# Patient Record
Sex: MALE | Race: WHITE
[De-identification: names, ages, dates, MRNs, and addresses within clinical notes are randomized per-mention and may not be internally consistent; named-entity substitution may affect disease eponyms.]

---

## 2018-04-20 ENCOUNTER — HOSPITAL ENCOUNTER (INPATIENT)
Dept: HOSPITAL 47 - EC | Age: 78
LOS: 2 days | Discharge: HOME | DRG: 65 | End: 2018-04-22
Payer: MEDICARE

## 2018-04-20 DIAGNOSIS — Z86.73: ICD-10-CM

## 2018-04-20 DIAGNOSIS — I08.3: ICD-10-CM

## 2018-04-20 DIAGNOSIS — I10: ICD-10-CM

## 2018-04-20 DIAGNOSIS — R40.2362: ICD-10-CM

## 2018-04-20 DIAGNOSIS — R40.2252: ICD-10-CM

## 2018-04-20 DIAGNOSIS — I63.9: Primary | ICD-10-CM

## 2018-04-20 DIAGNOSIS — G37.9: ICD-10-CM

## 2018-04-20 DIAGNOSIS — G81.91: ICD-10-CM

## 2018-04-20 DIAGNOSIS — H54.61: ICD-10-CM

## 2018-04-20 DIAGNOSIS — R40.2142: ICD-10-CM

## 2018-04-20 DIAGNOSIS — R29.702: ICD-10-CM

## 2018-04-20 DIAGNOSIS — R47.81: ICD-10-CM

## 2018-04-20 DIAGNOSIS — Z87.891: ICD-10-CM

## 2018-04-20 LAB
ALBUMIN SERPL-MCNC: 3.7 G/DL (ref 3.5–5)
ALP SERPL-CCNC: 89 U/L (ref 38–126)
ALT SERPL-CCNC: 27 U/L (ref 21–72)
ANION GAP SERPL CALC-SCNC: 13 MMOL/L
APTT BLD: 22.4 SEC (ref 22–30)
AST SERPL-CCNC: 25 U/L (ref 17–59)
BASOPHILS # BLD AUTO: 0 K/UL (ref 0–0.2)
BASOPHILS NFR BLD AUTO: 1 %
BUN SERPL-SCNC: 15 MG/DL (ref 9–20)
CALCIUM SPEC-MCNC: 9.2 MG/DL (ref 8.4–10.2)
CHLORIDE SERPL-SCNC: 103 MMOL/L (ref 98–107)
CK SERPL-CCNC: 182 U/L (ref 55–170)
CO2 SERPL-SCNC: 24 MMOL/L (ref 22–30)
EOSINOPHIL # BLD AUTO: 0.1 K/UL (ref 0–0.7)
EOSINOPHIL NFR BLD AUTO: 2 %
ERYTHROCYTE [DISTWIDTH] IN BLOOD BY AUTOMATED COUNT: 4.52 M/UL (ref 4.3–5.9)
ERYTHROCYTE [DISTWIDTH] IN BLOOD: 13 % (ref 11.5–15.5)
GLUCOSE BLD-MCNC: 120 MG/DL (ref 75–99)
GLUCOSE SERPL-MCNC: 123 MG/DL (ref 74–99)
HCT VFR BLD AUTO: 40.6 % (ref 39–53)
HGB BLD-MCNC: 13.8 GM/DL (ref 13–17.5)
INR PPP: 1.1 (ref ?–1.2)
LYMPHOCYTES # SPEC AUTO: 0.9 K/UL (ref 1–4.8)
LYMPHOCYTES NFR SPEC AUTO: 13 %
MCH RBC QN AUTO: 30.6 PG (ref 25–35)
MCHC RBC AUTO-ENTMCNC: 34.1 G/DL (ref 31–37)
MCV RBC AUTO: 89.9 FL (ref 80–100)
MONOCYTES # BLD AUTO: 0.5 K/UL (ref 0–1)
MONOCYTES NFR BLD AUTO: 7 %
NEUTROPHILS # BLD AUTO: 5.3 K/UL (ref 1.3–7.7)
NEUTROPHILS NFR BLD AUTO: 76 %
PLATELET # BLD AUTO: 193 K/UL (ref 150–450)
POTASSIUM SERPL-SCNC: 4 MMOL/L (ref 3.5–5.1)
PROT SERPL-MCNC: 7.1 G/DL (ref 6.3–8.2)
PT BLD: 10.3 SEC (ref 9–12)
SODIUM SERPL-SCNC: 140 MMOL/L (ref 137–145)
TROPONIN I SERPL-MCNC: <0.012 NG/ML (ref 0–0.03)
WBC # BLD AUTO: 6.9 K/UL (ref 3.8–10.6)

## 2018-04-20 PROCEDURE — 93005 ELECTROCARDIOGRAM TRACING: CPT

## 2018-04-20 PROCEDURE — 80061 LIPID PANEL: CPT

## 2018-04-20 PROCEDURE — 83036 HEMOGLOBIN GLYCOSYLATED A1C: CPT

## 2018-04-20 PROCEDURE — 99285 EMERGENCY DEPT VISIT HI MDM: CPT

## 2018-04-20 PROCEDURE — 82553 CREATINE MB FRACTION: CPT

## 2018-04-20 PROCEDURE — 83090 ASSAY OF HOMOCYSTEINE: CPT

## 2018-04-20 PROCEDURE — 36415 COLL VENOUS BLD VENIPUNCTURE: CPT

## 2018-04-20 PROCEDURE — 83735 ASSAY OF MAGNESIUM: CPT

## 2018-04-20 PROCEDURE — 80053 COMPREHEN METABOLIC PANEL: CPT

## 2018-04-20 PROCEDURE — 70450 CT HEAD/BRAIN W/O DYE: CPT

## 2018-04-20 PROCEDURE — 70496 CT ANGIOGRAPHY HEAD: CPT

## 2018-04-20 PROCEDURE — 84484 ASSAY OF TROPONIN QUANT: CPT

## 2018-04-20 PROCEDURE — 82550 ASSAY OF CK (CPK): CPT

## 2018-04-20 PROCEDURE — 85610 PROTHROMBIN TIME: CPT

## 2018-04-20 PROCEDURE — 95816 EEG AWAKE AND DROWSY: CPT

## 2018-04-20 PROCEDURE — 70498 CT ANGIOGRAPHY NECK: CPT

## 2018-04-20 PROCEDURE — 85027 COMPLETE CBC AUTOMATED: CPT

## 2018-04-20 PROCEDURE — 85025 COMPLETE CBC W/AUTO DIFF WBC: CPT

## 2018-04-20 PROCEDURE — 93306 TTE W/DOPPLER COMPLETE: CPT

## 2018-04-20 PROCEDURE — 85730 THROMBOPLASTIN TIME PARTIAL: CPT

## 2018-04-20 PROCEDURE — 71046 X-RAY EXAM CHEST 2 VIEWS: CPT

## 2018-04-20 PROCEDURE — 70553 MRI BRAIN STEM W/O & W/DYE: CPT
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
## 2018-04-20 NOTE — XR
EXAMINATION TYPE: XR chest 2V

 

DATE OF EXAM: 4/20/2018

 

COMPARISON: NONE

 

HISTORY: Altered mental status, right-sided weakness

 

TECHNIQUE:  Frontal and lateral views of the chest are obtained.

 

FINDINGS:  There is  pleural effusion or pneumothorax seen. Question some increased density superimpo
sed over the left heart, patient is rotated.  The cardiac silhouette size is enlarged, appearance of 
size may be accentuated by rotation.  Increased AP diameter of the chest could be indicative of under
lying COPD. Anterior flowing osteophytes within the spine suggest diffuse idiopathic skeletal hyperos
tosis. The aorta is dense. The osseous structures are intact.

 

IMPRESSION:  Cardiomegaly is suspected. Possible left lower lobe pneumonia.

## 2018-04-20 NOTE — CT
EXAMINATION TYPE: CT angio head neck

 

DATE OF EXAM: 4/20/2018

 

COMPARISON: NONE

 

HISTORY: Rt sided weakness

 

CT DLP: 382.8 mGycm

 

CONTRAST: 

Performed with IV Contrast, patient injected with 65 mL of Isovue 370.

 

Combination Contrast CTA cervical carotids and [place] CTA cervical carotids. Poor contrast 
bolus limits evaluation. 3-D imaging not available.

 

Contrast CTA of the cervical carotids was performed 3-D reconstruction imaging obtained at a separate
 workstation.  

 

Right carotid system: Mild plaque is seen of the right common carotid artery.  There is mild to moder
ate plaque also noted at the carotid bulb and proximal ICA.  Estimated diameter reduction is approxim
ately 50%.  ECA is patent.  Right vertebral artery appears unremarkable.  

 

Left carotid system: Mild plaque is seen of the left common carotid artery.  There is mild to moderat
e plaque also noted at the carotid bulb and proximal ICA.  Estimated diameter reduction is approximat
ely 50%.  ECA is patent. Left vertebral artery appears unremarkable. 

 

IMPRESSION: 

 

1. 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
lly. Examination is limited as noted above. 

 

CTA [place] 

 

Contrast CTA of the [place] was performed. 3-D imaging could not be performed given poor con
trast bolus.

 

Vertebrobasilar system as well as intracranial portions of the internal carotid arteries and their ma
[place] tributaries are patent.  I do not see evidence for sizable aneurysm or vascular malformation.  Pl
ease note MRI provides greater sensitivity and specificity.  Visualized brain appears grossly unremar
kable. 

 

IMPRESSION:

 

1. Limited study. No obvious abnormality seen.
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
## 2018-04-20 NOTE — ED
General Adult HPI





- General


Stated complaint: Poss Stroke


Time Seen by Provider: 04/20/18 11:12


Source: RN notes reviewed





- History of Present Illness


Initial comments: 





This is a 77-year-old male who presents emergency Department complaining that 

about a half an hour ago he started having some right-sided weakness and 

slurred speech however at this time he feels as though his speech is much more 

clear and his weakness is almost completely resolved.  Patient denies any 

visual disturbance.  Patient denies any headache she denies any focal numbness.

  Patient denies any recent fever or chills.  Patient denies abdominal pain 

patient denies nausea vomiting diarrhea.  Patient states once many years ago he 

was told he had a stroke.  He has no residual deficit.





- Related Data


 Home Medications











 Medication  Instructions  Recorded  Confirmed


 


No Known Home Medications [No  04/20/18 04/20/18





Known Home Medications]   











 Allergies











Allergy/AdvReac Type Severity Reaction Status Date / Time


 


No Known Allergies Allergy   Verified 04/20/18 11:58














Review of Systems


ROS Statement: 


Those systems with pertinent positive or pertinent negative responses have been 

documented in the HPI.





ROS Other: All systems not noted in ROS Statement are negative.





General Exam





- General Exam Comments


Initial Comments: 





GENERAL:


Patient is well-developed and well-nourished.  Patient is nontoxic and well-

hydrated and is in no acute distress.





ENT:


Neck is soft and supple.  No significant lymphadenopathy is noted.  Oropharynx 

is clear.  Moist mucous membranes.  Neck has full range of motion without 

eliciting any pain.  





EYES:


The sclera were anicteric and conjunctiva were pink and moist.  Extraocular 

movements were intact and pupils were equal round and reactive to light.  

Eyelids were unremarkable.





PULMONARY:


Unlabored respirations.  Good breath sounds bilaterally.  No audible rales 

rhonchi or wheezing was noted.





CARDIOVASCULAR:


There is a regular rate and rhythm without any murmurs gallops or rubs. 





ABDOMEN:


Soft and nontender with normal bowel sounds.  No palpable organomegaly was 

noted.  There is no palpable pulsatile mass.





SKIN:


Skin is clear with no lesions or rashes and otherwise unremarkable.





NEUROLOGIC:


Patient is alert and oriented x3.  Cranial nerves II through XII are grossly 

intact.  Patient's right  was slightly less than the left.  His finger to 

nose testing was intact bilaterally.  Patient had no drift.  Patient's speech 

initially had a slight slur but after I was in the room for 5 minutes she no 

longer had any slurred speech





MUSCULOSKELETAL:


Normal extremities with adequate strength and full range of motion.  No lower 

extremity swelling or edema.  No calf tenderness.





LYMPHATICS:


No significant lymphadenopathy is noted





PSYCHIATRIC:


Normal psychiatric evaluation.  





Course


 Vital Signs











  04/20/18 04/20/18 04/20/18





  11:17 11:30 11:45


 


Temperature 98 F  


 


Pulse Rate 100 93 94


 


Respiratory 16 16 16





Rate   


 


Blood Pressure 194/123 182/108 184/110


 


O2 Sat by Pulse 96 96 93 L





Oximetry   














  04/20/18 04/20/18 04/20/18





  12:00 12:15 12:30


 


Temperature   


 


Pulse Rate 90 87 111 H


 


Respiratory 16 16 16





Rate   


 


Blood Pressure 171/101 125/87 186/116


 


O2 Sat by Pulse 92 L 95 96





Oximetry   














  04/20/18 04/20/18





  13:00 14:00


 


Temperature  


 


Pulse Rate 85 74


 


Respiratory 16 16





Rate  


 


Blood Pressure 172/114 176/114


 


O2 Sat by Pulse 97 97





Oximetry  














Medical Decision Making





- Medical Decision Making





EKG shows sinus rhythm with occasional PAC at 90 bpm OH interval is 192 QRS is 

90 QT interval 360 QTC is 459.  Patient's EKG shows no ST segment elevation or 

depression or T wave abnormalities are noted.





CT of the brain shows no acute abnormality.  The neuro interventional is was 

involved in the case and did not want TPA.  Patient's symptoms completely 

resolved.





I spoke with Dr. Dr. Hector he agreed to admit the patient admitted the patient 

I wrote admitting orders.





- Lab Data


Result diagrams: 


 04/20/18 11:46





 04/20/18 11:46


 Lab Results











  04/20/18 04/20/18 04/20/18 Range/Units





  11:46 11:46 11:46 


 


WBC   6.9   (3.8-10.6)  k/uL


 


RBC   4.52   (4.30-5.90)  m/uL


 


Hgb   13.8   (13.0-17.5)  gm/dL


 


Hct   40.6   (39.0-53.0)  %


 


MCV   89.9   (80.0-100.0)  fL


 


MCH   30.6   (25.0-35.0)  pg


 


MCHC   34.1   (31.0-37.0)  g/dL


 


RDW   13.0   (11.5-15.5)  %


 


Plt Count   193   (150-450)  k/uL


 


Neutrophils %   76   %


 


Lymphocytes %   13   %


 


Monocytes %   7   %


 


Eosinophils %   2   %


 


Basophils %   1   %


 


Neutrophils #   5.3   (1.3-7.7)  k/uL


 


Lymphocytes #   0.9 L   (1.0-4.8)  k/uL


 


Monocytes #   0.5   (0-1.0)  k/uL


 


Eosinophils #   0.1   (0-0.7)  k/uL


 


Basophils #   0.0   (0-0.2)  k/uL


 


PT     (9.0-12.0)  sec


 


INR     (<1.2)  


 


APTT     (22.0-30.0)  sec


 


Sodium    140  (137-145)  mmol/L


 


Potassium    4.0  (3.5-5.1)  mmol/L


 


Chloride    103  ()  mmol/L


 


Carbon Dioxide    24  (22-30)  mmol/L


 


Anion Gap    13  mmol/L


 


BUN    15  (9-20)  mg/dL


 


Creatinine    0.83  (0.66-1.25)  mg/dL


 


Est GFR (CKD-EPI)AfAm    >90  (>60 ml/min/1.73 sqM)  


 


Est GFR (CKD-EPI)NonAf    85  (>60 ml/min/1.73 sqM)  


 


Glucose    123 H  (74-99)  mg/dL


 


POC Glucose (mg/dL)     (75-99)  mg/dL


 


POC Glu Operater ID     


 


Calcium    9.2  (8.4-10.2)  mg/dL


 


Total Bilirubin    0.5  (0.2-1.3)  mg/dL


 


AST    25  (17-59)  U/L


 


ALT    27  (21-72)  U/L


 


Alkaline Phosphatase    89  ()  U/L


 


Total Creatine Kinase  182 H    ()  U/L


 


CK-MB (CK-2)  2.0    (0.0-2.4)  ng/mL


 


CK-MB (CK-2) Rel Index  1.1    


 


Troponin I  <0.012    (0.000-0.034)  ng/mL


 


Total Protein    7.1  (6.3-8.2)  g/dL


 


Albumin    3.7  (3.5-5.0)  g/dL














  04/20/18 04/20/18 Range/Units





  11:46 11:48 


 


WBC    (3.8-10.6)  k/uL


 


RBC    (4.30-5.90)  m/uL


 


Hgb    (13.0-17.5)  gm/dL


 


Hct    (39.0-53.0)  %


 


MCV    (80.0-100.0)  fL


 


MCH    (25.0-35.0)  pg


 


MCHC    (31.0-37.0)  g/dL


 


RDW    (11.5-15.5)  %


 


Plt Count    (150-450)  k/uL


 


Neutrophils %    %


 


Lymphocytes %    %


 


Monocytes %    %


 


Eosinophils %    %


 


Basophils %    %


 


Neutrophils #    (1.3-7.7)  k/uL


 


Lymphocytes #    (1.0-4.8)  k/uL


 


Monocytes #    (0-1.0)  k/uL


 


Eosinophils #    (0-0.7)  k/uL


 


Basophils #    (0-0.2)  k/uL


 


PT  10.3   (9.0-12.0)  sec


 


INR  1.1   (<1.2)  


 


APTT  22.4   (22.0-30.0)  sec


 


Sodium    (137-145)  mmol/L


 


Potassium    (3.5-5.1)  mmol/L


 


Chloride    ()  mmol/L


 


Carbon Dioxide    (22-30)  mmol/L


 


Anion Gap    mmol/L


 


BUN    (9-20)  mg/dL


 


Creatinine    (0.66-1.25)  mg/dL


 


Est GFR (CKD-EPI)AfAm    (>60 ml/min/1.73 sqM)  


 


Est GFR (CKD-EPI)NonAf    (>60 ml/min/1.73 sqM)  


 


Glucose    (74-99)  mg/dL


 


POC Glucose (mg/dL)   120 H  (75-99)  mg/dL


 


POC Glu Operater ID   Noreen Sousa  


 


Calcium    (8.4-10.2)  mg/dL


 


Total Bilirubin    (0.2-1.3)  mg/dL


 


AST    (17-59)  U/L


 


ALT    (21-72)  U/L


 


Alkaline Phosphatase    ()  U/L


 


Total Creatine Kinase    ()  U/L


 


CK-MB (CK-2)    (0.0-2.4)  ng/mL


 


CK-MB (CK-2) Rel Index    


 


Troponin I    (0.000-0.034)  ng/mL


 


Total Protein    (6.3-8.2)  g/dL


 


Albumin    (3.5-5.0)  g/dL














Disposition


Clinical Impression: 


 Transient cerebral ischemia





Disposition: ADMITTED AS IP TO THIS HOSP


Is patient prescribed a controlled substance at d/c from ED?: No


Time of Disposition: 14:00

## 2018-04-20 NOTE — CT
EXAMINATION TYPE: CT brain wo con for TPA

 

DATE OF EXAM: 4/20/2018

 

COMPARISON: NONE

 

HISTORY: Rt sided weakness

 

CT DLP: 961.8 mGycm

 

Unenhanced CT of the brain was performed.  

 

The ventricles, basal cisterns and sulci overlying the cerebral convexities demonstrate mild enlargem
ent. 

 

There is no evidence for intracranial hemorrhage or sulcal effacement.  

 

There is moderate confluent decreased attenuation about the periventricular white matter and deep whi
te matter of both cerebral hemispheres, compatible with chronic small vessel ischemia. Differential d
iagnosis does include demyelination. 

 

No mass effects are seen.No midline shift.

 

Osseous calvarium is intact.  Scleral buckle procedure right lobe.

 

If symptoms persist consider MRI.  

 

IMPRESSION:

 

1. Age related atrophic and chronic small vessel ischemic change without acute intracranial process s
een at this time.

## 2018-04-21 LAB
ALBUMIN SERPL-MCNC: 3.6 G/DL (ref 3.5–5)
ALP SERPL-CCNC: 76 U/L (ref 38–126)
ALT SERPL-CCNC: 22 U/L (ref 21–72)
ANION GAP SERPL CALC-SCNC: 11 MMOL/L
AST SERPL-CCNC: 28 U/L (ref 17–59)
BUN SERPL-SCNC: 14 MG/DL (ref 9–20)
CALCIUM SPEC-MCNC: 8.9 MG/DL (ref 8.4–10.2)
CHLORIDE SERPL-SCNC: 104 MMOL/L (ref 98–107)
CHOLEST SERPL-MCNC: 154 MG/DL (ref ?–200)
CO2 SERPL-SCNC: 28 MMOL/L (ref 22–30)
ERYTHROCYTE [DISTWIDTH] IN BLOOD BY AUTOMATED COUNT: 4.6 M/UL (ref 4.3–5.9)
ERYTHROCYTE [DISTWIDTH] IN BLOOD: 13.1 % (ref 11.5–15.5)
GLUCOSE SERPL-MCNC: 78 MG/DL (ref 74–99)
HBA1C MFR BLD: 5.2 % (ref 4–6)
HCT VFR BLD AUTO: 41.7 % (ref 39–53)
HDLC SERPL-MCNC: 30 MG/DL (ref 40–60)
HGB BLD-MCNC: 13.8 GM/DL (ref 13–17.5)
LDLC SERPL CALC-MCNC: 100 MG/DL (ref 0–99)
MAGNESIUM SPEC-SCNC: 2 MG/DL (ref 1.6–2.3)
MCH RBC QN AUTO: 29.9 PG (ref 25–35)
MCHC RBC AUTO-ENTMCNC: 33 G/DL (ref 31–37)
MCV RBC AUTO: 90.6 FL (ref 80–100)
PLATELET # BLD AUTO: 199 K/UL (ref 150–450)
POTASSIUM SERPL-SCNC: 4 MMOL/L (ref 3.5–5.1)
PROT SERPL-MCNC: 7 G/DL (ref 6.3–8.2)
SODIUM SERPL-SCNC: 143 MMOL/L (ref 137–145)
TRIGL SERPL-MCNC: 120 MG/DL (ref ?–150)
WBC # BLD AUTO: 9.4 K/UL (ref 3.8–10.6)

## 2018-04-21 RX ADMIN — ASPIRIN 81 MG CHEWABLE TABLET SCH MG: 81 TABLET CHEWABLE at 08:25

## 2018-04-21 NOTE — ECHOF
Referral Reason:stroke



MEASUREMENTS

--------

HEIGHT: 157.5 cm

WEIGHT: 114.3 kg

BP: 120/50

IVSd:   1.4 cm     (0.6 - 1.1)

LVIDd:   4.6 cm     (3.9 - 5.3)

LVPWd:   1.3 cm     (0.6 - 1.1)

IVSs:   2.3 cm

LVIDs:   2.9 cm

LVPWs:   1.6 cm

LAESV Index (A-L):   44.82 ml/m

Ao Diam:   3.9 cm     (2.0 - 3.7)

AV Cusp:   1.9 cm     (1.5 - 2.6)

LA Diam:   5.4 cm     (2.7 - 3.8)

MV EXCURSION:   15.662 mm     (> 18.000)

MV EF SLOPE:   64 mm/s     (70 - 150)

EPSS:   1.9 cm

MV E Vinnie:   0.50 m/s

MV DecT:   333 ms

MV A Vinnie:   0.79 m/s

MV E/A Ratio:   0.63 

AR PHT:   629 ms

RAP:   5.00 mmHg

RVSP:   34.14 mmHg







FINDINGS

--------

Sinus rhythm.

This was a technically adequate study.

The left ventricular size is normal.   There is moderate concentric left ventricular hypertrophy.   O
verall left ventricular systolic function is normal with, an EF between 55 - 60 %.

The right ventricle is normal in size.

LA is severely dilated >40 ml/m2

The right atrial size is normal.

There is mild aortic valve sclerosis.   There is mild aortic regurgitation.

Mild mitral annular calcification present.   Mild mitral regurgitation is present.

Mild tricuspid regurgitation present.   There is no evidence of pulmonary hypertension.   The right v
entricular systolic pressure, as measured by Doppler, is 34.14mmHg.

Trace/mild (physiologic)  pulmonic regurgitation.

The aortic root size is normal.

There is no pericardial effusion.



CONCLUSIONS

--------

1. Sinus rhythm.

2. The left ventricular size is normal.

3. There is moderate concentric left ventricular hypertrophy.

4. Overall left ventricular systolic function is normal with, an EF between 55 - 60 %.

5. LA is severely dilated >40 ml/m2

6. There is mild aortic valve sclerosis.

7. There is mild aortic regurgitation.

8. Mild mitral annular calcification present.

9. Mild mitral regurgitation is present.

10. Mild tricuspid regurgitation present.

11. There is no evidence of pulmonary hypertension.

12. Trace/mild (physiologic)  pulmonic regurgitation.

13. The aortic root size is normal.

14. There is no pericardial effusion.





SONOGRAPHER: Missy Edmonds RDCS

## 2018-04-21 NOTE — P.PN
Subjective


Progress Note Date: 04/21/18


Principal diagnosis: 


CVA








Neurology is following on a 77-year-old male who presented to the ED with right-

sided weakness, slurred speech.  Speech resolved with the exception of the 

patient's right perioral droop.  Patient complained of right upper and lower 

extremity weakness which originally resolved prior to neurology consult 

yesterday.  However, symptoms returned and were noted on physical exam during 

neurology consult.  Patient states that the symptoms are currently unchanged 

with the exception that his speech has returned to baseline.





MR brain noted to tiny areas of recent ischemic changes in the kwame.  White 

matter lesions within the corpus callosum and ventricles are suspicious for 

multiple sclerosis.  Other causes of demyelination such as ischemia are not 

entirely excluded.  Patient had previous CT angiogram head and neck which noted 

no significant focal stenosis in common or internal carotid arteries 

bilaterally.  No aneurysmal change or significant focal stenosis at the level 

of [place].  Given the patient's MRI findings, CT angiogram was 

repeated for verification of any new changes.  No new changes were noted in 

comparative study.





Patient is ambulating in the room without assistance or difficulty.  Patient is 

alert and oriented 3 and in no acute distress.








Objective





- Vital Signs


Vital signs: 


 Vital Signs











Temp  96.7 F L  04/21/18 15:48


 


Pulse  65   04/21/18 15:48


 


Resp  18   04/21/18 15:48


 


BP  176/94   04/21/18 15:48


 


Pulse Ox  97   04/21/18 15:48








 Intake & Output











 04/21/18 04/21/18 04/22/18





 06:59 18:59 06:59


 


Intake Total  120 


 


Output Total 1400 300 


 


Balance -1400 -180 


 


Weight 114.7 kg  


 


Intake:   


 


  Oral  120 


 


Output:   


 


  Urine 1400 300 


 


Other:   


 


  Voiding Method Urinal Urinal 


 


  # Voids  1 1














- Exam





General appearance: Alert & oriented x3, no apparent distress.


Head: Atraumatic, normocephalic, normal inspection


Eyes: Well appearance, PERRLA, EOMI.  .


Ear, nose and throat: Normal exam, mucous membranes moist


Neck: Normal inspection, absent tenderness, lymphadenopathy.


Respiratory: No increased work of breathing


Cardiovascular: Regular rate, rhythm


GI/abdominal: nontender, nondistended


Extremities: full range of motion, normal capillary refill, no tenderness, 

pedal edema joint swelling, calf tenderness.





Neurological: 


cranial nerves II through XII intact, noted upper lip perioral droop - right 

side


right-sided lateralizing weakness


no seizure activity noted on physical exam


no pronator drift and no nystagmus.


Left lower extremity: 4+/5


Right lower extremity: 4 minus/5


Left upper extremity: 4+/5


Right upper extremity: 4 minus/5


Sensation: decreased but intact in the right upper and lower extremity, normal 

in left upper and lower extremity





Psychological: Mood and affect appropriate for setting.





- Labs


CBC & Chem 7: 


 04/21/18 06:50





 04/21/18 06:23


Labs: 


 Abnormal Lab Results - Last 24 Hours (Table)











  04/21/18 Range/Units





  06:23 


 


LDL Cholesterol, Calc  100 H  (0-99)  mg/dL


 


HDL Cholesterol  30 L  (40-60)  mg/dL














Assessment and Plan


(1) CVA (cerebral vascular accident)


Current Visit: Yes   Status: Acute   Code(s): I63.9 - CEREBRAL INFARCTION, 

UNSPECIFIED   SNOMED Code(s): 883515959


   





(2) Right sided weakness


Current Visit: Yes   Status: Acute   Code(s): R53.1 - WEAKNESS   SNOMED Code(s)

: 917795462


   





(3) Mouth droop


Current Visit: Yes   Status: Acute   Code(s): R29.810 - FACIAL WEAKNESS   

SNOMED Code(s): 816352155


   


Plan: 





1.  CVA


2.  Right-sided weakness


3.  right perioral droop





Patient does appear to have suffered CVA with 2 noted areas of ischemic changes 

in the kwame.  Patient's symptoms involving speech have resolved with the patient

's upper and lower extremity weakness on the right are still noted on physical 

exam.  Patient also still has visible right perioral droop.  Patient does not 

have fasciculations.  On physical exam, patient is absent Babinski on the left 

and mildly positive on the right.  MRI of the brain did note to new areas of 

changes to the kwame with other conservative possible demyelinating etiology as 

well.  EEG was normal.  Serum homocystine is still pending. Fasting lipid panel 

have also been ordered.  





patient will continue 81 mg aspirin daily


Prescribed: Lipitor 40 mg by mouth daily at bedtime.  Allow the patient's lipid 

panel is only mildly elevated with an LDL.  Patient does have one previous 

stroke that was found to [place] on imaging several years ago.  Patient's 

current event is a second event and requires maximum statin therapy regardless 

of lipid panel results.





continue physical therapy/occupational therapy as recommended for right upper 

and lower extremity weakness





Discussed medication compliance and adherence with the patient given the fact 

that the patient is now multivitamin history with increasing deficits with each 

occurrence.





status: Neurology will clear the patient for discharge from a neurological 

standpoint.  Patient to follow up in our office within 10-14 days.





I have discussed the plan of care with the physician prior to implementation 

and he agrees with the plan as implemented.

## 2018-04-21 NOTE — CT
EXAMINATION TYPE: CT angio head neck

 

DATE OF EXAM: 4/21/2018

 

HISTORY: Hx of stroke. Admitted for neuro deficits or acute onset right-sided weakness one day jennifer
rHank

 

COMPARISON: CTA from one day earlier.

 

CT DLP: 539.5 mGycm.  Automated Exposure Control for Dose Reduction was Utilized.

 

TECHNIQUE:  CTA scan of the neck is performed with IV Contrast, patient injected with 65 mL of Isovue
 370, axial images are obtained, coronal and sagittal reformatted images are reviewed. Three-D recons
tructed images are created on an independent workstation and reviewed.

 

FINDINGS:

 

Carotid/Vascular Structures: There is mild peripheral plaque in aortic arch. There is normal three-ve
ssel origin from aortic arch. There is mild peripheral calcified plaque at origin of right common car
otid artery. Remainder right common carotid artery shows slight tortuous course without significant p
laque or stenosis. There is more moderate peripheral calcified plaque at carotid bulb with mild to mo
derate mixed plaque extending into internal carotid artery. There is marked tortuous course to the mi
d segment of right internal carotid artery. No significant stenosis is present. There is mild to mode
rate calcified plaque supraclinoid segment. Right external carotid artery shows no significant plaque
 or stenosis. 

 

Bilateral subclavian arteries show no significant plaque or stenosis. The left common carotid artery 
shows no significant plaque or stenosis. There is moderate calcified plaque at left carotid bulb with
 mild to moderate mixed plaque extending into left internal carotid artery. No significant stenosis i
s present. There is marked tortuous course to the mid segment of the left internal carotid artery. Th
ere is mild calcified plaque supraclinoid segment without significant stenosis. There is patent left 
external carotid artery without significant plaque or stenosis. 

 

There is dominant left vertebral artery. Vertebral arteries are patent to basilar junction. There is 
patent left posterior communicating artery. There is no significant stenosis or aneurysmal change in 
the posterior circulation. There is hypoplastic right posterior communicating artery noted.

 

Images of the anterior circulation show patent anterior communicating artery. There is no significant
 plaque or stenosis. There is slightly more inferior and anterior course to the right middle cerebral
 artery noted than typically seen.

 

Other: Visualized portion of brain parenchyma shows age-related atrophy and chronic small vessel isch
emic change. There is scleral buckle right globe with lens metallic density presumed postsurgical (ve
rsus foreign body).

 

There is prominent multilevel spurring in the cervical thoracic spine. Correlate for DISH. There is e
xaggerated cervical curvature. There is multilevel uncovertebral facet arthropathy.

 

Visualized lung apices show moderate emphysematous change with scattered subpleural scarring bilatera
lly noted.

 

Mild to moderate mucosal thickening bilateral maxillary sinuses that is redemonstrated.

 

IMPRESSION:

1. No significant focal stenosis in common or internal carotid arteries bilaterally.

2. No aneurysmal change or significant focal stenosis at level of [place].

## 2018-04-21 NOTE — P.PN
Subjective


Progress Note Date: 04/21/18





Nilda More is a 77-year-old male who presented to [facility] 

[facility] complaining that about a half an hour prior to 

presentation he started having right-sided weakness that involved mostly his 

right upper extremity and slurred speech however at the time he was evaluated 

in ER he feels as though his speech is much more clear and his weakness is 

almost completely resolved.  Patient denies any visual disturbance.  Patient 

denies any headache she denies any focal numbness.  Patient denies any recent 

fever or chills.  Patient denies abdominal pain patient denies nausea vomiting 

diarrhea.  He denies any urinary symptoms.


Patient states once many years ago he was told he had a stroke.  He has no 

residual deficit.


Patient states that his family physician is Dr. Villa Kinney, he states that he 

saw her once for a complete physical, otherwise he has not seen any physicians 

in many years. He does not take any medications. He states that his wife is a 

[employer] , and they have avoided any medical testing or follow-up in 

the past


 


On 04/21/2018 patient is alert and oriented 3 in no apparent distress vital 

examination are stable, still having some right sided weakness, speech is 

affluent at this time.  There is no chest pain or shortness of breath no cough 

no nausea or vomiting no abdominal pain no diarrhea and no urinary symptoms.





Objective





- Vital Signs


Vital signs: 


 Vital Signs











Temp  96.6 F L  04/21/18 08:33


 


Pulse  59 L  04/21/18 15:08


 


Resp  18   04/21/18 15:08


 


BP  147/84   04/21/18 08:33


 


Pulse Ox  95   04/21/18 08:33








 Intake & Output











 04/20/18 04/21/18 04/21/18





 18:59 06:59 18:59


 


Output Total  1400 300


 


Balance  -1400 -300


 


Weight 115.666 kg 114.7 kg 


 


Output:   


 


  Urine  1400 300


 


Other:   


 


  Voiding Method Toilet Urinal Urinal





 Urinal  


 


  # Voids   1














- Exam





In general patient is alert and oriented 3 in no apparent distress


HEENT head normocephalic and atraumatic


Neck is supple no JVD no goiter no lymphadenopathy


Chest exam reveals a few scattered crackles no wheezing


Cardiac exam reveals regular heart sounds S1 and S2 no gallops no murmurs


Abdomen is soft nontender no organomegaly with normal bowel sounds


Extremity exam reveals no edema no cyanosis or clubbing


Neurological examination reveals





- Labs


CBC & Chem 7: 


 04/21/18 06:50





 04/21/18 06:23


Labs: 


 Abnormal Lab Results - Last 24 Hours (Table)











  04/21/18 Range/Units





  06:23 


 


LDL Cholesterol, Calc  100 H  (0-99)  mg/dL


 


HDL Cholesterol  30 L  (40-60)  mg/dL














Assessment and Plan


Plan: 





#1 stroke with right sided weakness symptoms have not resolved yet, patient had 

slurred speech at the onset of symptoms which has improved significantly


At this point he was started on aspirin 325 mg daily.  Will check 

echocardiogram and carotid Doppler, will check telemetry, neurology 

consultation was requested in the emergency room.  MRI is now revealing 

evidence of a area of recent ischemic changes in the kwame.  There is also white 

matter lesions which are suggestive of demyelination secondary to ischemia or 

multiple sclerosis.  At this time will continue with current medication regimen 

awaiting further input from neurology





#2 Will check lipid profile with fasting a.m. labs





#3 glucose level was slightly elevated at 123 Will check hemoglobin A1c with 

a.m. labs





Will follow during this admission for medical management please see orders

## 2018-04-21 NOTE — MR
EXAMINATION TYPE: MR brain wo/w con

 

DATE OF EXAM: 4/21/2018 12:11 PM

 

COMPARISON: NONE

 

HISTORY: Unsteady gait.

 

TECHNIQUE:  Multiplanar, multiecho imaging of the brain was obtained with and without intravenous adm
inistration of 11.5 mL intravenous Gadavist.  

 

FINDINGS: There are lesions in the corpus callosum. Midline structures are otherwise unremarkable. Th
ere is a normal craniocervical junction. There are generalized atrophic changes throughout the brain.


 

There are 2 small areas of restricted diffusion within the sean. Diffusion imaging is otherwise abimbola
l.

 

There are normal vascular flow voids.

 

The orbits are unremarkable.

 

There is no evidence of a CP angle mass lesion.

 

There is both confluent and punctate periventricular white matter lesions. Some of orthogonal to the 
ventricles. There is no mass effect, midline shift or intracranial blood.

 

Following intravenous administration of gadolinium, I do not see evidence of abnormal enhancement.

 

IMPRESSION: 

1. 2 TINY AREAS OF RECENT ISCHEMIC CHANGE IN THE SEAN.

2. WHITE MATTER LESIONS WITHIN THE CORPUS CALLOSUM AND ALSO ORTHOGONAL TO THE VENTRICLES ARE SUSPICIO
US FOR MULTIPLE SCLEROSIS. OTHER CAUSES OF DEMYELINATION SUCH AS ISCHEMIA ARE NOT ENTIRELY EXCLUDED.

## 2018-04-22 VITALS — HEART RATE: 68 BPM | DIASTOLIC BLOOD PRESSURE: 77 MMHG | SYSTOLIC BLOOD PRESSURE: 141 MMHG

## 2018-04-22 VITALS — RESPIRATION RATE: 18 BRPM

## 2018-04-22 VITALS — TEMPERATURE: 97.4 F

## 2018-04-22 LAB
ALBUMIN SERPL-MCNC: 4.1 G/DL (ref 3.5–5)
ALP SERPL-CCNC: 93 U/L (ref 38–126)
ALT SERPL-CCNC: 24 U/L (ref 21–72)
ANION GAP SERPL CALC-SCNC: 15 MMOL/L
AST SERPL-CCNC: 27 U/L (ref 17–59)
BUN SERPL-SCNC: 15 MG/DL (ref 9–20)
CALCIUM SPEC-MCNC: 9.6 MG/DL (ref 8.4–10.2)
CHLORIDE SERPL-SCNC: 104 MMOL/L (ref 98–107)
CO2 SERPL-SCNC: 25 MMOL/L (ref 22–30)
ERYTHROCYTE [DISTWIDTH] IN BLOOD BY AUTOMATED COUNT: 4.75 M/UL (ref 4.3–5.9)
ERYTHROCYTE [DISTWIDTH] IN BLOOD: 13.3 % (ref 11.5–15.5)
GLUCOSE SERPL-MCNC: 143 MG/DL (ref 74–99)
HCT VFR BLD AUTO: 43.2 % (ref 39–53)
HGB BLD-MCNC: 14.4 GM/DL (ref 13–17.5)
MAGNESIUM SPEC-SCNC: 2 MG/DL (ref 1.6–2.3)
MCH RBC QN AUTO: 30.3 PG (ref 25–35)
MCHC RBC AUTO-ENTMCNC: 33.3 G/DL (ref 31–37)
MCV RBC AUTO: 91 FL (ref 80–100)
PLATELET # BLD AUTO: 212 K/UL (ref 150–450)
POTASSIUM SERPL-SCNC: 4.1 MMOL/L (ref 3.5–5.1)
PROT SERPL-MCNC: 7.8 G/DL (ref 6.3–8.2)
SODIUM SERPL-SCNC: 144 MMOL/L (ref 137–145)
WBC # BLD AUTO: 8.9 K/UL (ref 3.8–10.6)

## 2018-04-22 RX ADMIN — ASPIRIN 81 MG CHEWABLE TABLET SCH MG: 81 TABLET CHEWABLE at 09:14

## 2018-04-22 NOTE — P.DS
Providers


Date of admission: 


04/20/18 14:01





Expected date of discharge: 04/22/18


Attending physician: 


Ezekiel Hector





Consults: 





 





04/20/18 14:03


Consult Physician Routine 


   Consulting Provider: Arturo Moran


   Consult Reason/Comments: TIA


   Do you want consulting provider notified?: Yes











Primary care physician: 


Imelda Kinney





[facility] Course: 








Diagnoses on discharge:








#1 stroke with right sided weakness symptoms have not resolved yet, patient had 

slurred speech at the onset of symptoms which has improved significantly


At this point he was started on aspirin 325 mg daily.  Will check 

echocardiogram and carotid Doppler, will check telemetry, neurology 

consultation was requested in the emergency room.  MRI is now revealing 

evidence of a area of recent ischemic changes in the kwame.  There is also white 

matter lesions which are suggestive of demyelination secondary to ischemia or 

multiple sclerosis.  At this time will continue with current medication regimen 

awaiting further input from neurology





#2 Will check lipid profile with fasting a.m. labs





#3 glucose level was slightly elevated at 123 Will check hemoglobin A1c with 

a.m. labs














Hospital course:








Nilda More is a 77-year-old male who presented to [facility] 

Emergency [facility] complaining that about a half an hour prior to 

presentation he started having right-sided weakness that involved mostly his 

right upper extremity and slurred speech however at the time he was evaluated 

in ER he feels as though his speech is much more clear and his weakness is 

almost completely resolved.  Patient denies any visual disturbance.  Patient 

denies any headache she denies any focal numbness.  Patient denies any recent 

fever or chills.  Patient denies abdominal pain patient denies nausea vomiting 

diarrhea.  He denies any urinary symptoms.


Patient states once many years ago he was told he had a stroke.  He has no 

residual deficit.


Patient states that his family physician is Dr. Villa Kinney, he states that he 

saw her once for a complete physical, otherwise he has not seen any physicians 

in many years. He does not take any medications. He states that his wife is a 

[employer] , and they have avoided any medical testing or follow-up in 

the past


 


On 04/21/2018 patient is alert and oriented 3 in no apparent distress vital 

examination are stable, still having some right sided weakness, speech is 

ffluent at this time.  There is no chest pain or shortness of breath no cough 

no nausea or vomiting no abdominal pain no diarrhea and no urinary symptoms.








04/22/2018 patient is alert and oriented 3 in no apparent distress speech is 

fluent and back to normal, right upper extremity weakness has resolved patient 

is feeling that he is back to his baseline he was evaluated by neurology this 

morning and was cleared for discharge he is maintained on Lipitor 40 mg daily 

and Ecotrin 81 mg daily Will continue with this medications follow up with 

primary care physician within one week follow-up with neurology in 1-2 weeks





Plan - Discharge Summary


Discharge Rx Participant: Yes


New Discharge Prescriptions: 


New


   Aspirin 81 mg PO DAILY  chew


   Atorvastatin [Lipitor] 40 mg PO HS  tab


Discharge Medication List





Aspirin 81 mg PO DAILY  chew 04/22/18 [Rx]


Atorvastatin [Lipitor] 40 mg PO HS  tab 04/22/18 [Rx]








Follow up Appointment(s)/Referral(s): 


Arturo Moran MD [STAFF PHYSICIAN] - 1 Week


(Please call offices to make an appointment.


)


Nonstaff,Physician [REFERRING] - 1-2 days


(Please remember to call and arrange for a primary physician


)


Patient Instructions/Handouts:  Ischemic Stroke (DC), Hyperlipidemia (DC)

## 2018-04-23 NOTE — EEG
ELECTROENCEPHALOGRAM REPORT



DATE OF SERVICE:

04/21/2018



REASON FOR TESTING:

Stroke.



DESCRIPTION OF THE PROCEDURE:

This EEG was performed using a 21 channel digital electroencephalograph, following

international 10-20 system.



DESCRIPTION OF THE RECORDING:

From the beginning of the tracing and with patient's eyes closed, the background rhythm

was mostly consisting of 8 Hz alpha frequency in the posterior occipital leads.  No

obvious asymmetry is seen.  Occasional movement and muscle artifacts are seen.  Photic

stimulation was performed with no driving response seen.  No pathological waves were

elicited.  Hyperventilation was not performed.  The patient remains awake throughout

the tracing.  No epileptiform discharges were seen.  His EKG lead showed an irregularly

irregular rhythm with a normal rate.



INTERPRETATION:

This awake EEG can be considered within normal limits except his EKG lead showed an

irregularly irregular rhythm with a normal rate.





[email or web address] / IJN: [id number] / Job#: [id number]

## 2018-04-24 NOTE — CDI
Last Revision, December 2017



Documentation Clarification Form



Date: 4/24/18

From: Bisi William

Phone: [phone] Brittaney Nails,  between 8:30 am & 5 pm DEREK

MRN: [id number]

Admit Date: 4/20/2018 2:01:00 PM

Patient Name: Nilda More

Visit Number: [id number]

Discharge Date: 4/22/18



ATTENTION: The Clinical Documentation Specialists (CDI) and [facility] Coding Staff 
appreciate your assistance in clarifying documentation. Please respond to the 
clarification below the line at the bottom and electronically sign. The CDI & 
[facility] Coding staff will review the response and follow-up if needed. Please note: 
Queries are made part of the Legal Health Record. If you have any questions, 
please contact the author of this message via ITS.



Dr. Ezekiel Hector





Echocardiogram results: mild aortic valve sclerosis and regurgitation, mild 
mitrial regurgitation, mild tricuspid regurgitation.

History/Risk Factors: HTN, stroke



Clinical significance of diagnostic testing and treatment CANNOT be assumed or 
coded without physician documentation of significance if any. Please clarify 
what abnormal laboratory signifies:



Disease process, please specify _______

Abnormal echo Value

Unable to determine

Other, please specify  ____V___



Please continue to document in your progress notes and discharge summary in 
order to capture severity of illness and risk of mortality. Include clinical 
findings that support your diagnosis.

____________________________________________________________________________

Valvular heart disease with mitral regurgitation, Aortic sclerosis and mild 
regurgitation  and mild tricuspid regurgitation

MTDD

## 2018-08-27 ENCOUNTER — HOSPITAL ENCOUNTER (OUTPATIENT)
Dept: HOSPITAL 47 - RADCTMAIN | Age: 78
Discharge: HOME | End: 2018-08-27
Attending: OTOLARYNGOLOGY
Payer: MEDICARE

## 2018-08-27 DIAGNOSIS — Z87.81: ICD-10-CM

## 2018-08-27 DIAGNOSIS — J34.89: ICD-10-CM

## 2018-08-27 DIAGNOSIS — J01.00: Primary | ICD-10-CM

## 2018-08-27 DIAGNOSIS — J32.2: ICD-10-CM

## 2018-08-27 DIAGNOSIS — J32.0: ICD-10-CM

## 2018-08-27 DIAGNOSIS — J01.20: ICD-10-CM

## 2018-08-27 PROCEDURE — 70486 CT MAXILLOFACIAL W/O DYE: CPT

## 2018-08-27 NOTE — CT
EXAMINATION TYPE: CT sinus wo con

 

DATE OF EXAM: 8/27/2018

 

COMPARISON: None

 

HISTORY: 78-year-old male with right-sided nasal pain

 

CT DLP: 676.10 mGycm

Automated exposure control for dose reduction was used.

 

TECHNIQUE: Noncontrast axial views of the paranasal sinuses were obtained. Coronal reconstructions pe
rformed.

 

 

FINDINGS:

 

PARANASAL SINUSES:  

There is moderate mucosal thickening throughout the right greater than left maxillary sinuses. Some a
dditional frothy opacification is present in the right maxillary sinus with asymmetric reactive hammad-o
steogenesis of the sinus wall.

 

Scattered mild mucosal thickening throughout the ethmoid air cells. The frontal and sphenoid sinuses 
are well pneumatized.

 

There is no air-fluid level.

 

There is no destruction of the osseous walls of the paranasal sinuses.

 

THE NASAL CAVITY: 

The osteomeatal complexes are patent.

 

Leftward nasal septal deviation. Old nasal bone fractures.

 

Old right-sided medial orbital wall blowout fracture. Chronic appearing deformity with associated marcus
cification and scleral banding of the right globe.

 

Visualized intracranial structures show patchy periventricular white matter hypodensities suggesting 
changes of chronic small vessel ischemic disease.

 

The visualized mastoid air cells and middle ear cavities are well pneumatized.

 

Reformatted images confirm above findings.

 

 

IMPRESSION:  

 

1. 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 Reactive hammad osteogenesis of the ri
ght maxillary sinus walls indicates long-standing sinus disease.

2. Mild to moderate chronic left maxillary sinus disease and mild within the ethmoid sinuses.

3. Old nasal bone fractures, old right medial orbital wall blowout fracture, chronic appearing right 
globe deformity, and rightward nasal septal deviation.

## 2018-09-11 ENCOUNTER — HOSPITAL ENCOUNTER (INPATIENT)
Dept: HOSPITAL 47 - EC | Age: 78
LOS: 11 days | Discharge: HOME | DRG: 378 | End: 2018-09-22
Payer: MEDICARE

## 2018-09-11 VITALS — BODY MASS INDEX: 35.2 KG/M2

## 2018-09-11 DIAGNOSIS — K29.60: ICD-10-CM

## 2018-09-11 DIAGNOSIS — Z90.49: ICD-10-CM

## 2018-09-11 DIAGNOSIS — Z79.51: ICD-10-CM

## 2018-09-11 DIAGNOSIS — K64.8: ICD-10-CM

## 2018-09-11 DIAGNOSIS — I51.7: ICD-10-CM

## 2018-09-11 DIAGNOSIS — Z86.73: ICD-10-CM

## 2018-09-11 DIAGNOSIS — D62: ICD-10-CM

## 2018-09-11 DIAGNOSIS — I11.9: ICD-10-CM

## 2018-09-11 DIAGNOSIS — D12.3: ICD-10-CM

## 2018-09-11 DIAGNOSIS — K57.33: Primary | ICD-10-CM

## 2018-09-11 DIAGNOSIS — Z79.899: ICD-10-CM

## 2018-09-11 DIAGNOSIS — Z79.82: ICD-10-CM

## 2018-09-11 DIAGNOSIS — K31.7: ICD-10-CM

## 2018-09-11 DIAGNOSIS — K29.80: ICD-10-CM

## 2018-09-11 DIAGNOSIS — Z87.891: ICD-10-CM

## 2018-09-11 DIAGNOSIS — N17.9: ICD-10-CM

## 2018-09-11 DIAGNOSIS — Z82.49: ICD-10-CM

## 2018-09-11 DIAGNOSIS — Z98.42: ICD-10-CM

## 2018-09-11 LAB
ALBUMIN SERPL-MCNC: 3.3 G/DL (ref 3.5–5)
ALP SERPL-CCNC: 188 U/L (ref 38–126)
ALT SERPL-CCNC: 58 U/L (ref 21–72)
ANION GAP SERPL CALC-SCNC: 10 MMOL/L
APTT BLD: 22.7 SEC (ref 22–30)
AST SERPL-CCNC: 40 U/L (ref 17–59)
BASOPHILS # BLD AUTO: 0.1 K/UL (ref 0–0.2)
BASOPHILS # BLD AUTO: 0.1 K/UL (ref 0–0.2)
BASOPHILS NFR BLD AUTO: 0 %
BASOPHILS NFR BLD AUTO: 0 %
BUN SERPL-SCNC: 19 MG/DL (ref 9–20)
CALCIUM SPEC-MCNC: 9.4 MG/DL (ref 8.4–10.2)
CHLORIDE SERPL-SCNC: 105 MMOL/L (ref 98–107)
CK SERPL-CCNC: 52 U/L (ref 55–170)
CO2 SERPL-SCNC: 25 MMOL/L (ref 22–30)
EOSINOPHIL # BLD AUTO: 0.1 K/UL (ref 0–0.7)
EOSINOPHIL # BLD AUTO: 0.3 K/UL (ref 0–0.7)
EOSINOPHIL NFR BLD AUTO: 1 %
EOSINOPHIL NFR BLD AUTO: 1 %
ERYTHROCYTE [DISTWIDTH] IN BLOOD BY AUTOMATED COUNT: 3.6 M/UL (ref 4.3–5.9)
ERYTHROCYTE [DISTWIDTH] IN BLOOD BY AUTOMATED COUNT: 4.26 M/UL (ref 4.3–5.9)
ERYTHROCYTE [DISTWIDTH] IN BLOOD: 12.9 % (ref 11.5–15.5)
ERYTHROCYTE [DISTWIDTH] IN BLOOD: 13.2 % (ref 11.5–15.5)
GLUCOSE BLD-MCNC: 113 MG/DL (ref 75–99)
GLUCOSE BLD-MCNC: 131 MG/DL (ref 75–99)
GLUCOSE SERPL-MCNC: 107 MG/DL (ref 74–99)
HCT VFR BLD AUTO: 33.7 % (ref 39–53)
HCT VFR BLD AUTO: 39.3 % (ref 39–53)
HGB BLD-MCNC: 11 GM/DL (ref 13–17.5)
HGB BLD-MCNC: 12.8 GM/DL (ref 13–17.5)
INR PPP: 1.2 (ref ?–1.2)
LYMPHOCYTES # SPEC AUTO: 1.2 K/UL (ref 1–4.8)
LYMPHOCYTES # SPEC AUTO: 2 K/UL (ref 1–4.8)
LYMPHOCYTES NFR SPEC AUTO: 10 %
LYMPHOCYTES NFR SPEC AUTO: 8 %
MAGNESIUM SPEC-SCNC: 2.1 MG/DL (ref 1.6–2.3)
MCH RBC QN AUTO: 30.1 PG (ref 25–35)
MCH RBC QN AUTO: 30.5 PG (ref 25–35)
MCHC RBC AUTO-ENTMCNC: 32.6 G/DL (ref 31–37)
MCHC RBC AUTO-ENTMCNC: 32.6 G/DL (ref 31–37)
MCV RBC AUTO: 92.3 FL (ref 80–100)
MCV RBC AUTO: 93.5 FL (ref 80–100)
MONOCYTES # BLD AUTO: 0.8 K/UL (ref 0–1)
MONOCYTES # BLD AUTO: 1.2 K/UL (ref 0–1)
MONOCYTES NFR BLD AUTO: 5 %
MONOCYTES NFR BLD AUTO: 6 %
NEUTROPHILS # BLD AUTO: 13.8 K/UL (ref 1.3–7.7)
NEUTROPHILS # BLD AUTO: 16.3 K/UL (ref 1.3–7.7)
NEUTROPHILS NFR BLD AUTO: 81 %
NEUTROPHILS NFR BLD AUTO: 85 %
PH UR: 5.5 [PH] (ref 5–8)
PLATELET # BLD AUTO: 247 K/UL (ref 150–450)
PLATELET # BLD AUTO: 389 K/UL (ref 150–450)
POTASSIUM SERPL-SCNC: 4.1 MMOL/L (ref 3.5–5.1)
PROT SERPL-MCNC: 7.4 G/DL (ref 6.3–8.2)
PROT UR QL: (no result)
PT BLD: 11.3 SEC (ref 9–12)
RBC UR QL: 14 /HPF (ref 0–5)
SODIUM SERPL-SCNC: 140 MMOL/L (ref 137–145)
SP GR UR: >1.05 (ref 1–1.03)
TROPONIN I SERPL-MCNC: <0.012 NG/ML (ref 0–0.03)
UROBILINOGEN UR QL STRIP: <2 MG/DL (ref ?–2)
WBC # BLD AUTO: 16.2 K/UL (ref 3.8–10.6)
WBC # BLD AUTO: 20.1 K/UL (ref 3.8–10.6)
WBC #/AREA URNS HPF: 2 /HPF (ref 0–5)

## 2018-09-11 PROCEDURE — 45385 COLONOSCOPY W/LESION REMOVAL: CPT

## 2018-09-11 PROCEDURE — 81001 URINALYSIS AUTO W/SCOPE: CPT

## 2018-09-11 PROCEDURE — 99291 CRITICAL CARE FIRST HOUR: CPT

## 2018-09-11 PROCEDURE — 82378 CARCINOEMBRYONIC ANTIGEN: CPT

## 2018-09-11 PROCEDURE — 86920 COMPATIBILITY TEST SPIN: CPT

## 2018-09-11 PROCEDURE — 36415 COLL VENOUS BLD VENIPUNCTURE: CPT

## 2018-09-11 PROCEDURE — 85610 PROTHROMBIN TIME: CPT

## 2018-09-11 PROCEDURE — 91110 GI TRC IMG INTRAL ESOPH-ILE: CPT

## 2018-09-11 PROCEDURE — 96375 TX/PRO/DX INJ NEW DRUG ADDON: CPT

## 2018-09-11 PROCEDURE — 71045 X-RAY EXAM CHEST 1 VIEW: CPT

## 2018-09-11 PROCEDURE — 85730 THROMBOPLASTIN TIME PARTIAL: CPT

## 2018-09-11 PROCEDURE — 87086 URINE CULTURE/COLONY COUNT: CPT

## 2018-09-11 PROCEDURE — 87040 BLOOD CULTURE FOR BACTERIA: CPT

## 2018-09-11 PROCEDURE — 80202 ASSAY OF VANCOMYCIN: CPT

## 2018-09-11 PROCEDURE — 36569 INSJ PICC 5 YR+ W/O IMAGING: CPT

## 2018-09-11 PROCEDURE — 96374 THER/PROPH/DIAG INJ IV PUSH: CPT

## 2018-09-11 PROCEDURE — 85025 COMPLETE CBC W/AUTO DIFF WBC: CPT

## 2018-09-11 PROCEDURE — 87077 CULTURE AEROBIC IDENTIFY: CPT

## 2018-09-11 PROCEDURE — 86901 BLOOD TYPING SEROLOGIC RH(D): CPT

## 2018-09-11 PROCEDURE — 85027 COMPLETE CBC AUTOMATED: CPT

## 2018-09-11 PROCEDURE — 82553 CREATINE MB FRACTION: CPT

## 2018-09-11 PROCEDURE — 70450 CT HEAD/BRAIN W/O DYE: CPT

## 2018-09-11 PROCEDURE — 70491 CT SOFT TISSUE NECK W/DYE: CPT

## 2018-09-11 PROCEDURE — 43251 EGD REMOVE LESION SNARE: CPT

## 2018-09-11 PROCEDURE — 43239 EGD BIOPSY SINGLE/MULTIPLE: CPT

## 2018-09-11 PROCEDURE — 88305 TISSUE EXAM BY PATHOLOGIST: CPT

## 2018-09-11 PROCEDURE — 51798 US URINE CAPACITY MEASURE: CPT

## 2018-09-11 PROCEDURE — 84484 ASSAY OF TROPONIN QUANT: CPT

## 2018-09-11 PROCEDURE — 87186 SC STD MICRODIL/AGAR DIL: CPT

## 2018-09-11 PROCEDURE — 76937 US GUIDE VASCULAR ACCESS: CPT

## 2018-09-11 PROCEDURE — 83735 ASSAY OF MAGNESIUM: CPT

## 2018-09-11 PROCEDURE — 86900 BLOOD TYPING SEROLOGIC ABO: CPT

## 2018-09-11 PROCEDURE — 84100 ASSAY OF PHOSPHORUS: CPT

## 2018-09-11 PROCEDURE — 30253N1: ICD-10-PCS

## 2018-09-11 PROCEDURE — 80053 COMPREHEN METABOLIC PANEL: CPT

## 2018-09-11 PROCEDURE — 86850 RBC ANTIBODY SCREEN: CPT

## 2018-09-11 PROCEDURE — 74176 CT ABD & PELVIS W/O CONTRAST: CPT

## 2018-09-11 PROCEDURE — 80048 BASIC METABOLIC PNL TOTAL CA: CPT

## 2018-09-11 PROCEDURE — 82550 ASSAY OF CK (CPK): CPT

## 2018-09-11 PROCEDURE — 96361 HYDRATE IV INFUSION ADD-ON: CPT

## 2018-09-11 PROCEDURE — 93005 ELECTROCARDIOGRAM TRACING: CPT

## 2018-09-11 RX ADMIN — ACETAMINOPHEN PRN MG: 325 TABLET, FILM COATED ORAL at 20:22

## 2018-09-11 RX ADMIN — CEFAZOLIN SCH MLS/HR: 330 INJECTION, POWDER, FOR SOLUTION INTRAMUSCULAR; INTRAVENOUS at 17:58

## 2018-09-11 NOTE — CT
EXAMINATION TYPE: CT brain wo con

 

DATE OF EXAM: 9/11/2018

 

HISTORY: Syncopal episode today with dental pain.

 

CT DLP: 1260 mGycm.  Automated Exposure Control for Dose Reduction was Utilized.

 

TECHNIQUE: CT scan of the head is performed without contrast.

 

COMPARISON: CT brain April 10, 2018.

 

FINDINGS:   There is no acute intracranial hemorrhage or midline shift identified. There is diffuse v
entricular and sulcal prominence consistent with diffuse age-related cerebral atrophy.  There is low-
attenuation in the periventricular white matter consistent with chronic small vessel ischemic change.
 Eccentric mucosal thickening bilateral maxillary sinuses is redemonstrated. There is calcified right
 lens  in globe redemonstrated.

 

IMPRESSION:  No acute intracranial hemorrhage or midline shift.  There is moderate diffuse age-relate
d cerebral atrophy and chronic small vessel ischemic change redemonstrated.  No significant change fr
om recent CT.

## 2018-09-11 NOTE — CT
EXAMINATION TYPE: CT soft tissue neck w con

 

DATE OF EXAM: 9/11/2018

 

HISTORY: Syncopal episode today with dental pain.

 

COMPARISON: CTA head and neck April 21, 2018

 

CT DLP: 933 mGycm.  Automated Exposure Control for Dose Reduction was Utilized.

 

TECHNIQUE:  CT scan of the neck is performed with IV Contrast, patient injected with 100 mL of Isovue
 300, axial images are obtained, coronal and sagittal reformatted images are reviewed.

 

FINDINGS:

 

Airway: Mild emphysematous change with peripheral fibrosis in the upper lungs is redemonstrated. Ther
e is 4 to 5 mm nodule right upper lobe axial image 84 noted not definitively seen on prior. Follow-up
 advised.

 

Parotid/submandibular glands:  No gross abnormality seen.

 

Carotid/Vascular Structures: Mild to moderate calcified plaque bilateral carotid bulbs is redemonstra
maria a 

 

Osseous Structures: There is severe multilevel anterior spurring in the cervical spine. There is mult
ilevel uncovertebral facet degenerative changes bilaterally. Underlying scoliosis is partially imaged
.

 

Other: No suspicious new greater than 1 cm neck adenopathy.

 

Some eccentric mucosal thickening in the bilateral maxillary sinuses remains present.

 

Calcified right lens is again seen in right globe.

 

IMPRESSION:  No significant acute abnormality is seen. No significant change from recent CTA neck lily
dy.

## 2018-09-11 NOTE — CT
EXAMINATION TYPE: CT abdomen pelvis wo con

 

DATE OF EXAM: 9/11/2018

 

COMPARISON: None

 

HISTORY: dizziness, nausea, fever

 

CT DLP: 1189.6 mGycm. Automated exposure control for dose reduction was used.

 

TECHNIQUE:  Helical acquisition of images was performed from the lung bases through the pelvis.

 

FINDINGS: The procedures ordered without intravenous contrast. However, the urinary bladder shows con
centrated contrast within its lumen, as do the upper collecting systems.

 

LUNG BASES: In the left lower lobe is a 5 x 5 x 3 cm subpleural geographic zone of consolidative opac
ity with tiny cystic changes noted within common and minimal bronchiectasis noted. No associated calc
ifications, and the overlying ribs are intact.

 

LIVER/GB: No significant abnormality is appreciated. Hepatic cyst noted.

 

PANCREAS: No significant abnormality is seen.

 

SPLEEN: No significant abnormality is seen.

 

ADRENALS: No significant abnormality is seen.

 

KIDNEYS: No significant abnormality is seen. Renal cysts noted. 

 

PERITONEAL CAVITY: No free air is visualized. No peritoneal fluid.

 

ABDOMINAL ADENOPATHY:  None visualized

 

REPRODUCTIVE ORGANS: No significant abnormality is seen

 

URINARY BLADDER:  There is marked thickening and indistinctness of the anterosuperior urinary bladder
 wall. This thickening is markedly ill-defined and extends upward to involve the undersurface of the 
proximal sigmoid colon. This zone of marked focal inflammatory change is situated anteriorly at the m
idline and measures 8 cm transverse x 7 cm AP x 4 cm CC. Markedly heterogeneous in CT attenuation, th
ere are foci of low attenuation within, but no well-formed sizable abscess. Rather, the appearance re
sembles phlegmon. 

 

PELVIC ADENOPATHY:  None visualized.

 

OSSEOUS STRUCTURES:  No significant abnormality is seen.

 

BOWEL:  Prominent sigmoid and descending colonic diverticulosis pattern. There is indistinct mural th
ickening to the proximal/mid sigmoid which is contiguous with the aforementioned urinary bladder find
ings.

 

OTHER: Vasculature is negative for acute findings.

 

IMPRESSION: 

1. CT FINDINGS SUGGEST COMPLICATED ACUTE SIGMOID DIVERTICULITIS, WITH 8 X 7 X 5 CM PHLEGMON JUXTAPOSE
D BETWEEN THE SIGMOID AND THE URINARY BLADDER. 

 

2. Incidental:  LLL 5 x 5 x 3 cm pulmonary consolidative opacity. If no prior CTs available to ensure
 stability over time, then 3 month follow up Chest CT is recommended.
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
## 2018-09-11 NOTE — XR
EXAMINATION TYPE: XR chest 1V portable

 

DATE OF EXAM: 9/11/2018

 

COMPARISON: Chest x-ray April 20, 2018

 

HISTORY: Dizziness and syncope, weakness.

 

TECHNIQUE: Single frontal view of the chest is obtained.

 

FINDINGS:  There is chronic parenchymal change without suspicious new focal air space opacity, pleura
l effusion, or pneumothorax seen.  The cardiac silhouette size remains enlarged with slightly ectatic
 thoracic aorta.   The osseous structures are intact.

 

IMPRESSION:  Chronic changes and cardiomegaly without acute pulmonary process.

## 2018-09-12 LAB
ANION GAP SERPL CALC-SCNC: 6 MMOL/L
BASOPHILS # BLD AUTO: 0 K/UL (ref 0–0.2)
BASOPHILS # BLD AUTO: 0.1 K/UL (ref 0–0.2)
BASOPHILS NFR BLD AUTO: 0 %
BASOPHILS NFR BLD AUTO: 0 %
BUN SERPL-SCNC: 20 MG/DL (ref 9–20)
CALCIUM SPEC-MCNC: 8.4 MG/DL (ref 8.4–10.2)
CHLORIDE SERPL-SCNC: 111 MMOL/L (ref 98–107)
CO2 SERPL-SCNC: 22 MMOL/L (ref 22–30)
EOSINOPHIL # BLD AUTO: 0.2 K/UL (ref 0–0.7)
EOSINOPHIL # BLD AUTO: 0.2 K/UL (ref 0–0.7)
EOSINOPHIL NFR BLD AUTO: 1 %
EOSINOPHIL NFR BLD AUTO: 1 %
ERYTHROCYTE [DISTWIDTH] IN BLOOD BY AUTOMATED COUNT: 3.51 M/UL (ref 4.3–5.9)
ERYTHROCYTE [DISTWIDTH] IN BLOOD BY AUTOMATED COUNT: 3.63 M/UL (ref 4.3–5.9)
ERYTHROCYTE [DISTWIDTH] IN BLOOD: 13 % (ref 11.5–15.5)
ERYTHROCYTE [DISTWIDTH] IN BLOOD: 13.2 % (ref 11.5–15.5)
GLUCOSE SERPL-MCNC: 91 MG/DL (ref 74–99)
HCT VFR BLD AUTO: 33.3 % (ref 39–53)
HCT VFR BLD AUTO: 33.5 % (ref 39–53)
HGB BLD-MCNC: 10.7 GM/DL (ref 13–17.5)
HGB BLD-MCNC: 10.9 GM/DL (ref 13–17.5)
INR PPP: 1.3 (ref ?–1.2)
LYMPHOCYTES # SPEC AUTO: 1 K/UL (ref 1–4.8)
LYMPHOCYTES # SPEC AUTO: 1.3 K/UL (ref 1–4.8)
LYMPHOCYTES NFR SPEC AUTO: 7 %
LYMPHOCYTES NFR SPEC AUTO: 9 %
MAGNESIUM SPEC-SCNC: 2 MG/DL (ref 1.6–2.3)
MCH RBC QN AUTO: 30.1 PG (ref 25–35)
MCH RBC QN AUTO: 30.4 PG (ref 25–35)
MCHC RBC AUTO-ENTMCNC: 31.9 G/DL (ref 31–37)
MCHC RBC AUTO-ENTMCNC: 32.8 G/DL (ref 31–37)
MCV RBC AUTO: 91.7 FL (ref 80–100)
MCV RBC AUTO: 95.4 FL (ref 80–100)
MONOCYTES # BLD AUTO: 0.8 K/UL (ref 0–1)
MONOCYTES # BLD AUTO: 1 K/UL (ref 0–1)
MONOCYTES NFR BLD AUTO: 5 %
MONOCYTES NFR BLD AUTO: 7 %
NEUTROPHILS # BLD AUTO: 10.9 K/UL (ref 1.3–7.7)
NEUTROPHILS # BLD AUTO: 13 K/UL (ref 1.3–7.7)
NEUTROPHILS NFR BLD AUTO: 81 %
NEUTROPHILS NFR BLD AUTO: 86 %
PLATELET # BLD AUTO: 235 K/UL (ref 150–450)
PLATELET # BLD AUTO: 264 K/UL (ref 150–450)
POTASSIUM SERPL-SCNC: 4.5 MMOL/L (ref 3.5–5.1)
PT BLD: 12.2 SEC (ref 9–12)
SODIUM SERPL-SCNC: 139 MMOL/L (ref 137–145)
WBC # BLD AUTO: 13.6 K/UL (ref 3.8–10.6)
WBC # BLD AUTO: 15.2 K/UL (ref 3.8–10.6)

## 2018-09-12 RX ADMIN — ACETAMINOPHEN PRN MG: 325 TABLET, FILM COATED ORAL at 20:02

## 2018-09-12 RX ADMIN — DEXTROSE MONOHYDRATE SCH MLS/HR: 5 INJECTION, SOLUTION INTRAVENOUS at 04:12

## 2018-09-12 RX ADMIN — PANTOPRAZOLE SODIUM SCH MG: 40 INJECTION, POWDER, FOR SOLUTION INTRAVENOUS at 20:06

## 2018-09-12 RX ADMIN — DEXTROSE MONOHYDRATE SCH MLS/HR: 5 INJECTION, SOLUTION INTRAVENOUS at 21:56

## 2018-09-12 RX ADMIN — CEFAZOLIN SCH MLS/HR: 330 INJECTION, POWDER, FOR SOLUTION INTRAMUSCULAR; INTRAVENOUS at 16:27

## 2018-09-12 RX ADMIN — SODIUM CHLORIDE SCH MLS/HR: 9 INJECTION, SOLUTION INTRAVENOUS at 21:56

## 2018-09-12 RX ADMIN — PANTOPRAZOLE SODIUM SCH MG: 40 INJECTION, POWDER, FOR SOLUTION INTRAVENOUS at 08:18

## 2018-09-12 RX ADMIN — CEFAZOLIN SCH MLS/HR: 330 INJECTION, POWDER, FOR SOLUTION INTRAMUSCULAR; INTRAVENOUS at 04:13

## 2018-09-12 RX ADMIN — ACETAMINOPHEN PRN MG: 325 TABLET, FILM COATED ORAL at 09:59

## 2018-09-12 RX ADMIN — ATORVASTATIN CALCIUM SCH MG: 40 TABLET, FILM COATED ORAL at 20:05

## 2018-09-12 RX ADMIN — DEXTROSE MONOHYDRATE SCH MLS/HR: 5 INJECTION, SOLUTION INTRAVENOUS at 11:51
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
## 2018-09-13 LAB
ALBUMIN SERPL-MCNC: 2.3 G/DL (ref 3.5–5)
ALP SERPL-CCNC: 110 U/L (ref 38–126)
ALT SERPL-CCNC: 39 U/L (ref 21–72)
ANION GAP SERPL CALC-SCNC: 5 MMOL/L
AST SERPL-CCNC: 22 U/L (ref 17–59)
BASOPHILS # BLD AUTO: 0 K/UL (ref 0–0.2)
BASOPHILS # BLD AUTO: 0.1 K/UL (ref 0–0.2)
BASOPHILS NFR BLD AUTO: 0 %
BASOPHILS NFR BLD AUTO: 0 %
BUN SERPL-SCNC: 16 MG/DL (ref 9–20)
CALCIUM SPEC-MCNC: 8 MG/DL (ref 8.4–10.2)
CHLORIDE SERPL-SCNC: 112 MMOL/L (ref 98–107)
CO2 SERPL-SCNC: 23 MMOL/L (ref 22–30)
EOSINOPHIL # BLD AUTO: 0.2 K/UL (ref 0–0.7)
EOSINOPHIL # BLD AUTO: 0.3 K/UL (ref 0–0.7)
EOSINOPHIL NFR BLD AUTO: 1 %
EOSINOPHIL NFR BLD AUTO: 2 %
ERYTHROCYTE [DISTWIDTH] IN BLOOD BY AUTOMATED COUNT: 2.82 M/UL (ref 4.3–5.9)
ERYTHROCYTE [DISTWIDTH] IN BLOOD BY AUTOMATED COUNT: 2.95 M/UL (ref 4.3–5.9)
ERYTHROCYTE [DISTWIDTH] IN BLOOD: 13.2 % (ref 11.5–15.5)
ERYTHROCYTE [DISTWIDTH] IN BLOOD: 13.2 % (ref 11.5–15.5)
GLUCOSE SERPL-MCNC: 83 MG/DL (ref 74–99)
HCT VFR BLD AUTO: 26.4 % (ref 39–53)
HCT VFR BLD AUTO: 27.7 % (ref 39–53)
HGB BLD-MCNC: 8.5 GM/DL (ref 13–17.5)
HGB BLD-MCNC: 9.3 GM/DL (ref 13–17.5)
LYMPHOCYTES # SPEC AUTO: 0.8 K/UL (ref 1–4.8)
LYMPHOCYTES # SPEC AUTO: 1.2 K/UL (ref 1–4.8)
LYMPHOCYTES NFR SPEC AUTO: 5 %
LYMPHOCYTES NFR SPEC AUTO: 9 %
MAGNESIUM SPEC-SCNC: 1.9 MG/DL (ref 1.6–2.3)
MCH RBC QN AUTO: 30.2 PG (ref 25–35)
MCH RBC QN AUTO: 31.7 PG (ref 25–35)
MCHC RBC AUTO-ENTMCNC: 32.2 G/DL (ref 31–37)
MCHC RBC AUTO-ENTMCNC: 33.6 G/DL (ref 31–37)
MCV RBC AUTO: 93.8 FL (ref 80–100)
MCV RBC AUTO: 94.2 FL (ref 80–100)
MONOCYTES # BLD AUTO: 0.8 K/UL (ref 0–1)
MONOCYTES # BLD AUTO: 0.9 K/UL (ref 0–1)
MONOCYTES NFR BLD AUTO: 5 %
MONOCYTES NFR BLD AUTO: 6 %
NEUTROPHILS # BLD AUTO: 11.3 K/UL (ref 1.3–7.7)
NEUTROPHILS # BLD AUTO: 14.3 K/UL (ref 1.3–7.7)
NEUTROPHILS NFR BLD AUTO: 81 %
NEUTROPHILS NFR BLD AUTO: 88 %
PLATELET # BLD AUTO: 248 K/UL (ref 150–450)
PLATELET # BLD AUTO: 287 K/UL (ref 150–450)
POTASSIUM SERPL-SCNC: 3.9 MMOL/L (ref 3.5–5.1)
PROT SERPL-MCNC: 5.4 G/DL (ref 6.3–8.2)
SODIUM SERPL-SCNC: 140 MMOL/L (ref 137–145)
WBC # BLD AUTO: 13.9 K/UL (ref 3.8–10.6)
WBC # BLD AUTO: 16.3 K/UL (ref 3.8–10.6)

## 2018-09-13 RX ADMIN — ACETAMINOPHEN PRN MG: 325 TABLET, FILM COATED ORAL at 16:11

## 2018-09-13 RX ADMIN — ATORVASTATIN CALCIUM SCH MG: 40 TABLET, FILM COATED ORAL at 20:29

## 2018-09-13 RX ADMIN — SODIUM CHLORIDE SCH MLS/HR: 9 INJECTION, SOLUTION INTRAVENOUS at 17:53

## 2018-09-13 RX ADMIN — DEXTROSE MONOHYDRATE SCH MLS/HR: 5 INJECTION, SOLUTION INTRAVENOUS at 22:34

## 2018-09-13 RX ADMIN — CEFAZOLIN SCH: 330 INJECTION, POWDER, FOR SOLUTION INTRAMUSCULAR; INTRAVENOUS at 05:11

## 2018-09-13 RX ADMIN — PANTOPRAZOLE SODIUM SCH MG: 40 INJECTION, POWDER, FOR SOLUTION INTRAVENOUS at 20:29

## 2018-09-13 RX ADMIN — PANTOPRAZOLE SODIUM SCH MG: 40 INJECTION, POWDER, FOR SOLUTION INTRAVENOUS at 09:53

## 2018-09-13 RX ADMIN — CEFAZOLIN SCH MLS/HR: 330 INJECTION, POWDER, FOR SOLUTION INTRAMUSCULAR; INTRAVENOUS at 19:17

## 2018-09-13 RX ADMIN — DEXTROSE MONOHYDRATE SCH MLS/HR: 5 INJECTION, SOLUTION INTRAVENOUS at 05:58

## 2018-09-13 RX ADMIN — DEXTROSE MONOHYDRATE SCH MLS/HR: 5 INJECTION, SOLUTION INTRAVENOUS at 11:32

## 2018-09-13 RX ADMIN — CEFAZOLIN SCH MLS/HR: 330 INJECTION, POWDER, FOR SOLUTION INTRAMUSCULAR; INTRAVENOUS at 09:42

## 2018-09-13 RX ADMIN — SODIUM CHLORIDE SCH MLS/HR: 9 INJECTION, SOLUTION INTRAVENOUS at 05:59

## 2018-09-13 NOTE — P.PN
Subjective


Progress Note Date: 09/13/18





78-year-old male sitting up in bed.  Continues to report no nausea no vomiting 

no abdominal pain.  Patient stated that he did have a bowel movement this 

morning dark in color.  No bright red blood.  Hemoglobin 9.3 this morning   

afebrile temp maxed at 101


 consult for infectious disease Dr. Santana preliminary blood cultures show gram-

positive cocci in clusters


Being followed by surgical service floor complicated acute sigmoid 

diverticulitis with phlegom noted in the sigmoid





Objective





- Vital Signs


Vital signs: 


 Vital Signs











Temp  98.4 F   09/13/18 11:23


 


Pulse  88   09/13/18 11:23


 


Resp  18   09/13/18 11:23


 


BP  149/89   09/13/18 11:23


 


Pulse Ox  96   09/13/18 11:23








 Intake & Output











 09/12/18 09/13/18 09/13/18





 18:59 06:59 18:59


 


Intake Total 1077.5 1700 580


 


Output Total 875 700 225


 


Balance 202.5 1000 355


 


Weight  109.5 kg 


 


Intake:   


 


   1100 


 


    Sodium Chloride 0.9% 1, 800 1100 





    000 ml @ 100 mls/hr IV .   





    Q10H Replaced by [facility] Rx#:[id number]   


 


  Intake, IV Titration 37.5 600 





  Amount   


 


    Piperacillin-Tazobactam 3 12.5  





    .375 gm In Dextrose/Water   





    1 50ml.bag @ 12.5 mls/hr   





    IVPB ONCE [facility] Rx#:   





    [id number]   


 


    Piperacillin-Tazobactam 3 25.0 100 





    .375 gm In Dextrose/Water   





    1 50ml.bag @ 12.5 mls/hr   





    IVPB Q8H Replaced by [facility] Rx#:   





    [id number]   


 


    Vancomycin 1,750 mg In  500 





    Sodium Chloride 0.9% 500   





    ml @ 167 mls/hr IVPB BID@   





    0600,1800 Replaced by [facility] Rx#:   





    [id number]   


 


  Oral 240  580


 


Output:   


 


  Urine 875 700 225


 


Other:   


 


  Voiding Method Urinal Toilet Toilet





  Urinal Urinal


 


  # Voids   1


 


  # Bowel Movements  2 0














- Exam





Physical exam


78-year-old male sitting up in bed talkative pleasant states has no abdominal 

pain and nausea no vomiting


Lungs adequate air movement bilaterally no shortness of breath


Heart S1-S2 audible regular


Abdomen obese soft nontender nontender palpable infraumbilical mass urinating 

no difficulty no nausea no vomiting and tolerating a diet


Extremities no edema





- Labs


CBC & Chem 7: 


 09/13/18 06:31





 09/13/18 06:31


Labs: 


 Abnormal Lab Results - Last 24 Hours (Table)











  09/12/18 09/13/18 09/13/18 Range/Units





  11:00 06:31 06:31 


 


WBC  15.2 H  13.9 H   (3.8-10.6)  k/uL


 


RBC  3.63 L  2.95 L   (4.30-5.90)  m/uL


 


Hgb  10.9 L  9.3 L D   (13.0-17.5)  gm/dL


 


Hct  33.3 L  27.7 L   (39.0-53.0)  %


 


Neutrophils #  13.0 H  11.3 H   (1.3-7.7)  k/uL


 


Chloride    112 H  ()  mmol/L


 


Calcium    8.0 L  (8.4-10.2)  mg/dL


 


Total Protein    5.4 L  (6.3-8.2)  g/dL


 


Albumin    2.3 L  (3.5-5.0)  g/dL








 Microbiology - Last 24 Hours (Table)











 09/11/18 17:52 Blood Culture Gram Stain - Preliminary





 Blood 


 


 09/11/18 16:52 Urine Culture - Final





 Urine,Clean Catch 


 


 09/11/18 17:52 Blood Culture - Final





 Blood 














Assessment and Plan


Assessment: 





Impression


Present on admission 2 large bloody stools suspect GI bleed due to acute 

sigmoid diverticulitis


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


Leukocytosis febrile present on admission


Masslike collection per computed tomography scan of the abdomen between sigmoid 

and urinary bladder noted


Patchy opacity left lower lobe


Computed tomography scan abdomen and pelvis show complicated acute sigmoid 

diverticulitis with [id number] phlegmon to the post between the sigmoid and the 

urinary bladder


Present on admission 2 large bloody stools 1 unit packed red blood cells given 

in the emergency room suspect GI bleed


Persistent fever with preliminary blood culture gram-positive cocci in clusters


Component of acute blood loss anemia








Plan


Repeat labs in the morning


Will follow with you closely addressing surgical issues as they arise


IV Zosyn as ordered


DVT and GI prophylaxis


Nutritional supplements as ordered


Will treat patient conservatively now with IV antibiotics monitoring the 

response


Patient would benefit from an EGD and a colonoscopy when clinically stable in 

the outpatient setting this can be arranged not plan this admission


Await infectious disease input persistent fever with preliminary blood culture 

gram-positive cocci in clusters














The above impression and plan of care have been discussed and directed by 

signing physician. Samira Schaeffer nurse practitioner acting as scribe for signing 

physician.

## 2018-09-13 NOTE — P.PN
Subjective


Progress Note Date: 09/13/18


Principal diagnosis: 





GI bleed





78-year-old male patient, previous history of CVA, came into the hospital 

yesterday because of generalized weakness.  He was feeling dizzy and had a bout 

of syncope in the emergency department.  He immediately recovered.  No cardiac 

arrhythmias noted.  No chest pain.  His neurologic exam was nonfocal.  

Subsequently was found to have a low-grade fever.  At the later stage he 

developed a GI bleed and based on the emergency staff the patient had a bright 

red blood per rectum.  Because of the significant amount of blood that was noted

, the patient was given a unit of packed RBC.  Hemoglobin this morning is down 

to 10.7 from a baseline of 12.8.  He did have a low-grade fever in the 

emergency department with a white cell count of 20.1.  CAT scan of the abdomen 

was done and it showed sigmoid diverticulosis and an area of an abnormal 

thickening of the bowel which is ill-defined measuring around 8 x 7 x 4 cm in 

size anterior to the midline possibly a phlegmon versus a mass juxtaposed 

between the sigmoid and the urinary bladder.  The patient does not have any 

signs or symptoms of UTI.  No fecal material passing gas through his penis.  UA 

is negative.  He is hemodynamically stable.  He was placed on IV Zosyn.  White 

cell count is improving.  I examination today he has a firm infraumbilical mass 

that can be easily palpated.  No direct tenderness.  No rebound tensile 

guarding.  Cultures of been all negative thus far.  His EKG is normal sinus and 

the patient doesn't have any arrhythmias noted.  CAT scan of the brain and the 

CAT scan of the soft tissue of the neck were both negative.  No history of any 

constipation.  No history of any change in bowel habits.  The patient has not 

had a colonoscopy in the past.





The patient is seen again today 09/13/2018 in follow-up on the selective care 

unit.  He is currently resting quite comfortably in bed.  He denies any further 

abdominal discomfort.  No nausea or vomiting.  He did have a dark bowel 

movement this morning.  No noted bright red bleeding.  He denies any shortness 

of breath, cough or congestion.  No chest pain.  He is maintaining good O2 

saturations in the upper 90s on room air.  He's been afebrile.  Hemodynamically 

stable.  White count 13.9.  Hemoglobin 9.3.  Creatinine 0.82.  He remains on IV 

Protonix.  The plan is for probable EGD/colonoscopy in the outpatient setting.





Objective





- Vital Signs


Vital signs: 


 Vital Signs











Temp  98.4 F   09/13/18 11:23


 


Pulse  88   09/13/18 11:23


 


Resp  18   09/13/18 11:23


 


BP  149/89   09/13/18 11:23


 


Pulse Ox  96   09/13/18 11:23








 Intake & Output











 09/12/18 09/13/18 09/13/18





 18:59 06:59 18:59


 


Intake Total 1077.5 1700 580


 


Output Total 875 700 225


 


Balance 202.5 1000 355


 


Weight  109.5 kg 


 


Intake:   


 


   1100 


 


    Sodium Chloride 0.9% 1, 800 1100 





    000 ml @ 100 mls/hr IV .   





    Q10H [facility] Rx#:[id number]   


 


  Intake, IV Titration 37.5 600 





  Amount   


 


    Piperacillin-Tazobactam 3 12.5  





    .375 gm In Dextrose/Water   





    1 50ml.bag @ 12.5 mls/hr   





    IVPB ONCE [facility] Rx#:   





    [id number]   


 


    Piperacillin-Tazobactam 3 25.0 100 





    .375 gm In Dextrose/Water   





    1 50ml.bag @ 12.5 mls/hr   





    IVPB Q8H [facility] Rx#:   





    [id number]   


 


    Vancomycin 1,750 mg In  500 





    Sodium Chloride 0.9% 500   





    ml @ 167 mls/hr IVPB BID@   





    0600,1800 [facility] Rx#:   





    [id number]   


 


  Oral 240  580


 


Output:   


 


  Urine 875 700 225


 


Other:   


 


  Voiding Method Urinal Toilet Toilet





  Urinal Urinal


 


  # Voids   1


 


  # Bowel Movements  2 0














- Exam





Gen. appearance, comfortable likely distress


Head exam was generally normal. There was no scleral icterus or corneal arcus. 

Mucous membranes were moist.


Neck was supple and without jugular venous distension, thyromegaly, or carotid 

bruits. Carotids were easily palpable bilaterally. There was no adenopathy.


Lungs were clear to auscultation and percussion, and with normal diaphragmatic 

excursion. No wheezes or rales were noted. 


Cardiac exam revealed the PMI to be normally situated and sized. The rhythm was 

regular and no extrasystoles were noted during several minutes of auscultation. 

The first and second heart sounds were normal and physiologic splitting of the 

second heart sound was noted. There were no murmurs, rubs, clicks, or gallops.


Abdomen is soft and there is an infraumbilical firmness and masslike collection 

that can be easily palpated and this is nontender probably around 5-6 cm in 

size.  No ascites.  No rebound tenderness no guarding.  Bowel sounds are 

present.  No focal tenderness.


Examination of the extremities revealed easily palpable radial, femoral and 

pedal pulses. There was no cyanosis, clubbing or edema.


Examination of the skin revealed no evidence of significant rashes, suspicious 

appearing nevi or other concerning lesions.


Neurologically awake and alert and is no focal neurological deficit.





- Labs


CBC & Chem 7: 


 09/13/18 06:31





 09/13/18 06:31


Labs: 


 Abnormal Lab Results - Last 24 Hours (Table)











  09/13/18 09/13/18 Range/Units





  06:31 06:31 


 


WBC  13.9 H   (3.8-10.6)  k/uL


 


RBC  2.95 L   (4.30-5.90)  m/uL


 


Hgb  9.3 L D   (13.0-17.5)  gm/dL


 


Hct  27.7 L   (39.0-53.0)  %


 


Neutrophils #  11.3 H   (1.3-7.7)  k/uL


 


Chloride   112 H  ()  mmol/L


 


Calcium   8.0 L  (8.4-10.2)  mg/dL


 


Total Protein   5.4 L  (6.3-8.2)  g/dL


 


Albumin   2.3 L  (3.5-5.0)  g/dL








 Microbiology - Last 24 Hours (Table)











 09/11/18 17:52 Blood Culture Gram Stain - Preliminary





 Blood 


 


 09/11/18 16:52 Urine Culture - Final





 Urine,Clean Catch 


 


 09/11/18 17:52 Blood Culture - Final





 Blood 














Assessment and Plan


Assessment: 





Assessment





1 acute sigmoid diverticulitis with low-grade fever and leukocytosis currently 

on IV Zosyn.





2 masslike collection within the abdomen measuring  8 x 7 x 5 cm in size in 

between the sigmoid and the urinary bladder.  This could be an infected 

complicated diverticulitis segment of the bowel.  Underlying colon mass cannot 

be completely excluded knowing that the patient has had significant amount of 

GI bleed which is unusual for acute diverticulitis.





3 patchy opacity left lower lobe, couldn't examine underlying inflammatory 

pneumonia although clinically the patient does not show any signs or symptoms 

of pneumonia at this point in time





4 previous history of CVA





5 leukocytosis improving





6 syncope with a negative CAT scan of the head.





Plan





The patient was seen and evaluated by Dr. Walker.  He is currently stable 

from the pulmonary and critical care standpoint.  General surgery and GI 

consults are consulted.  The plan is for probable outpatient workup for the GI 

bleeding.  We will follow the patient an as-needed basis.





I, the cosigning physician, performed a history & physical examination of the 

patient. Lungs sounds are clear.  Maintaining good O2 saturations in the 90s on 

room air.  I discussed the assessment and plan of care with my nurse 

practitioner, Zeinab Castillo. I attest to the above note as dictated by her.

## 2018-09-13 NOTE — CDI
Last Revision, December 2017





Documentation Clarification Form



Date: 9/13/2018 3:30:15 PM

From: Katty GerberCentenoCORKY, CCDS

Phone: [phone]

MRN: [id number]

Admit Date: 9/11/2018 5:36:00 PM

Patient Name: Nilda More

Visit Number: [id number]

Discharge Date:



ATTENTION: The Clinical Documentation Specialists (CDI) and [facility] Coding Staff 
appreciate your assistance in clarifying documentation. Please respond to the 
clarification below the line at the bottom and electronically sign. The CDI & 
[facility] Coding staff will review the response and follow-up if needed. Please note: 
Queries are made part of the Legal Health Record. If you have any questions, 
please contact the author of this message via ITS.



Ezekiel Schmitt MD:



Per the GI consult on 9/13: "...admitted with sepsis with multiple 
constitutional symptoms such as fever, leukocytosis, weakness, fatigue, change 
in appetite, unintentional weight loss and darker colored bowel movements 
suggestive of acute GI bleed possibly colonic diverticular..." 



History/Risk Factors: CVA, Hypertension, Cholecystectomy, former smoker.

Clinical Indicators: 

VS: T 101.0^, //73

LAB: WBC 20.1^, Neut 16.3^  

Blood culture: Coagulase neg Staph (final pending)

 

Treatment: IV fluid bolus, IV Rocephin, IV Flagyl, IV Protonix, IV Tylenol, IV 
Zosyn

Consults: GI, Surgery, Pulmonary/Critical Care, ID:



In your professional opinion, please clarify if these findings signify one of 
the following conditions and whether the condition is POA, and cause, if known: 



    Sepsis ruled in or ruled out

    Severe Sepsis 

    Septic Shock

    Other, please specify

    Unable to determine



    Present on Admission:

      o Yes

      o No



    Identify the (suspected) organism if known: 



____________________________________________________________________

unable to determine

MTDD

## 2018-09-13 NOTE — P.PN
Subjective


Progress Note Date: 09/13/18


This is a 78-year-old male patient of Dr. Kinney.  Patient presented to the 

hospital with feeling of increased weakness and dizziness.  Upon arrival to 

emergency room patient had 2 large bloody bowel movements.  Patient has known 

past medical history of CVA and hypertension.  Patient was given 1 unit of 

blood in the emergency room.  Patient was on Augmentin prior to hospitalization 

for dental work.  CT a of the head and neck completed showing no significant 

acute abnormality.  No significant change from recent CT neck study.  Chest x-

ray completed showing chronic changes and cardiomegaly without acute pulmonary 

process.  CT of head completed showing no acute intracranial hemorrhage or 

midline shift.  There is moderate diffuse age-related cerebral atrophy and 

chronic small vessel ischemic changes redemonstrated no significant change from 

recent CT.  CT of abdomen and pelvis completed showing compensated acute 

sigmoid diverticulitis, with a 8 x 7 x 5 cm Phlegmon juxtaposed between the 

sigmoid in the urinary bladder.  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 

consolidative obesity.  If no prior CTs available to ensure stability over time 

the instruments follow-up chest CT is recommended.  Dr. Larson per critical 

care and pulmonary services consulted.  Dr. Barrios per surgical service is 

consulted.  WBC upon arrival 20.1.  Patient currently on Zosyn for IV 

antibiotics.  Hemoglobin 10.7.  Patient did receive 1 unit of PRBCs.  Urine and 

blood cultures have been ordered.  At this time patient denies chest pain or 

shortness of breath.  Patient denies nausea vomiting or diarrhea.  Patient 

denies any urinary burning or frequency.  Abdominal mass felt in lower left 

quadrant.  Discussed case with Dr. Larson per critical care.  Dr. King per GI 

services have been consulted. Carcinoembryonic antigen ordered per Critical 

care.





On 09/13/2018 patient has been moved out of the intensive care unit.  Patient 

is currently alert and oriented 3 resting comfortably in bed denies any 

complaints at this time.  Patient does state he has had a bowel movement that 

appeared dark this a.m.  Patient denies any nausea or vomiting.  Patient denies 

chest pain or shortness of breath.  Denies any urinary burning or frequency.  

Patient is currently on a clear liquid diet per surgical services








Objective





- Vital Signs


Vital signs: 


 Vital Signs











Temp  98.1 F   09/13/18 09:53


 


Pulse  84   09/13/18 09:53


 


Resp  18   09/13/18 09:53


 


BP  145/79   09/13/18 09:53


 


Pulse Ox  98   09/13/18 09:53








 Intake & Output











 09/12/18 09/13/18 09/13/18





 18:59 06:59 18:59


 


Intake Total 1077.5 1700 580


 


Output Total 875 700 225


 


Balance 202.5 1000 355


 


Weight  109.5 kg 


 


Intake:   


 


   1100 


 


    Sodium Chloride 0.9% 1, 800 1100 





    000 ml @ 100 mls/hr IV .   





    Q10H [facility] Rx#:[id number]   


 


  Intake, IV Titration 37.5 600 





  Amount   


 


    Piperacillin-Tazobactam 3 12.5  





    .375 gm In Dextrose/Water   





    1 50ml.bag @ 12.5 mls/hr   





    IVPB ONCE [facility] Rx#:   





    [id number]   


 


    Piperacillin-Tazobactam 3 25.0 100 





    .375 gm In Dextrose/Water   





    1 50ml.bag @ 12.5 mls/hr   





    IVPB Q8H [facility] Rx#:   





    [id number]   


 


    Vancomycin 1,750 mg In  500 





    Sodium Chloride 0.9% 500   





    ml @ 167 mls/hr IVPB BID@   





    0600,1800 [facility] Rx#:   





    [id number]   


 


  Oral 240  580


 


Output:   


 


  Urine 875 700 225


 


Other:   


 


  Voiding Method Urinal Toilet Toilet





  Urinal Urinal


 


  # Voids   1


 


  # Bowel Movements  2 0














- Exam


Head normocephalic


Neck supple


Lungs clear to auscultation bilaterally no wheezing or crackles


Heart regular rate and rhythm S1-S2, no rub or gallop


Abdomen is soft nontender nondistended positive bowel sounds no 

hepatosplenomegaly.  Left lower quadrant abdominal mass


Extremities no edema


Neuro alert and orientated to 








- Labs


CBC & Chem 7: 


 09/13/18 06:31





 09/13/18 06:31


Labs: 


 Abnormal Lab Results - Last 24 Hours (Table)











  09/12/18 09/13/18 09/13/18 Range/Units





  11:00 06:31 06:31 


 


WBC  15.2 H  13.9 H   (3.8-10.6)  k/uL


 


RBC  3.63 L  2.95 L   (4.30-5.90)  m/uL


 


Hgb  10.9 L  9.3 L D   (13.0-17.5)  gm/dL


 


Hct  33.3 L  27.7 L   (39.0-53.0)  %


 


Neutrophils #  13.0 H  11.3 H   (1.3-7.7)  k/uL


 


Chloride    112 H  ()  mmol/L


 


Calcium    8.0 L  (8.4-10.2)  mg/dL


 


Total Protein    5.4 L  (6.3-8.2)  g/dL


 


Albumin    2.3 L  (3.5-5.0)  g/dL








 Microbiology - Last 24 Hours (Table)











 09/11/18 17:52 Blood Culture Gram Stain - Preliminary





 Blood 


 


 09/11/18 16:52 Urine Culture - Final





 Urine,Clean Catch 


 


 09/11/18 17:52 Blood Culture - Final





 Blood 














Assessment and Plan


Assessment: 


1 GI bleed secondary to acute sigmoid diverticulitis.  CT of the abdomen and 

pelvis completed showing complicated acute sigmoid diverticulitis with a 8 x 7 

x 5 CM Phlegmon juxtaposed between the sigmoid and the urinary bladder.  Dr. Barrios per surgical services consulted.  Patient currently nothing by mouth.  

Surgical services have been consulted.  Hemoglobin 10.7 patient did receive 1 

unit of PRBCs in emergency room.  Hemoglobin 9.3.  Per surgical services will 

treat patient conservatively now with IV antibiotic.





2. masslike collection within the abdomen measuring  8 x 7 x 5 cm in size in 

between the sigmoid and the urinary bladder.  GI services have been consulted 

per critical care and CEA ordered.  CEA level 1. per GI services patient will 

benefit from interventional radiology percutaneous drainage but will refer to 

general surgical services at this time.  Discussed case in depth labeled with 

GI service is planning for EGD and colonoscopy outpatient however timing will 

be decided on clinical course ideally would like to wait until chronic Phlegmon 

has improved with resolution of leukocytosis and fever.  No surgical 

intervention planned at this time per surgical services





3. patchy opacity left lower lobe.  CTA completed showing left lower lobe 5 x 5 

x 3 cm pulmonary consolidative opacity.  Dr. Marsha dahl for pulmonary 

services.





4. previous history of CVA





5. leukocytosis.  Patient did have elevated temperature on arrival to emergency 

room. Blood and urine cultures have been ordered.  Patient currently on IV 

Zosyn and vancomycin for antibiotics.  Dr. Santana per infectious disease has 

been consulted





6. syncope.  CT of head completed showing no acute intracranial hemorrhage or 

midline shift.  There is moderate diffuse age-related cerebral atrophy and 

chronic small vessel ischemic change redemonstrated.  No significant change 

from recent CT





7.  Recent dental work.  Patient was previously taking Augmentin at home.  CTA 

completed showing no significant acute abnormality.  No significant change from 

recent CT of neck study.





GI prophylaxis Protonix





I performed an examination of the patient and discussed their management with 

the Nurse Practitioner.  I have reviewed the Nurse Practitioner's notes and 

agree with the documented findings and plan of care

## 2018-09-13 NOTE — P.PN
Subjective


Progress Note Date: 09/13/18


Principal diagnosis: 


Sepsis secondary to complicated sigmoid diverticulitis GI bleed anemia





Feels well.  Denies abdominal pain.  Denies bleeding.  Blood cultures 

preliminary gram-positive cocci in clusters.  T-max 101.0 24 hours.  White 

count 13.9.  Hemoglobin 9.3.  Platelet 240.  INR 1.3.  BUN 16.  Creatinine 0.8.

  CEA 1.2.








Objective





- Vital Signs


Vital signs: 


 Vital Signs











Temp  98.1 F   09/13/18 09:53


 


Pulse  84   09/13/18 09:53


 


Resp  18   09/13/18 09:53


 


BP  145/79   09/13/18 09:53


 


Pulse Ox  98   09/13/18 09:53








 Intake & Output











 09/12/18 09/13/18 09/13/18





 18:59 06:59 18:59


 


Intake Total 1077.5 1700 580


 


Output Total 875 700 225


 


Balance 202.5 1000 355


 


Weight  109.5 kg 


 


Intake:   


 


   1100 


 


    Sodium Chloride 0.9% 1, 800 1100 





    000 ml @ 100 mls/hr IV .   





    Q10H [facility] Rx#:[id number]   


 


  Intake, IV Titration 37.5 600 





  Amount   


 


    Piperacillin-Tazobactam 3 12.5  





    .375 gm In Dextrose/Water   





    1 50ml.bag @ 12.5 mls/hr   





    IVPB ONCE [facility] Rx#:   





    [id number]   


 


    Piperacillin-Tazobactam 3 25.0 100 





    .375 gm In Dextrose/Water   





    1 50ml.bag @ 12.5 mls/hr   





    IVPB Q8H [facility] Rx#:   





    [id number]   


 


    Vancomycin 1,750 mg In  500 





    Sodium Chloride 0.9% 500   





    ml @ 167 mls/hr IVPB BID@   





    0600,1800 [facility] Rx#:   





    [id number]   


 


  Oral 240  580


 


Output:   


 


  Urine 875 700 225


 


Other:   


 


  Voiding Method Urinal Toilet Toilet





  Urinal Urinal


 


  # Voids   1


 


  # Bowel Movements  2 0














- Exam


General appearance: The patient is alert, oriented, in no acute distress.


HET: Head is normocephalic and atraumatic.  Pupils are equal and reactive.  

Oropharynx is clear without lesions.


Neck: Supple without lymphadenopathy.  Trachea midline.


Heart: S1 S2.  Regular rate and rhythm.


Lungs: No crackles or wheezes are heard.


Abdomen: Soft, nontender, nondistended with  bowel sounds.  No peritoneal 

signs.  Palpable infraumbilical mass nontender.


Extremities: Normal skin color and turgor.  No cyanosis, rash, ulceration, 

clubbing, or edema.  Radial and pedal pulses are 2/4 bilaterally.


Neurological: No focal deficits.  Strength and sensation are grossly intact.








- Labs


CBC & Chem 7: 


 09/13/18 06:31





 09/13/18 06:31


Labs: 


 Abnormal Lab Results - Last 24 Hours (Table)











  09/12/18 09/13/18 09/13/18 Range/Units





  11:00 06:31 06:31 


 


WBC  15.2 H  13.9 H   (3.8-10.6)  k/uL


 


RBC  3.63 L  2.95 L   (4.30-5.90)  m/uL


 


Hgb  10.9 L  9.3 L D   (13.0-17.5)  gm/dL


 


Hct  33.3 L  27.7 L   (39.0-53.0)  %


 


Neutrophils #  13.0 H  11.3 H   (1.3-7.7)  k/uL


 


Chloride    112 H  ()  mmol/L


 


Calcium    8.0 L  (8.4-10.2)  mg/dL


 


Total Protein    5.4 L  (6.3-8.2)  g/dL


 


Albumin    2.3 L  (3.5-5.0)  g/dL








 Microbiology - Last 24 Hours (Table)











 09/11/18 17:52 Blood Culture Gram Stain - Preliminary





 Blood 


 


 09/11/18 16:52 Urine Culture - Final





 Urine,Clean Catch 


 


 09/11/18 17:52 Blood Culture - Final





 Blood 














Assessment and Plan


(1) Diverticulitis


Narrative/Plan: 


78-year-old gentleman admitted with sepsis with multiple constitutional 

symptoms such as fever, leukocytosis, weakness, fatigue, change in appetite, 

unintentional weight loss and darker colored bowel movements suggestive of 

acute GI bleed possibly colonic diverticular with CT abdominal imaging 

suggestive of acute complicated sigmoid diverticulitis with juxtaposed fluid 

phlegmon collection between the urinary bladder and colon. Underlying neoplasm 

cannot be entirely excluded. No history of colonoscopy screening.





Persistent fever T-max 101.0 with preliminary blood cultures gram-positive 

cocci in clusters.


Current Visit: Yes   Status: Acute   Code(s): K57.92 - DVTRCLI OF INTEST, PART 

UNSP, W/O PERF OR ABSCESS W/O BLEED   SNOMED Code(s): 207339041


   





(2) GI bleed


Narrative/Plan: 


Component of acute blood loss anemia.  Hemoglobin decreased to 9.3 this 

morning.  No active bleeding.  


Current Visit: Yes   Status: Acute   Code(s): K92.2 - GASTROINTESTINAL 

HEMORRHAGE, UNSPECIFIED   SNOMED Code(s): 25385444


   


Plan: 


1.  Recommend infectious disease consultation for evaluation of persistent 

fever and blood cultures as well as antibiotic guidance.


2.  Continue with IV antibiotics.  CBC monitoring.  Clear liquid diet.  

Agreeable for Ensure 3 times a day.  EGD colonoscopy recommended timing will be 

on clinical course but not planned at this time.


3.  We'll continue to follow with you.





Assessment and plan a care discussed with Dr. Rod
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
## 2018-09-14 LAB
ALBUMIN SERPL-MCNC: 2.7 G/DL (ref 3.5–5)
ALP SERPL-CCNC: 122 U/L (ref 38–126)
ALT SERPL-CCNC: 43 U/L (ref 21–72)
ANION GAP SERPL CALC-SCNC: 9 MMOL/L
AST SERPL-CCNC: 36 U/L (ref 17–59)
BASOPHILS # BLD AUTO: 0 K/UL (ref 0–0.2)
BASOPHILS # BLD AUTO: 0.1 K/UL (ref 0–0.2)
BASOPHILS NFR BLD AUTO: 0 %
BASOPHILS NFR BLD AUTO: 1 %
BUN SERPL-SCNC: 16 MG/DL (ref 9–20)
CALCIUM SPEC-MCNC: 8.6 MG/DL (ref 8.4–10.2)
CHLORIDE SERPL-SCNC: 115 MMOL/L (ref 98–107)
CO2 SERPL-SCNC: 20 MMOL/L (ref 22–30)
EOSINOPHIL # BLD AUTO: 0.1 K/UL (ref 0–0.7)
EOSINOPHIL # BLD AUTO: 0.3 K/UL (ref 0–0.7)
EOSINOPHIL NFR BLD AUTO: 1 %
EOSINOPHIL NFR BLD AUTO: 1 %
EOSINOPHIL NFR BLD AUTO: 2 %
EOSINOPHIL NFR BLD AUTO: 3 %
ERYTHROCYTE [DISTWIDTH] IN BLOOD BY AUTOMATED COUNT: 2.32 M/UL (ref 4.3–5.9)
ERYTHROCYTE [DISTWIDTH] IN BLOOD BY AUTOMATED COUNT: 2.36 M/UL (ref 4.3–5.9)
ERYTHROCYTE [DISTWIDTH] IN BLOOD BY AUTOMATED COUNT: 2.59 M/UL (ref 4.3–5.9)
ERYTHROCYTE [DISTWIDTH] IN BLOOD BY AUTOMATED COUNT: 3.15 M/UL (ref 4.3–5.9)
ERYTHROCYTE [DISTWIDTH] IN BLOOD: 13.2 % (ref 11.5–15.5)
ERYTHROCYTE [DISTWIDTH] IN BLOOD: 13.3 % (ref 11.5–15.5)
ERYTHROCYTE [DISTWIDTH] IN BLOOD: 13.4 % (ref 11.5–15.5)
ERYTHROCYTE [DISTWIDTH] IN BLOOD: 13.4 % (ref 11.5–15.5)
GLUCOSE SERPL-MCNC: 90 MG/DL (ref 74–99)
HCT VFR BLD AUTO: 21.5 % (ref 39–53)
HCT VFR BLD AUTO: 22.7 % (ref 39–53)
HCT VFR BLD AUTO: 24.2 % (ref 39–53)
HCT VFR BLD AUTO: 29.8 % (ref 39–53)
HGB BLD-MCNC: 7.3 GM/DL (ref 13–17.5)
HGB BLD-MCNC: 7.3 GM/DL (ref 13–17.5)
HGB BLD-MCNC: 8.1 GM/DL (ref 13–17.5)
HGB BLD-MCNC: 9.4 GM/DL (ref 13–17.5)
LYMPHOCYTES # SPEC AUTO: 1.3 K/UL (ref 1–4.8)
LYMPHOCYTES # SPEC AUTO: 1.6 K/UL (ref 1–4.8)
LYMPHOCYTES # SPEC AUTO: 1.8 K/UL (ref 1–4.8)
LYMPHOCYTES # SPEC AUTO: 2.3 K/UL (ref 1–4.8)
LYMPHOCYTES NFR SPEC AUTO: 13 %
LYMPHOCYTES NFR SPEC AUTO: 16 %
LYMPHOCYTES NFR SPEC AUTO: 16 %
LYMPHOCYTES NFR SPEC AUTO: 9 %
MAGNESIUM SPEC-SCNC: 1.9 MG/DL (ref 1.6–2.3)
MCH RBC QN AUTO: 29.9 PG (ref 25–35)
MCH RBC QN AUTO: 31 PG (ref 25–35)
MCH RBC QN AUTO: 31.1 PG (ref 25–35)
MCH RBC QN AUTO: 31.6 PG (ref 25–35)
MCHC RBC AUTO-ENTMCNC: 31.7 G/DL (ref 31–37)
MCHC RBC AUTO-ENTMCNC: 32.2 G/DL (ref 31–37)
MCHC RBC AUTO-ENTMCNC: 33.2 G/DL (ref 31–37)
MCHC RBC AUTO-ENTMCNC: 34 G/DL (ref 31–37)
MCV RBC AUTO: 93 FL (ref 80–100)
MCV RBC AUTO: 93.6 FL (ref 80–100)
MCV RBC AUTO: 94.5 FL (ref 80–100)
MCV RBC AUTO: 96.2 FL (ref 80–100)
MONOCYTES # BLD AUTO: 0.6 K/UL (ref 0–1)
MONOCYTES # BLD AUTO: 0.6 K/UL (ref 0–1)
MONOCYTES # BLD AUTO: 0.9 K/UL (ref 0–1)
MONOCYTES # BLD AUTO: 0.9 K/UL (ref 0–1)
MONOCYTES NFR BLD AUTO: 5 %
MONOCYTES NFR BLD AUTO: 5 %
MONOCYTES NFR BLD AUTO: 6 %
MONOCYTES NFR BLD AUTO: 6 %
NEUTROPHILS # BLD AUTO: 12.4 K/UL (ref 1.3–7.7)
NEUTROPHILS # BLD AUTO: 14.2 K/UL (ref 1.3–7.7)
NEUTROPHILS # BLD AUTO: 7.1 K/UL (ref 1.3–7.7)
NEUTROPHILS # BLD AUTO: 8.6 K/UL (ref 1.3–7.7)
NEUTROPHILS NFR BLD AUTO: 72 %
NEUTROPHILS NFR BLD AUTO: 75 %
NEUTROPHILS NFR BLD AUTO: 79 %
NEUTROPHILS NFR BLD AUTO: 83 %
PLATELET # BLD AUTO: 211 K/UL (ref 150–450)
PLATELET # BLD AUTO: 214 K/UL (ref 150–450)
PLATELET # BLD AUTO: 233 K/UL (ref 150–450)
PLATELET # BLD AUTO: 310 K/UL (ref 150–450)
POTASSIUM SERPL-SCNC: 3.8 MMOL/L (ref 3.5–5.1)
PROT SERPL-MCNC: 6.1 G/DL (ref 6.3–8.2)
SODIUM SERPL-SCNC: 144 MMOL/L (ref 137–145)
WBC # BLD AUTO: 11.5 K/UL (ref 3.8–10.6)
WBC # BLD AUTO: 14.9 K/UL (ref 3.8–10.6)
WBC # BLD AUTO: 18 K/UL (ref 3.8–10.6)
WBC # BLD AUTO: 9.9 K/UL (ref 3.8–10.6)

## 2018-09-14 PROCEDURE — 0DB98ZX EXCISION OF DUODENUM, VIA NATURAL OR ARTIFICIAL OPENING ENDOSCOPIC, DIAGNOSTIC: ICD-10-PCS

## 2018-09-14 PROCEDURE — 0DB78ZX EXCISION OF STOMACH, PYLORUS, VIA NATURAL OR ARTIFICIAL OPENING ENDOSCOPIC, DIAGNOSTIC: ICD-10-PCS

## 2018-09-14 PROCEDURE — 0DB68ZZ EXCISION OF STOMACH, VIA NATURAL OR ARTIFICIAL OPENING ENDOSCOPIC: ICD-10-PCS

## 2018-09-14 RX ADMIN — CEFAZOLIN SCH: 330 INJECTION, POWDER, FOR SOLUTION INTRAMUSCULAR; INTRAVENOUS at 20:05

## 2018-09-14 RX ADMIN — ACETAMINOPHEN PRN MG: 325 TABLET, FILM COATED ORAL at 09:49

## 2018-09-14 RX ADMIN — SODIUM CHLORIDE SCH MLS/HR: 9 INJECTION, SOLUTION INTRAVENOUS at 10:00

## 2018-09-14 RX ADMIN — DEXTROSE MONOHYDRATE SCH MLS/HR: 5 INJECTION, SOLUTION INTRAVENOUS at 06:09

## 2018-09-14 RX ADMIN — ATORVASTATIN CALCIUM SCH MG: 40 TABLET, FILM COATED ORAL at 19:57

## 2018-09-14 RX ADMIN — PANTOPRAZOLE SODIUM SCH MG: 40 INJECTION, POWDER, FOR SOLUTION INTRAVENOUS at 19:58

## 2018-09-14 RX ADMIN — DEXTROSE MONOHYDRATE SCH MLS/HR: 5 INJECTION, SOLUTION INTRAVENOUS at 12:12

## 2018-09-14 RX ADMIN — DEXTROSE MONOHYDRATE SCH MLS/HR: 5 INJECTION, SOLUTION INTRAVENOUS at 19:57

## 2018-09-14 RX ADMIN — CEFAZOLIN SCH MLS/HR: 330 INJECTION, POWDER, FOR SOLUTION INTRAMUSCULAR; INTRAVENOUS at 07:56

## 2018-09-14 RX ADMIN — PANTOPRAZOLE SODIUM SCH MG: 40 INJECTION, POWDER, FOR SOLUTION INTRAVENOUS at 07:56

## 2018-09-14 NOTE — P.PCN
Date of Procedure: 09/14/18


Description of Procedure: 


BRIEF HISTORY: Patient is a 78-year-old, pleasant, male patient who is 

currently admitted after coming in with complaints of weakness and having CT of 

the abdomen show diverticulitis with possible phlegmon/abscess formation.  The 

patient reported dark stool on presentation and subsequently had a fall in his 

hemoglobin to 8.5.  He denies any prior history of peptic ulcer disease or 

abdominal pain. 





PROCEDURE PERFORMED: Esophagogastroduodenoscopy with cold biopsy.





PREOPERATIVE DIAGNOSIS: Melena, anemia of acute blood loss. 





ESTIMATED BLOOD LOSS: Minimal.





IV sedation per anesthesia. 





PROCEDURE: After informed consent was obtained, the patient  was brought into 

the endoscopy unit. IV sedation was administered by Anesthesia under continuous 

monitoring. Initially the Olympus GIF-190 video endoscope was inserted into the 

mouth. Esophagus intubated without any difficulty. It was gradually advanced 

into the stomach and duodenum and carefully examined. The bulb and the second 

part of the duodenum showed evidence of mild erythema and inflammation with 

biopsies taken. The scope at this time was withdrawn to the stomach, adequately 

insufflated with air, and upon careful examination, mucosa a small nonbleeding 

antral polyp measuring 6 mm was seen and removed completely with cold snare.  

Mild inflammation and erythema of the antrum was also noted and biopsied. The 

scope was then withdrawn into the esophagus. The GE junction was located at 40 

cm from the incisors. The esophagus appeared normal. 





IMPRESSION: 


1.  No active bleeding visualized.


2.  Small nonbleeding 6 mm antral polyp, removed with cold snare.


3.  Mild antral gastritis, biopsied.


4.  Mild duodenitis, biopsied.





RECOMMENDATIONS: The findings of this examination were discussed with the 

patient.  Okay to start full liquid diet.  Continue PPI therapy.  Monitor 

hemoglobin and transfuse as needed.  Await pathology.  Plan on colonoscopy with 

date to be determined.

## 2018-09-14 NOTE — P.PN
Subjective


Progress Note Date: 09/14/18


This is a 78-year-old male patient of Dr. Kinney.  Patient presented to the 

hospital with feeling of increased weakness and dizziness.  Upon arrival to 

emergency room patient had 2 large bloody bowel movements.  Patient has known 

past medical history of CVA and hypertension.  Patient was given 1 unit of 

blood in the emergency room.  Patient was on Augmentin prior to hospitalization 

for dental work.  CT a of the head and neck completed showing no significant 

acute abnormality.  No significant change from recent CT neck study.  Chest x-

ray completed showing chronic changes and cardiomegaly without acute pulmonary 

process.  CT of head completed showing no acute intracranial hemorrhage or 

midline shift.  There is moderate diffuse age-related cerebral atrophy and 

chronic small vessel ischemic changes redemonstrated no significant change from 

recent CT.  CT of abdomen and pelvis completed showing compensated acute 

sigmoid diverticulitis, with a 8 x 7 x 5 cm Phlegmon juxtaposed between the 

sigmoid in the urinary bladder.  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 

consolidative obesity.  If no prior CTs available to ensure stability over time 

the instruments follow-up chest CT is recommended.  Dr. Larson per critical 

care and pulmonary services consulted.  Dr. Barrios per surgical service is 

consulted.  WBC upon arrival 20.1.  Patient currently on Zosyn for IV 

antibiotics.  Hemoglobin 10.7.  Patient did receive 1 unit of PRBCs.  Urine and 

blood cultures have been ordered.  At this time patient denies chest pain or 

shortness of breath.  Patient denies nausea vomiting or diarrhea.  Patient 

denies any urinary burning or frequency.  Abdominal mass felt in lower left 

quadrant.  Discussed case with Dr. Larson per critical care.  Dr. King per GI 

services have been consulted. Carcinoembryonic antigen ordered per Critical 

care.





On 09/13/2018 patient has been moved out of the intensive care unit.  Patient 

is currently alert and oriented 3 resting comfortably in bed denies any 

complaints at this time.  Patient does state he has had a bowel movement that 

appeared dark this a.m.  Patient denies any nausea or vomiting.  Patient denies 

chest pain or shortness of breath.  Denies any urinary burning or frequency.  

Patient is currently on a clear liquid diet per surgical services





On 09/14/2018 per nursing Patient had episode of tarry stool and hemoglobin 

dropped to 7.3.  Patient was symptomatic of low hemoglobin.  Patient received 1 

unit of PRBCs.  Per nursing staff patient will have EGD today.  White blood 

cell increasing to 18.0.  Dr. Santana for ID is following.  Patient maintained 

on vancomycin and Zosyn.








Objective





- Vital Signs


Vital signs: 


 Vital Signs











Temp  98.6 F   09/14/18 08:03


 


Pulse  86   09/14/18 12:00


 


Resp  16   09/14/18 11:12


 


BP  131/83   09/14/18 11:12


 


Pulse Ox  97   09/14/18 11:12








 Intake & Output











 09/13/18 09/14/18 09/14/18





 18:59 06:59 18:59


 


Intake Total 2570 1310 310


 


Output Total 900  


 


Balance 1670 1310 310


 


Weight  110.4 kg 110.4 kg


 


Intake:   


 


  IV 1200 600 


 


    Sodium Chloride 0.9% 1, 1200 600 





    000 ml @ 100 mls/hr IV .   





    Q10H [facility] Rx#:[id number]   


 


  Intake, IV Titration 550 50 





  Amount   


 


    Piperacillin-Tazobactam 3 50 50 





    .375 gm In Dextrose/Water   





    1 50ml.bag @ 12.5 mls/hr   





    IVPB Q8H [facility] Rx#:   





    [id number]   


 


    Vancomycin 1,750 mg In 500  





    Sodium Chloride 0.9% 500   





    ml @ 167 mls/hr IVPB BID@   





    0600,1800 [facility] Rx#:   





    [id number]   


 


  Oral 820 660 


 


  Blood Product  0 310


 


    Rc As-1  Unit  0 310





    [id number]   


 


Output:   


 


  Urine 900  


 


Other:   


 


  Voiding Method Toilet Toilet Toilet





 Urinal Urinal Urinal


 


  # Voids 1  1


 


  # Bowel Movements 1  














- Exam


Head normocephalic


Neck supple


Lungs clear to auscultation bilaterally no wheezing or crackles


Heart regular rate and rhythm S1-S2, no rub or gallop


Abdomen is soft nontender nondistended positive bowel sounds no 

hepatosplenomegaly.  Left lower quadrant abdominal mass


Extremities no edema


Neuro alert and orientated to 








- Labs


CBC & Chem 7: 


 09/14/18 08:05





 09/14/18 08:05


Labs: 


 Abnormal Lab Results - Last 24 Hours (Table)











  09/11/18 09/13/18 09/13/18 Range/Units





  14:52 17:00 23:49 


 


WBC   16.3 H  14.9 H  (3.8-10.6)  k/uL


 


RBC   2.82 L  2.36 L  (4.30-5.90)  m/uL


 


Hgb   8.5 L  7.3 L  (13.0-17.5)  gm/dL


 


Hct   26.4 L  22.7 L  (39.0-53.0)  %


 


Neutrophils #   14.3 H  12.4 H  (1.3-7.7)  k/uL


 


Lymphocytes #   0.8 L   (1.0-4.8)  k/uL


 


Chloride     ()  mmol/L


 


Carbon Dioxide     (22-30)  mmol/L


 


Creatinine     (0.66-1.25)  mg/dL


 


Total Protein     (6.3-8.2)  g/dL


 


Albumin     (3.5-5.0)  g/dL


 


Crossmatch  See Detail    














  09/14/18 09/14/18 Range/Units





  08:05 08:05 


 


WBC  18.0 H   (3.8-10.6)  k/uL


 


RBC  3.15 L   (4.30-5.90)  m/uL


 


Hgb  9.4 L D   (13.0-17.5)  gm/dL


 


Hct  29.8 L   (39.0-53.0)  %


 


Neutrophils #  14.2 H   (1.3-7.7)  k/uL


 


Lymphocytes #    (1.0-4.8)  k/uL


 


Chloride   115 H  ()  mmol/L


 


Carbon Dioxide   20 L  (22-30)  mmol/L


 


Creatinine   1.46 H  (0.66-1.25)  mg/dL


 


Total Protein   6.1 L  (6.3-8.2)  g/dL


 


Albumin   2.7 L  (3.5-5.0)  g/dL


 


Crossmatch    








 Microbiology - Last 24 Hours (Table)











 09/11/18 17:52 Blood Culture Gram Stain - Preliminary





 Blood Blood Culture - Preliminary





    Coagulase Negative Staph














Assessment and Plan


Assessment: 


1 GI bleed secondary to acute sigmoid diverticulitis.  CT of the abdomen and 

pelvis completed showing complicated acute sigmoid diverticulitis with a 8 x 7 

x 5 CM Phlegmon juxtaposed between the sigmoid and the urinary bladder.  Dr. Barrios per surgical services consulted.  Patient currently nothing by mouth.  

Surgical services have been consulted.  Hemoglobin 10.7 patient did receive 1 

unit of PRBCs in emergency room.  Hemoglobin 9.3.  Per surgical services will 

treat patient conservatively now with IV antibiotic.  Patient had episode of 

tarry stools last night.  Patient received 1 unit of PRBCs for hemoglobin 7.3.  

Patient will have EGD today per GI services.  Patient remains on Vanco and 

Zosyn.  White blood cell increasing to 18.0





2. masslike collection within the abdomen measuring  8 x 7 x 5 cm in size in 

between the sigmoid and the urinary bladder.  GI services have been consulted 

per critical care and CEA ordered.  CEA level 1. per GI services patient will 

benefit from interventional radiology percutaneous drainage but will refer to 

general surgical services at this time.  Discussed case in depth labeled with 

GI service is planning for EGD and colonoscopy outpatient however timing will 

be decided on clinical course ideally would like to wait until chronic Phlegmon 

has improved with resolution of leukocytosis and fever.  No surgical 

intervention planned at this time per surgical services.  EGD planned today per 

GI services





3. patchy opacity left lower lobe.  CTA completed showing left lower lobe 5 x 5 

x 3 cm pulmonary consolidative opacity.  Dr. Larson following for pulmonary 

services.





4. previous history of CVA





5. leukocytosis.  Patient did have elevated temperature on arrival to emergency 

room. Blood and urine cultures have been ordered.  Patient currently on IV 

Zosyn and vancomycin for antibiotics.  Dr. Santana per infectious disease has 

been consulted





6. syncope.  CT of head completed showing no acute intracranial hemorrhage or 

midline shift.  There is moderate diffuse age-related cerebral atrophy and 

chronic small vessel ischemic change redemonstrated.  No significant change 

from recent CT





7.  Recent dental work.  Patient was previously taking Augmentin at home.  CTA 

completed showing no significant acute abnormality.  No significant change from 

recent CT of neck study.





8.  Acute kidney injury.  Creatinine increasing to 1.46.  Normal Saline at 100.

  Continue to Monitor closely





GI prophylaxis Protonix





I performed an examination of the patient and discussed their management with 

the Nurse Practitioner.  I have reviewed the Nurse Practitioner's notes and 

agree with the documented findings and plan of care

## 2018-09-14 NOTE — P.PN
Subjective


Progress Note Date: 09/14/18





78-year-old male seen sitting up in bed is scheduled today for an EGD by GI 

service patient reports no bowel movements denies abdominal pain reports of 

nausea vomiting





Patients being followed by surgical service for Acute sigmoid diverticulitis 

with  phlegom noted  in the sigmoid





Objective





- Vital Signs


Vital signs: 


 Vital Signs











Temp  98.6 F   09/14/18 08:03


 


Pulse  86   09/14/18 11:12


 


Resp  16   09/14/18 11:12


 


BP  131/83   09/14/18 11:12


 


Pulse Ox  97   09/14/18 11:12








 Intake & Output











 09/13/18 09/14/18 09/14/18





 18:59 06:59 18:59


 


Intake Total 2570 1310 310


 


Output Total 900  


 


Balance 1670 1310 310


 


Weight  110.4 kg 110.4 kg


 


Intake:   


 


  IV 1200 600 


 


    Sodium Chloride 0.9% 1, 1200 600 





    000 ml @ 100 mls/hr IV .   





    Q10H [facility] Rx#:[id number]   


 


  Intake, IV Titration 550 50 





  Amount   


 


    Piperacillin-Tazobactam 3 50 50 





    .375 gm In Dextrose/Water   





    1 50ml.bag @ 12.5 mls/hr   





    IVPB Q8H [facility] Rx#:   





    [id number]   


 


    Vancomycin 1,750 mg In 500  





    Sodium Chloride 0.9% 500   





    ml @ 167 mls/hr IVPB BID@   





    0600,1800 [facility] Rx#:   





    [id number]   


 


  Oral 820 660 


 


  Blood Product  0 310


 


    Rc As-1  Unit  0 310





    [id number]   


 


Output:   


 


  Urine 900  


 


Other:   


 


  Voiding Method Toilet Toilet Toilet





 Urinal Urinal Urinal


 


  # Voids 1  1


 


  # Bowel Movements 1  














- Exam





Physical exam


Abdomen obese soft denying any abdominal pain no nausea no vomiting states no 

stool no difficulty in urinating reports tolerating diet





- Labs


CBC & Chem 7: 


 09/14/18 08:05





 09/14/18 08:05


Labs: 


 Abnormal Lab Results - Last 24 Hours (Table)











  09/11/18 09/13/18 09/13/18 Range/Units





  14:52 17:00 23:49 


 


WBC   16.3 H  14.9 H  (3.8-10.6)  k/uL


 


RBC   2.82 L  2.36 L  (4.30-5.90)  m/uL


 


Hgb   8.5 L  7.3 L  (13.0-17.5)  gm/dL


 


Hct   26.4 L  22.7 L  (39.0-53.0)  %


 


Neutrophils #   14.3 H  12.4 H  (1.3-7.7)  k/uL


 


Lymphocytes #   0.8 L   (1.0-4.8)  k/uL


 


Chloride     ()  mmol/L


 


Carbon Dioxide     (22-30)  mmol/L


 


Creatinine     (0.66-1.25)  mg/dL


 


Total Protein     (6.3-8.2)  g/dL


 


Albumin     (3.5-5.0)  g/dL


 


Crossmatch  See Detail    














  09/14/18 09/14/18 Range/Units





  08:05 08:05 


 


WBC  18.0 H   (3.8-10.6)  k/uL


 


RBC  3.15 L   (4.30-5.90)  m/uL


 


Hgb  9.4 L D   (13.0-17.5)  gm/dL


 


Hct  29.8 L   (39.0-53.0)  %


 


Neutrophils #  14.2 H   (1.3-7.7)  k/uL


 


Lymphocytes #    (1.0-4.8)  k/uL


 


Chloride   115 H  ()  mmol/L


 


Carbon Dioxide   20 L  (22-30)  mmol/L


 


Creatinine   1.46 H  (0.66-1.25)  mg/dL


 


Total Protein   6.1 L  (6.3-8.2)  g/dL


 


Albumin   2.7 L  (3.5-5.0)  g/dL


 


Crossmatch    








 Microbiology - Last 24 Hours (Table)











 09/11/18 17:52 Blood Culture Gram Stain - Preliminary





 Blood Blood Culture - Preliminary





    Coagulase Negative Staph














Assessment and Plan


Assessment: 





Impression


Present on admission 2 large bloody stools suspect GI bleed due to acute 

sigmoid diverticulitis


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


Leukocytosis febrile present on admission


Masslike collection per computed tomography scan of the abdomen between sigmoid 

and urinary bladder noted


Patchy opacity left lower lobe


Computed tomography scan abdomen and pelvis show complicated acute sigmoid 

diverticulitis with [id number] phlegmon to the post between the sigmoid and the 

urinary bladder


Present on admission 2 large bloody stools 1 unit packed red blood cells given 

in the emergency room suspect GI bleed


Persistent fever with preliminary blood culture gram-positive cocci in clusters


Component of acute blood loss anemia








Plan


Await findings from EGD scheduled today


Will follow with you closely addressing surgical issues as they arise


IV Zosyn and vancomycin as ordered per infectious disease


DVT and GI prophylaxis


Patient would benefit from an  colonoscopy when clinically stable in the 

outpatient setting this can be arranged not plan this admission

















The above impression and plan of care have been discussed and directed by 

signing physician. Samira Schaeffer nurse practitioner acting as scribe for signing 

physician.

## 2018-09-15 LAB
ALBUMIN SERPL-MCNC: 2 G/DL (ref 3.5–5)
ALP SERPL-CCNC: 72 U/L (ref 38–126)
ALT SERPL-CCNC: 48 U/L (ref 21–72)
ANION GAP SERPL CALC-SCNC: 2 MMOL/L
APTT BLD: 20 SEC (ref 22–30)
AST SERPL-CCNC: 45 U/L (ref 17–59)
BASOPHILS # BLD AUTO: 0 K/UL (ref 0–0.2)
BASOPHILS # BLD AUTO: 0.1 K/UL (ref 0–0.2)
BASOPHILS NFR BLD AUTO: 0 %
BUN SERPL-SCNC: 18 MG/DL (ref 9–20)
CALCIUM SPEC-MCNC: 7.9 MG/DL (ref 8.4–10.2)
CHLORIDE SERPL-SCNC: 120 MMOL/L (ref 98–107)
CO2 SERPL-SCNC: 21 MMOL/L (ref 22–30)
EOSINOPHIL # BLD AUTO: 0.2 K/UL (ref 0–0.7)
EOSINOPHIL # BLD AUTO: 0.4 K/UL (ref 0–0.7)
EOSINOPHIL # BLD AUTO: 0.4 K/UL (ref 0–0.7)
EOSINOPHIL # BLD AUTO: 0.5 K/UL (ref 0–0.7)
EOSINOPHIL NFR BLD AUTO: 1 %
EOSINOPHIL NFR BLD AUTO: 3 %
EOSINOPHIL NFR BLD AUTO: 3 %
EOSINOPHIL NFR BLD AUTO: 4 %
ERYTHROCYTE [DISTWIDTH] IN BLOOD BY AUTOMATED COUNT: 2.35 M/UL (ref 4.3–5.9)
ERYTHROCYTE [DISTWIDTH] IN BLOOD BY AUTOMATED COUNT: 2.42 M/UL (ref 4.3–5.9)
ERYTHROCYTE [DISTWIDTH] IN BLOOD BY AUTOMATED COUNT: 2.44 M/UL (ref 4.3–5.9)
ERYTHROCYTE [DISTWIDTH] IN BLOOD BY AUTOMATED COUNT: 2.47 M/UL (ref 4.3–5.9)
ERYTHROCYTE [DISTWIDTH] IN BLOOD: 13.8 % (ref 11.5–15.5)
ERYTHROCYTE [DISTWIDTH] IN BLOOD: 14.2 % (ref 11.5–15.5)
ERYTHROCYTE [DISTWIDTH] IN BLOOD: 14.6 % (ref 11.5–15.5)
ERYTHROCYTE [DISTWIDTH] IN BLOOD: 15.1 % (ref 11.5–15.5)
GLUCOSE BLD-MCNC: 118 MG/DL (ref 75–99)
GLUCOSE BLD-MCNC: 141 MG/DL (ref 75–99)
GLUCOSE SERPL-MCNC: 150 MG/DL (ref 74–99)
HCT VFR BLD AUTO: 21.8 % (ref 39–53)
HCT VFR BLD AUTO: 22.3 % (ref 39–53)
HCT VFR BLD AUTO: 22.9 % (ref 39–53)
HCT VFR BLD AUTO: 23.3 % (ref 39–53)
HGB BLD-MCNC: 7.2 GM/DL (ref 13–17.5)
HGB BLD-MCNC: 7.4 GM/DL (ref 13–17.5)
HGB BLD-MCNC: 7.4 GM/DL (ref 13–17.5)
HGB BLD-MCNC: 7.5 GM/DL (ref 13–17.5)
INR PPP: 1.1 (ref ?–1.2)
LYMPHOCYTES # SPEC AUTO: 1.2 K/UL (ref 1–4.8)
LYMPHOCYTES # SPEC AUTO: 1.7 K/UL (ref 1–4.8)
LYMPHOCYTES # SPEC AUTO: 1.9 K/UL (ref 1–4.8)
LYMPHOCYTES # SPEC AUTO: 3.1 K/UL (ref 1–4.8)
LYMPHOCYTES NFR SPEC AUTO: 14 %
LYMPHOCYTES NFR SPEC AUTO: 14 %
LYMPHOCYTES NFR SPEC AUTO: 17 %
LYMPHOCYTES NFR SPEC AUTO: 8 %
MAGNESIUM SPEC-SCNC: 1.9 MG/DL (ref 1.6–2.3)
MCH RBC QN AUTO: 30.1 PG (ref 25–35)
MCH RBC QN AUTO: 30.2 PG (ref 25–35)
MCH RBC QN AUTO: 30.6 PG (ref 25–35)
MCH RBC QN AUTO: 30.7 PG (ref 25–35)
MCHC RBC AUTO-ENTMCNC: 31.6 G/DL (ref 31–37)
MCHC RBC AUTO-ENTMCNC: 32.6 G/DL (ref 31–37)
MCHC RBC AUTO-ENTMCNC: 33.1 G/DL (ref 31–37)
MCHC RBC AUTO-ENTMCNC: 33.3 G/DL (ref 31–37)
MCV RBC AUTO: 92.1 FL (ref 80–100)
MCV RBC AUTO: 92.6 FL (ref 80–100)
MCV RBC AUTO: 92.6 FL (ref 80–100)
MCV RBC AUTO: 95.3 FL (ref 80–100)
MONOCYTES # BLD AUTO: 0.6 K/UL (ref 0–1)
MONOCYTES # BLD AUTO: 0.6 K/UL (ref 0–1)
MONOCYTES # BLD AUTO: 0.7 K/UL (ref 0–1)
MONOCYTES # BLD AUTO: 1.1 K/UL (ref 0–1)
MONOCYTES NFR BLD AUTO: 4 %
MONOCYTES NFR BLD AUTO: 4 %
MONOCYTES NFR BLD AUTO: 6 %
MONOCYTES NFR BLD AUTO: 6 %
NEUTROPHILS # BLD AUTO: 10.9 K/UL (ref 1.3–7.7)
NEUTROPHILS # BLD AUTO: 13.4 K/UL (ref 1.3–7.7)
NEUTROPHILS # BLD AUTO: 13.4 K/UL (ref 1.3–7.7)
NEUTROPHILS # BLD AUTO: 8.8 K/UL (ref 1.3–7.7)
NEUTROPHILS NFR BLD AUTO: 73 %
NEUTROPHILS NFR BLD AUTO: 74 %
NEUTROPHILS NFR BLD AUTO: 77 %
NEUTROPHILS NFR BLD AUTO: 86 %
PLATELET # BLD AUTO: 217 K/UL (ref 150–450)
PLATELET # BLD AUTO: 219 K/UL (ref 150–450)
PLATELET # BLD AUTO: 258 K/UL (ref 150–450)
PLATELET # BLD AUTO: 382 K/UL (ref 150–450)
POTASSIUM SERPL-SCNC: 4.3 MMOL/L (ref 3.5–5.1)
PROT SERPL-MCNC: 4.7 G/DL (ref 6.3–8.2)
PT BLD: 11.1 SEC (ref 9–12)
SODIUM SERPL-SCNC: 143 MMOL/L (ref 137–145)
WBC # BLD AUTO: 11.8 K/UL (ref 3.8–10.6)
WBC # BLD AUTO: 14.1 K/UL (ref 3.8–10.6)
WBC # BLD AUTO: 15.5 K/UL (ref 3.8–10.6)
WBC # BLD AUTO: 18.4 K/UL (ref 3.8–10.6)

## 2018-09-15 RX ADMIN — PANTOPRAZOLE SODIUM SCH MG: 40 INJECTION, POWDER, FOR SOLUTION INTRAVENOUS at 08:54

## 2018-09-15 RX ADMIN — DEXTROSE MONOHYDRATE SCH MLS/HR: 5 INJECTION, SOLUTION INTRAVENOUS at 20:19

## 2018-09-15 RX ADMIN — DEXTROSE MONOHYDRATE SCH MLS/HR: 5 INJECTION, SOLUTION INTRAVENOUS at 12:24

## 2018-09-15 RX ADMIN — SODIUM CHLORIDE SCH MLS/HR: 9 INJECTION, SOLUTION INTRAVENOUS at 02:45

## 2018-09-15 RX ADMIN — CEFAZOLIN SCH MLS/HR: 330 INJECTION, POWDER, FOR SOLUTION INTRAMUSCULAR; INTRAVENOUS at 04:19

## 2018-09-15 RX ADMIN — MAGNESIUM SULFATE IN DEXTROSE SCH MLS/HR: 10 INJECTION, SOLUTION INTRAVENOUS at 18:07

## 2018-09-15 RX ADMIN — PANTOPRAZOLE SODIUM SCH MG: 40 INJECTION, POWDER, FOR SOLUTION INTRAVENOUS at 20:18

## 2018-09-15 RX ADMIN — CEFAZOLIN SCH MLS/HR: 330 INJECTION, POWDER, FOR SOLUTION INTRAMUSCULAR; INTRAVENOUS at 12:24

## 2018-09-15 RX ADMIN — ATORVASTATIN CALCIUM SCH MG: 40 TABLET, FILM COATED ORAL at 20:18

## 2018-09-15 RX ADMIN — MAGNESIUM SULFATE IN DEXTROSE SCH MLS/HR: 10 INJECTION, SOLUTION INTRAVENOUS at 15:45

## 2018-09-15 RX ADMIN — DEXTROSE MONOHYDRATE SCH MLS/HR: 5 INJECTION, SOLUTION INTRAVENOUS at 05:21

## 2018-09-15 RX ADMIN — CEFAZOLIN SCH MLS/HR: 330 INJECTION, POWDER, FOR SOLUTION INTRAMUSCULAR; INTRAVENOUS at 22:40

## 2018-09-15 NOTE — P.PN
Subjective


Progress Note Date: 09/15/18


Principal diagnosis: 





GI bleed





78-year-old male patient, previous history of CVA, came into the hospital 

yesterday because of generalized weakness.  He was feeling dizzy and had a bout 

of syncope in the emergency department.  He immediately recovered.  No cardiac 

arrhythmias noted.  No chest pain.  His neurologic exam was nonfocal.  

Subsequently was found to have a low-grade fever.  At the later stage he 

developed a GI bleed and based on the emergency staff the patient had a bright 

red blood per rectum.  Because of the significant amount of blood that was noted

, the patient was given a unit of packed RBC.  Hemoglobin this morning is down 

to 10.7 from a baseline of 12.8.  He did have a low-grade fever in the 

emergency department with a white cell count of 20.1.  CAT scan of the abdomen 

was done and it showed sigmoid diverticulosis and an area of an abnormal 

thickening of the bowel which is ill-defined measuring around 8 x 7 x 4 cm in 

size anterior to the midline possibly a phlegmon versus a mass juxtaposed 

between the sigmoid and the urinary bladder.  The patient does not have any 

signs or symptoms of UTI.  No fecal material passing gas through his penis.  UA 

is negative.  He is hemodynamically stable.  He was placed on IV Zosyn.  White 

cell count is improving.  I examination today he has a firm infraumbilical mass 

that can be easily palpated.  No direct tenderness.  No rebound tensile 

guarding.  Cultures of been all negative thus far.  His EKG is normal sinus and 

the patient doesn't have any arrhythmias noted.  CAT scan of the brain and the 

CAT scan of the soft tissue of the neck were both negative.  No history of any 

constipation.  No history of any change in bowel habits.  The patient has not 

had a colonoscopy in the past.





The patient is seen again today 09/13/2018 in follow-up on the selective care 

unit.  He is currently resting quite comfortably in bed.  He denies any further 

abdominal discomfort.  No nausea or vomiting.  He did have a dark bowel 

movement this morning.  No noted bright red bleeding.  He denies any shortness 

of breath, cough or congestion.  No chest pain.  He is maintaining good O2 

saturations in the upper 90s on room air.  He's been afebrile.  Hemodynamically 

stable.  White count 13.9.  Hemoglobin 9.3.  Creatinine 0.82.  He remains on IV 

Protonix.  The plan is for probable EGD/colonoscopy in the outpatient setting.





The patient is seen again today 09/15/2018 in follow-up in the intensive care 

unit.  We'll reconsult for critical care management.  The patient did have a 

large bloody bowel movement approximately 2:00 this morning.  He developed 

significant hypotension and A team was called and he was transferred to the 

intensive care unit.  He did receive 2 units of packed red blood cells and 1 L 

of fluid.  He did undergo an EGD yesterday and was found to have no active 

bleeding.  There is a small nonbleeding 6 mm 8 antral polyp that was removed.  

Mild antral gastritis and mild duodenitis.  Surgical services were consulted 

and the plan is to continue with antibiotics for the acute diverticulitis and 

blood replacement if necessary. He is currently awake and alert in no acute 

distress.  He is maintaining good O2 saturation in the 90s on room air.  He 

denies any chest pain.  No abdominal discomfort whatsoever.  No palpable mass.  

He has 0.9 normal saline at 100 ML's per hour.  He did not require any pressor 

support.  Last hemoglobin 7.4.  Follow-up labs are pending.





Objective





- Vital Signs


Vital signs: 


 Vital Signs











Temp  98.0 F   09/15/18 10:03


 


Pulse  81   09/15/18 10:03


 


Resp  16   09/15/18 10:03


 


BP  131/70   09/15/18 10:03


 


Pulse Ox  97   09/15/18 09:00








 Intake & Output











 09/14/18 09/15/18 09/15/18





 18:59 06:59 18:59


 


Intake Total 890 1770 685


 


Output Total   1


 


Balance 890 1770 684


 


Weight 110.4 kg 112.4 kg 


 


Intake:   


 


  IV  1150 300


 


    0.9 Bolus  1000 


 


    Piperacillin-Tazobactam 3  50 





    .375 gm In Dextrose/Water   





    1 50ml.bag @ 12.5 mls/hr   





    IVPB ONCE STA Rx#:   





    [id number]   


 


    Sodium Chloride 0.9% 1,  100 300





    000 ml @ 100 mls/hr IV .   





    Q10H [facility] Rx#:[id number]   


 


  Oral 580  


 


  Blood Product 310 620 385


 


    Rc As-1  Unit  310 





    [id number]   


 


    Rc As-1  Unit 310  





    [id number]   


 


    Rc As-1  Unit  0 310





    [id number]   


 


Output:   


 


  Urine   0


 


  Stool   1


 


Other:   


 


  Voiding Method Toilet Urinal Urinal





 Urinal  


 


  # Voids 1  1














- Exam





Gen. appearance, comfortable likely distress


Head exam was generally normal. There was no scleral icterus or corneal arcus. 

Mucous membranes were moist.


Neck was supple and without jugular venous distension, thyromegaly, or carotid 

bruits. Carotids were easily palpable bilaterally. There was no adenopathy.


Lungs were clear to auscultation and percussion, and with normal diaphragmatic 

excursion. No wheezes or rales were noted. 


Cardiac exam revealed the PMI to be normally situated and sized. The rhythm was 

regular and no extrasystoles were noted during several minutes of auscultation. 

The first and second heart sounds were normal and physiologic splitting of the 

second heart sound was noted. There were no murmurs, rubs, clicks, or gallops.


Abdomen is soft and nontender.  Bowel sounds are present.


Examination of the extremities revealed easily palpable radial, femoral and 

pedal pulses. There was no cyanosis, clubbing or edema.


Examination of the skin revealed no evidence of significant rashes, suspicious 

appearing nevi or other concerning lesions.


Neurologically awake and alert and is no focal neurological deficit.





- Labs


CBC & Chem 7: 


 09/15/18 01:31





 09/14/18 08:05


Labs: 


 Abnormal Lab Results - Last 24 Hours (Table)











  09/11/18 09/14/18 09/14/18 Range/Units





  14:52 14:57 21:24 


 


WBC   11.5 H   (3.8-10.6)  k/uL


 


RBC   2.59 L  2.32 L  (4.30-5.90)  m/uL


 


Hgb   8.1 L  7.3 L  (13.0-17.5)  gm/dL


 


Hct   24.2 L  21.5 L  (39.0-53.0)  %


 


Neutrophils #   8.6 H   (1.3-7.7)  k/uL


 


Monocytes #     (0-1.0)  k/uL


 


APTT     (22.0-30.0)  sec


 


POC Glucose (mg/dL)     (75-99)  mg/dL


 


Crossmatch  See Detail    














  09/15/18 09/15/18 09/15/18 Range/Units





  01:04 01:28 01:28 


 


WBC     (3.8-10.6)  k/uL


 


RBC     (4.30-5.90)  m/uL


 


Hgb     (13.0-17.5)  gm/dL


 


Hct     (39.0-53.0)  %


 


Neutrophils #     (1.3-7.7)  k/uL


 


Monocytes #     (0-1.0)  k/uL


 


APTT   20.0 L   (22.0-30.0)  sec


 


POC Glucose (mg/dL)  118 H    (75-99)  mg/dL


 


Crossmatch    See Detail  














  09/15/18 09/15/18 Range/Units





  01:31 01:37 


 


WBC  18.4 H   (3.8-10.6)  k/uL


 


RBC  2.44 L   (4.30-5.90)  m/uL


 


Hgb  7.4 L   (13.0-17.5)  gm/dL


 


Hct  23.3 L   (39.0-53.0)  %


 


Neutrophils #  13.4 H   (1.3-7.7)  k/uL


 


Monocytes #  1.1 H   (0-1.0)  k/uL


 


APTT    (22.0-30.0)  sec


 


POC Glucose (mg/dL)   141 H  (75-99)  mg/dL


 


Crossmatch    














Assessment and Plan


Assessment: 





Assessment





1 acute sigmoid diverticulitis with low-grade fever and leukocytosis currently 

on IV Zosyn.  Current white count 18.4.





2 masslike collection within the abdomen measuring  8 x 7 x 5 cm in size in 

between the sigmoid and the urinary bladder.  This could be an infected 

complicated diverticulitis segment of the bowel.  Underlying colon mass cannot 

be completely excluded knowing that the patient has had significant amount of 

GI bleed which is unusual for acute diverticulitis.  





On 09/15/2018 The patient had a recurrent bloody bowel movement and near 

syncopal episode with significant hypotension.  He is status post 2 units of 

packed red blood cells and 1 L of fluid.  He has not required any pressor 

support.





3 patchy opacity left lower lobe, couldn't examine underlying inflammatory 

pneumonia although clinically the patient does not show any signs or symptoms 

of pneumonia at this point in time





4 previous history of CVA





5 leukocytosis improving





6 syncope with a negative CAT scan of the head.





Plan





The patient was seen and evaluated by Dr. Walker.  The patient did have 

recurrent maroon colored bowel movements with near syncopal episode and 

hypotension and transferred back to the intensive care unit.  The plan is for 

continued IV antibiotics for the acute diverticulitis and blood replacement if 

necessary.  If there is no significant improvement in the next 24-48 hours he 

may require colectomy of the portion of the sigmoid that is involved.  We'll 

continue to monitor him here in the intensive care unit.  We'll continue to 

follow.





I, the cosigning physician, performed a history & physical examination of the 

patient. Lungs sounds are clear.  Maintaining good O2 saturations in the 90s on 

room air.  I discussed the assessment and plan of care with my nurse 

practitioner, Zeinab Castillo. I attest to the above note as dictated by her.

## 2018-09-15 NOTE — P.PN
Subjective


Progress Note Date: 09/15/18





This is a 78-year-old male patient of Dr. Kinney.  Patient presented to the 

hospital with feeling of increased weakness and dizziness.  Upon arrival to 

emergency room patient had 2 large bloody bowel movements.  Patient has known 

past medical history of CVA and hypertension.  Patient was given 1 unit of 

blood in the emergency room.  Patient was on Augmentin prior to hospitalization 

for dental work.  CT a of the head and neck completed showing no significant 

acute abnormality.  No significant change from recent CT neck study.  Chest x-

ray completed showing chronic changes and cardiomegaly without acute pulmonary 

process.  CT of head completed showing no acute intracranial hemorrhage or 

midline shift.  There is moderate diffuse age-related cerebral atrophy and 

chronic small vessel ischemic changes redemonstrated no significant change from 

recent CT.  CT of abdomen and pelvis completed showing compensated acute 

sigmoid diverticulitis, with a 8 x 7 x 5 cm Phlegmon juxtaposed between the 

sigmoid in the urinary bladder.  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 

consolidative obesity.  If no prior CTs available to ensure stability over time 

the instruments follow-up chest CT is recommended.  Dr. Larson per critical 

care and pulmonary services consulted.  Dr. Barrios per surgical service is 

consulted.  WBC upon arrival 20.1.  Patient currently on Zosyn for IV 

antibiotics.  Hemoglobin 10.7.  Patient did receive 1 unit of PRBCs.  Urine and 

blood cultures have been ordered.  At this time patient denies chest pain or 

shortness of breath.  Patient denies nausea vomiting or diarrhea.  Patient 

denies any urinary burning or frequency.  Abdominal mass felt in lower left 

quadrant.  Discussed case with Dr. Larson per critical care.  Dr. King per GI 

services have been consulted. Carcinoembryonic antigen ordered per Critical 

care.





On 09/13/2018 patient has been moved out of the intensive care unit.  Patient 

is currently alert and oriented 3 resting comfortably in bed denies any 

complaints at this time.  Patient does state he has had a bowel movement that 

appeared dark this a.m.  Patient denies any nausea or vomiting.  Patient denies 

chest pain or shortness of breath.  Denies any urinary burning or frequency.  

Patient is currently on a clear liquid diet per surgical services





On 09/14/2018 per nursing Patient had episode of tarry stool and hemoglobin 

dropped to 7.3.  Patient was symptomatic of low hemoglobin.  Patient received 1 

unit of PRBCs.  Per nursing staff patient will have EGD today.  White blood 

cell increasing to 18.0.  Dr. Santana for ID is following.  Patient maintained 

on vancomycin and Zosyn.





On 09/15/2018 patient is alert and oriented 3 in no apparent distress he 

denies any nausea or vomiting no abdominal pain at this time he had bloody 

bowel movements throughout the night and received 2 units of red blood cell 

transfusion he is feeling better since. He denies any chest pain or shortness 

of breath, no cough no nausea or vomiting and no urinary symptoms











Objective





- Vital Signs


Vital signs: 


 Vital Signs











Temp  97.7 F   09/15/18 12:00


 


Pulse  87   09/15/18 15:00


 


Resp  20   09/15/18 15:00


 


BP  117/62   09/15/18 15:00


 


Pulse Ox  95   09/15/18 15:49








 Intake & Output











 09/14/18 09/15/18 09/15/18





 18:59 06:59 18:59


 


Intake Total 890 1770 1520


 


Output Total   0


 


Balance 890 1770 1520


 


Weight 110.4 kg 112.4 kg 


 


Intake:   


 


  IV  1150 900


 


    0.9 Bolus  1000 


 


    Piperacillin-Tazobactam 3  50 





    .375 gm In Dextrose/Water   





    1 50ml.bag @ 12.5 mls/hr   





    IVPB ONCE STA Rx#:   





    [id number]   


 


    Sodium Chloride 0.9% 1,  100 900





    000 ml @ 100 mls/hr IV .   





    [facility]#:[id number]   


 


  Oral 580  


 


  Blood Product 310 620 620


 


    Rc As-1  Unit  310 





    [id number]   


 


    Rc As-1  Unit 310  





    [id number]   


 


    Rc As-1  Unit  0 310





    [id number]   


 


Output:   


 


  Urine   0


 


Other:   


 


  Voiding Method Toilet Urinal Urinal





 Urinal  


 


  # Voids 1  


 


  # Bowel Movements   1














- Exam





Head normocephalic


Neck supple


Lungs clear to auscultation bilaterally no wheezing or crackles


Heart regular rate and rhythm S1-S2, no rub or gallop


Abdomen is soft nontender nondistended positive bowel sounds no 

hepatosplenomegaly.  Left lower quadrant abdominal mass


Extremities no edema


Neuro alert and orientated to 








- Labs


CBC & Chem 7: 


 09/15/18 10:50





 09/15/18 10:50


Labs: 


 Abnormal Lab Results - Last 24 Hours (Table)











  09/11/18 09/14/18 09/15/18 Range/Units





  14:52 21:24 01:04 


 


WBC     (3.8-10.6)  k/uL


 


RBC   2.32 L   (4.30-5.90)  m/uL


 


Hgb   7.3 L   (13.0-17.5)  gm/dL


 


Hct   21.5 L   (39.0-53.0)  %


 


Neutrophils #     (1.3-7.7)  k/uL


 


Monocytes #     (0-1.0)  k/uL


 


APTT     (22.0-30.0)  sec


 


Chloride     ()  mmol/L


 


Carbon Dioxide     (22-30)  mmol/L


 


Creatinine     (0.66-1.25)  mg/dL


 


Glucose     (74-99)  mg/dL


 


POC Glucose (mg/dL)    118 H  (75-99)  mg/dL


 


Calcium     (8.4-10.2)  mg/dL


 


Phosphorus     (2.5-4.5)  mg/dL


 


Total Protein     (6.3-8.2)  g/dL


 


Albumin     (3.5-5.0)  g/dL


 


Crossmatch  See Detail    














  09/15/18 09/15/18 09/15/18 Range/Units





  01:28 01:28 01:31 


 


WBC    18.4 H  (3.8-10.6)  k/uL


 


RBC    2.44 L  (4.30-5.90)  m/uL


 


Hgb    7.4 L  (13.0-17.5)  gm/dL


 


Hct    23.3 L  (39.0-53.0)  %


 


Neutrophils #    13.4 H  (1.3-7.7)  k/uL


 


Monocytes #    1.1 H  (0-1.0)  k/uL


 


APTT  20.0 L    (22.0-30.0)  sec


 


Chloride     ()  mmol/L


 


Carbon Dioxide     (22-30)  mmol/L


 


Creatinine     (0.66-1.25)  mg/dL


 


Glucose     (74-99)  mg/dL


 


POC Glucose (mg/dL)     (75-99)  mg/dL


 


Calcium     (8.4-10.2)  mg/dL


 


Phosphorus     (2.5-4.5)  mg/dL


 


Total Protein     (6.3-8.2)  g/dL


 


Albumin     (3.5-5.0)  g/dL


 


Crossmatch   See Detail   














  09/15/18 09/15/18 09/15/18 Range/Units





  01:37 10:50 10:50 


 


WBC   15.5 H   (3.8-10.6)  k/uL


 


RBC   2.47 L   (4.30-5.90)  m/uL


 


Hgb   7.5 L   (13.0-17.5)  gm/dL


 


Hct   22.9 L   (39.0-53.0)  %


 


Neutrophils #   13.4 H   (1.3-7.7)  k/uL


 


Monocytes #     (0-1.0)  k/uL


 


APTT     (22.0-30.0)  sec


 


Chloride    120 H  ()  mmol/L


 


Carbon Dioxide    21 L  (22-30)  mmol/L


 


Creatinine    1.82 H  (0.66-1.25)  mg/dL


 


Glucose    150 H  (74-99)  mg/dL


 


POC Glucose (mg/dL)  141 H    (75-99)  mg/dL


 


Calcium    7.9 L  (8.4-10.2)  mg/dL


 


Phosphorus    2.3 L  (2.5-4.5)  mg/dL


 


Total Protein    4.7 L  (6.3-8.2)  g/dL


 


Albumin    2.0 L  (3.5-5.0)  g/dL


 


Crossmatch     








 Microbiology - Last 24 Hours (Table)











 09/11/18 17:52 Blood Culture Gram Stain - Final





 Blood Blood Culture - Final





    Coagulase Negative Staph














Assessment and Plan


Plan: 





1 GI bleed secondary to acute sigmoid diverticulitis.  CT of the abdomen and 

pelvis completed showing complicated acute sigmoid diverticulitis with a 8 x 7 

x 5 CM Phlegmon juxtaposed between the sigmoid and the urinary bladder.  Dr. Barrios per surgical services consulted.  Patient currently nothing by mouth.  

Surgical services have been consulted.  Hemoglobin 10.7 patient did receive 1 

unit of PRBCs in emergency room.  Hemoglobin 9.3.  Per surgical services will 

treat patient conservatively now with IV antibiotic.  Patient had episode of 

tarry stools last night.  Patient received 1 unit of PRBCs for hemoglobin 7.3.  

Patient will have EGD today per GI services.  Patient remains on Vanco and 

Zosyn.  White blood cell increasing to 18.0





2. masslike collection within the abdomen measuring  8 x 7 x 5 cm in size in 

between the sigmoid and the urinary bladder.  GI services have been consulted 

per critical care and CEA ordered.  CEA level 1. per GI services patient will 

benefit from interventional radiology percutaneous drainage but will refer to 

general surgical services at this time.  Discussed case in depth labeled with 

GI service is planning for EGD and colonoscopy outpatient however timing will 

be decided on clinical course ideally would like to wait until chronic Phlegmon 

has improved with resolution of leukocytosis and fever.  No surgical 

intervention planned at this time per surgical services.  EGD planned today per 

GI services





3. patchy opacity left lower lobe.  CTA completed showing left lower lobe 5 x 5 

x 3 cm pulmonary consolidative opacity.  Dr. Larson following for pulmonary 

services.





4. previous history of CVA





5. leukocytosis.  Patient did have elevated temperature on arrival to emergency 

room. Blood and urine cultures have been ordered.  Patient currently on IV 

Zosyn and vancomycin for antibiotics.  Dr. Santana per infectious disease has 

been consulted





6. syncope.  CT of head completed showing no acute intracranial hemorrhage or 

midline shift.  There is moderate diffuse age-related cerebral atrophy and 

chronic small vessel ischemic change redemonstrated.  No significant change 

from recent CT





7.  Recent dental work.  Patient was previously taking Augmentin at home.  CTA 

completed showing no significant acute abnormality.  No significant change from 

recent CT of neck study.





8.  Acute kidney injury.  Creatinine increasing to 1.46.  Normal Saline at 100.

  Continue to Monitor closely





GI prophylaxis Protonix

## 2018-09-15 NOTE — P.PN
Subjective


Progress Note Date: 09/15/18











CHIEF COMPLAINT: History of GI bleed





HISTORY OF PRESENT ILLNESS: The patient is a 78-year-old gentleman who was 

transferred by to the ICU from [facility]. He completed an upper endoscopy 

yesterday.  "I feel much better today.".  No further reports of moderate lower 

abdominal pain.  He had 2 units of blood. He is sitting up in chair. Overnight, 

patient did have bleeding from the rectum





PHYSICAL EXAM: 





GENERAL: Well-developed in no distress. 


HEENT: No scleral icterus.  Extraocular movements grossly intact.  Hears 

conversational speech.  No nasal drainage.


NECK: Supple without lymphadenopathy.  


CHEST: Nonlabored respirations with equal bilateral excursions. 


CARDIOVASCULAR: Regular rate and regular rhythm. Distal 2+ pulses. 


ABDOMEN: No peritonitis.  Soft.  Minimal tenderness lower abdomen


MUSCULOSKELETAL: No clubbing, cyanosis 


NEURO: No focal or lateralizing signs. Cranial nerves 2 through 12 grossly 

within normal limits. 


PSYCH: Appropriate affect. Alert and oriented to person, place and time.


SKIN: Good skin turgor.  Well perfused.





ASSESSMENT: 


1. GI bleed


2. History of pelvic abscess


3. Acute blood loss anemia from lower GI bleed.





PLAN:


1.  Continue NPO with recent events overnight.


2.  Re-check hemoglobin.





Objective





- Vital Signs


Vital signs: 


 Vital Signs











Temp  97.8 F   09/15/18 08:00


 


Pulse  93   09/15/18 09:00


 


Resp  15   09/15/18 09:00


 


BP  129/74   09/15/18 09:00


 


Pulse Ox  97   09/15/18 09:00








 Intake & Output











 09/14/18 09/15/18 09/15/18





 18:59 06:59 18:59


 


Intake Total 890 1770 375


 


Output Total   1


 


Balance 890 1770 374


 


Weight 110.4 kg 112.4 kg 


 


Intake:   


 


  IV  1150 300


 


    0.9 Bolus  1000 


 


    Piperacillin-Tazobactam 3  50 





    .375 gm In Dextrose/Water   





    1 50ml.bag @ 12.5 mls/hr   





    IVPB ONCE STA Rx#:   





    [id number]   


 


    Sodium Chloride 0.9% 1,  100 300





    000 ml @ 100 mls/hr IV .   





    Q10H [facility] Rx#:[id number]   


 


  Oral 580  


 


  Blood Product 310 620 75


 


    Rc As-1  Unit  310 





    [id number]   


 


    Rc As-1  Unit 310  





    [id number]   


 


    Rc As-1  Unit  0 





    [id number]   


 


Output:   


 


  Urine   0


 


  Stool   1


 


Other:   


 


  Voiding Method Toilet Urinal Urinal





 Urinal  


 


  # Voids 1  1














- Labs


CBC & Chem 7: 


 09/15/18 10:50





 09/15/18 10:50


Labs: 


 Abnormal Lab Results - Last 24 Hours (Table)











  09/11/18 09/14/18 09/14/18 Range/Units





  14:52 14:57 21:24 


 


WBC   11.5 H   (3.8-10.6)  k/uL


 


RBC   2.59 L  2.32 L  (4.30-5.90)  m/uL


 


Hgb   8.1 L  7.3 L  (13.0-17.5)  gm/dL


 


Hct   24.2 L  21.5 L  (39.0-53.0)  %


 


Neutrophils #   8.6 H   (1.3-7.7)  k/uL


 


Monocytes #     (0-1.0)  k/uL


 


APTT     (22.0-30.0)  sec


 


POC Glucose (mg/dL)     (75-99)  mg/dL


 


Crossmatch  See Detail    














  09/15/18 09/15/18 09/15/18 Range/Units





  01:04 01:28 01:28 


 


WBC     (3.8-10.6)  k/uL


 


RBC     (4.30-5.90)  m/uL


 


Hgb     (13.0-17.5)  gm/dL


 


Hct     (39.0-53.0)  %


 


Neutrophils #     (1.3-7.7)  k/uL


 


Monocytes #     (0-1.0)  k/uL


 


APTT   20.0 L   (22.0-30.0)  sec


 


POC Glucose (mg/dL)  118 H    (75-99)  mg/dL


 


Crossmatch    See Detail  














  09/15/18 09/15/18 Range/Units





  01:31 01:37 


 


WBC  18.4 H   (3.8-10.6)  k/uL


 


RBC  2.44 L   (4.30-5.90)  m/uL


 


Hgb  7.4 L   (13.0-17.5)  gm/dL


 


Hct  23.3 L   (39.0-53.0)  %


 


Neutrophils #  13.4 H   (1.3-7.7)  k/uL


 


Monocytes #  1.1 H   (0-1.0)  k/uL


 


APTT    (22.0-30.0)  sec


 


POC Glucose (mg/dL)   141 H  (75-99)  mg/dL


 


Crossmatch    














Assessment and Plan


(1) Diverticulitis


Current Visit: Yes   Status: Acute   Code(s): K57.92 - DVTRCLI OF INTEST, PART 

UNSP, W/O PERF OR ABSCESS W/O BLEED   SNOMED Code(s): 405035016


   





(2) GI bleed


Current Visit: Yes   Status: Acute   Code(s): K92.2 - GASTROINTESTINAL 

HEMORRHAGE, UNSPECIFIED   SNOMED Code(s): 69648399

## 2018-09-16 LAB
ALBUMIN SERPL-MCNC: 2 G/DL (ref 3.5–5)
ALP SERPL-CCNC: 74 U/L (ref 38–126)
ALT SERPL-CCNC: 48 U/L (ref 21–72)
ANION GAP SERPL CALC-SCNC: 4 MMOL/L
AST SERPL-CCNC: 37 U/L (ref 17–59)
BASOPHILS # BLD AUTO: 0.1 K/UL (ref 0–0.2)
BASOPHILS NFR BLD AUTO: 1 %
BUN SERPL-SCNC: 16 MG/DL (ref 9–20)
CALCIUM SPEC-MCNC: 7.9 MG/DL (ref 8.4–10.2)
CHLORIDE SERPL-SCNC: 119 MMOL/L (ref 98–107)
CO2 SERPL-SCNC: 21 MMOL/L (ref 22–30)
EOSINOPHIL # BLD AUTO: 0.5 K/UL (ref 0–0.7)
EOSINOPHIL NFR BLD AUTO: 4 %
ERYTHROCYTE [DISTWIDTH] IN BLOOD BY AUTOMATED COUNT: 2.5 M/UL (ref 4.3–5.9)
ERYTHROCYTE [DISTWIDTH] IN BLOOD: 15 % (ref 11.5–15.5)
GLUCOSE SERPL-MCNC: 86 MG/DL (ref 74–99)
HCT VFR BLD AUTO: 23.2 % (ref 39–53)
HGB BLD-MCNC: 7.7 GM/DL (ref 13–17.5)
LYMPHOCYTES # SPEC AUTO: 1.3 K/UL (ref 1–4.8)
LYMPHOCYTES NFR SPEC AUTO: 11 %
MAGNESIUM SPEC-SCNC: 2.2 MG/DL (ref 1.6–2.3)
MCH RBC QN AUTO: 30.9 PG (ref 25–35)
MCHC RBC AUTO-ENTMCNC: 33.2 G/DL (ref 31–37)
MCV RBC AUTO: 93 FL (ref 80–100)
MONOCYTES # BLD AUTO: 0.6 K/UL (ref 0–1)
MONOCYTES NFR BLD AUTO: 5 %
NEUTROPHILS # BLD AUTO: 9.6 K/UL (ref 1.3–7.7)
NEUTROPHILS NFR BLD AUTO: 79 %
PLATELET # BLD AUTO: 213 K/UL (ref 150–450)
POTASSIUM SERPL-SCNC: 4.1 MMOL/L (ref 3.5–5.1)
PROT SERPL-MCNC: 4.7 G/DL (ref 6.3–8.2)
SODIUM SERPL-SCNC: 144 MMOL/L (ref 137–145)
WBC # BLD AUTO: 12.2 K/UL (ref 3.8–10.6)

## 2018-09-16 RX ADMIN — DEXTROSE MONOHYDRATE SCH MLS/HR: 5 INJECTION, SOLUTION INTRAVENOUS at 03:59

## 2018-09-16 RX ADMIN — SODIUM CHLORIDE SCH MLS/HR: 9 INJECTION, SOLUTION INTRAVENOUS at 06:43

## 2018-09-16 RX ADMIN — CEFAZOLIN SCH MLS/HR: 330 INJECTION, POWDER, FOR SOLUTION INTRAMUSCULAR; INTRAVENOUS at 20:40

## 2018-09-16 RX ADMIN — ATORVASTATIN CALCIUM SCH MG: 40 TABLET, FILM COATED ORAL at 20:40

## 2018-09-16 RX ADMIN — PANTOPRAZOLE SODIUM SCH MG: 40 INJECTION, POWDER, FOR SOLUTION INTRAVENOUS at 09:07

## 2018-09-16 RX ADMIN — DEXTROSE MONOHYDRATE SCH MLS/HR: 5 INJECTION, SOLUTION INTRAVENOUS at 12:05

## 2018-09-16 RX ADMIN — PANTOPRAZOLE SODIUM SCH: 40 INJECTION, POWDER, FOR SOLUTION INTRAVENOUS at 20:40

## 2018-09-16 RX ADMIN — CEFAZOLIN SCH MLS/HR: 330 INJECTION, POWDER, FOR SOLUTION INTRAMUSCULAR; INTRAVENOUS at 11:36

## 2018-09-16 RX ADMIN — CEFAZOLIN SCH MLS/HR: 330 INJECTION, POWDER, FOR SOLUTION INTRAMUSCULAR; INTRAVENOUS at 09:06

## 2018-09-16 RX ADMIN — DEXTROSE MONOHYDRATE SCH MLS/HR: 5 INJECTION, SOLUTION INTRAVENOUS at 20:39

## 2018-09-16 NOTE — P.PN
Subjective


Progress Note Date: 09/16/18





This is a 78-year-old male patient of Dr. Kinney.  Patient presented to the 

hospital with feeling of increased weakness and dizziness.  Upon arrival to 

emergency room patient had 2 large bloody bowel movements.  Patient has known 

past medical history of CVA and hypertension.  Patient was given 1 unit of 

blood in the emergency room.  Patient was on Augmentin prior to hospitalization 

for dental work.  CT a of the head and neck completed showing no significant 

acute abnormality.  No significant change from recent CT neck study.  Chest x-

ray completed showing chronic changes and cardiomegaly without acute pulmonary 

process.  CT of head completed showing no acute intracranial hemorrhage or 

midline shift.  There is moderate diffuse age-related cerebral atrophy and 

chronic small vessel ischemic changes redemonstrated no significant change from 

recent CT.  CT of abdomen and pelvis completed showing compensated acute 

sigmoid diverticulitis, with a 8 x 7 x 5 cm Phlegmon juxtaposed between the 

sigmoid in the urinary bladder.  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 

consolidative obesity.  If no prior CTs available to ensure stability over time 

the instruments follow-up chest CT is recommended.  Dr. Larson per critical 

care and pulmonary services consulted.  Dr. Barrios per surgical service is 

consulted.  WBC upon arrival 20.1.  Patient currently on Zosyn for IV 

antibiotics.  Hemoglobin 10.7.  Patient did receive 1 unit of PRBCs.  Urine and 

blood cultures have been ordered.  At this time patient denies chest pain or 

shortness of breath.  Patient denies nausea vomiting or diarrhea.  Patient 

denies any urinary burning or frequency.  Abdominal mass felt in lower left 

quadrant.  Discussed case with Dr. Larson per critical care.  Dr. King per GI 

services have been consulted. Carcinoembryonic antigen ordered per Critical 

care.





On 09/13/2018 patient has been moved out of the intensive care unit.  Patient 

is currently alert and oriented 3 resting comfortably in bed denies any 

complaints at this time.  Patient does state he has had a bowel movement that 

appeared dark this a.m.  Patient denies any nausea or vomiting.  Patient denies 

chest pain or shortness of breath.  Denies any urinary burning or frequency.  

Patient is currently on a clear liquid diet per surgical services





On 09/14/2018 per nursing Patient had episode of tarry stool and hemoglobin 

dropped to 7.3.  Patient was symptomatic of low hemoglobin.  Patient received 1 

unit of PRBCs.  Per nursing staff patient will have EGD today.  White blood 

cell increasing to 18.0.  Dr. Santana for ID is following.  Patient maintained 

on vancomycin and Zosyn.





On 09/15/2018 patient is alert and oriented 3 in no apparent distress he 

denies any nausea or vomiting no abdominal pain at this time he had bloody 

bowel movements throughout the night and received 2 units of red blood cell 

transfusion he is feeling better since. He denies any chest pain or shortness 

of breath, no cough no nausea or vomiting and no urinary symptoms.





On 09/16/2018 patient was seen and examined, he is on telemetry floor out of 

the ICU, he denies any abdominal pain, he had 1 bloody bowel movement earlier 

today, otherwise he denies any symptoms there is no fever or chills no headache 

or dizziness no chest pain no shortness of breath no cough no nausea or 

vomiting no abdominal pain and no urinary symptoms.











Objective





- Vital Signs


Vital signs: 


 Vital Signs











Temp  97.3 F L  09/16/18 11:30


 


Pulse  89   09/16/18 11:30


 


Resp  20   09/16/18 11:30


 


BP  161/84   09/16/18 11:30


 


Pulse Ox  99   09/16/18 11:30








 Intake & Output











 09/15/18 09/16/18 09/16/18





 18:59 06:59 18:59


 


Intake Total 2120.0 1400 1381


 


Output Total 0 850 375


 


Balance 2120.0 550 1006


 


Weight  115.6 kg 


 


Intake:   


 


  IV 1500.0 1400 901


 


    Magnesium Sulfate-D5w Pmx 200 100 





    1 gm In Dextrose/Water 1   





    100ml.bag @ 100 mls/hr   





    IVPB Q1H [facility] Rx#:   





    [id number]   


 


    Piperacillin-Tazobactam 3 50.0 100 





    .375 gm In Dextrose/Water   





    1 50ml.bag @ 12.5 mls/hr   





    IVPB Q8H [facility] Rx#:   





    [id number]   


 


    Sodium Chloride 0.9% 1, 1000 1200 400





    000 ml @ 100 mls/hr IV .   





    Q10H [facility] Rx#:[id number]   


 


    Sodium Phosphate 10 mmol 250  





    In Sodium Chloride 0.9%   





    250 ml @ 125 mls/hr IVPB   





    ONCE ONE Rx#:[id number]   


 


    Vancomycin 1,750 mg In   501





    Sodium Chloride 0.9% 500   





    ml @ 167 mls/hr IVPB Q16H   





    [facility] Rx#:[id number]   


 


  Oral   480


 


  Blood Product 620  


 


    Rc As-1  Unit 310  





    [id number]   


 


Output:   


 


  Urine 0 850 375


 


Other:   


 


  Voiding Method Urinal Urinal Urinal


 


  # Voids 1 1 


 


  # Bowel Movements 1 1 














- Exam





Head normocephalic


Neck supple


Lungs clear to auscultation bilaterally no wheezing or crackles


Heart regular rate and rhythm S1-S2, no rub or gallop


Abdomen is soft nontender nondistended positive bowel sounds no 

hepatosplenomegaly.  Left lower quadrant abdominal mass


Extremities no edema


Neuro alert and orientated to 








- Labs


CBC & Chem 7: 


 09/16/18 04:59





 09/16/18 04:59


Labs: 


 Abnormal Lab Results - Last 24 Hours (Table)











  09/15/18 09/15/18 09/16/18 Range/Units





  17:20 23:39 04:59 


 


WBC  14.1 H  11.8 H  12.2 H  (3.8-10.6)  k/uL


 


RBC  2.42 L  2.35 L  2.50 L  (4.30-5.90)  m/uL


 


Hgb  7.4 L  7.2 L  7.7 L  (13.0-17.5)  gm/dL


 


Hct  22.3 L  21.8 L  23.2 L  (39.0-53.0)  %


 


Neutrophils #  10.9 H  8.8 H  9.6 H  (1.3-7.7)  k/uL


 


Chloride     ()  mmol/L


 


Carbon Dioxide     (22-30)  mmol/L


 


Creatinine     (0.66-1.25)  mg/dL


 


Calcium     (8.4-10.2)  mg/dL


 


Total Protein     (6.3-8.2)  g/dL


 


Albumin     (3.5-5.0)  g/dL














  09/16/18 Range/Units





  04:59 


 


WBC   (3.8-10.6)  k/uL


 


RBC   (4.30-5.90)  m/uL


 


Hgb   (13.0-17.5)  gm/dL


 


Hct   (39.0-53.0)  %


 


Neutrophils #   (1.3-7.7)  k/uL


 


Chloride  119 H  ()  mmol/L


 


Carbon Dioxide  21 L  (22-30)  mmol/L


 


Creatinine  1.89 H  (0.66-1.25)  mg/dL


 


Calcium  7.9 L  (8.4-10.2)  mg/dL


 


Total Protein  4.7 L  (6.3-8.2)  g/dL


 


Albumin  2.0 L  (3.5-5.0)  g/dL








 Microbiology - Last 24 Hours (Table)











 09/11/18 17:52 Blood Culture Gram Stain - Final





 Blood Blood Culture - Final





    Coagulase Negative Staph














Assessment and Plan


Plan: 





1 GI bleed secondary to acute sigmoid diverticulitis.  CT of the abdomen and 

pelvis completed showing complicated acute sigmoid diverticulitis with a 8 x 7 

x 5 CM Phlegmon juxtaposed between the sigmoid and the urinary bladder.  Dr. Barrios per surgical services consulted.  Patient currently nothing by mouth.  

Surgical services have been consulted.  Hemoglobin 10.7 patient did receive 1 

unit of PRBCs in emergency room.  Hemoglobin 9.3.  Per surgical services will 

treat patient conservatively now with IV antibiotic.  Patient had episode of 

tarry stools last night.  Patient received 1 unit of PRBCs for hemoglobin 7.3.  

Patient will have EGD today per GI services.  Patient remains on Vanco and 

Zosyn.  White blood cell increasing to 18.0





2. masslike collection within the abdomen measuring  8 x 7 x 5 cm in size in 

between the sigmoid and the urinary bladder.  GI services have been consulted 

per critical care and CEA ordered.  CEA level 1. per GI services patient will 

benefit from interventional radiology percutaneous drainage but will refer to 

general surgical services at this time.  Discussed case in depth labeled with 

GI service is planning for EGD and colonoscopy outpatient however timing will 

be decided on clinical course ideally would like to wait until chronic Phlegmon 

has improved with resolution of leukocytosis and fever.  No surgical 

intervention planned at this time per surgical services.  EGD planned today per 

GI services





3. patchy opacity left lower lobe.  CTA completed showing left lower lobe 5 x 5 

x 3 cm pulmonary consolidative opacity.  Dr. Larson following for pulmonary 

services.





4. previous history of CVA





5. leukocytosis.  Patient did have elevated temperature on arrival to emergency 

room. Blood and urine cultures have been ordered.  Patient currently on IV 

Zosyn and vancomycin for antibiotics.  Dr. Santana per infectious disease has 

been consulted





6. syncope.  CT of head completed showing no acute intracranial hemorrhage or 

midline shift.  There is moderate diffuse age-related cerebral atrophy and 

chronic small vessel ischemic change redemonstrated.  No significant change 

from recent CT





7.  Recent dental work.  Patient was previously taking Augmentin at home.  CTA 

completed showing no significant acute abnormality.  No significant change from 

recent CT of neck study.





8.  Acute kidney injury.  Creatinine increasing to 1.46.  Normal Saline at 100.

  Continue to Monitor closely





GI prophylaxis Protonix


Patient is improving he is out of ICU will continue to monitor


Recheck labs in a.m.

## 2018-09-16 NOTE — P.PN
Subjective


Progress Note Date: 09/16/18








78-year-old male patient, previous history of CVA, came into the hospital 

yesterday because of generalized weakness.  He was feeling dizzy and had a bout 

of syncope in the emergency department.  He immediately recovered.  No cardiac 

arrhythmias noted.  No chest pain.  His neurologic exam was nonfocal.  

Subsequently was found to have a low-grade fever.  At the later stage he 

developed a GI bleed and based on the emergency staff the patient had a bright 

red blood per rectum.  Because of the significant amount of blood that was noted

, the patient was given a unit of packed RBC.  Hemoglobin this morning is down 

to 10.7 from a baseline of 12.8.  He did have a low-grade fever in the 

emergency department with a white cell count of 20.1.  CAT scan of the abdomen 

was done and it showed sigmoid diverticulosis and an area of an abnormal 

thickening of the bowel which is ill-defined measuring around 8 x 7 x 4 cm in 

size anterior to the midline possibly a phlegmon versus a mass juxtaposed 

between the sigmoid and the urinary bladder.  The patient does not have any 

signs or symptoms of UTI.  No fecal material passing gas through his penis.  UA 

is negative.  He is hemodynamically stable.  He was placed on IV Zosyn.  White 

cell count is improving.  I examination today he has a firm infraumbilical mass 

that can be easily palpated.  No direct tenderness.  No rebound tensile 

guarding.  Cultures of been all negative thus far.  His EKG is normal sinus and 

the patient doesn't have any arrhythmias noted.  CAT scan of the brain and the 

CAT scan of the soft tissue of the neck were both negative.  No history of any 

constipation.  No history of any change in bowel habits.  The patient has not 

had a colonoscopy in the past.





The patient is seen again today 09/13/2018 in follow-up on the selective care 

unit.  He is currently resting quite comfortably in bed.  He denies any further 

abdominal discomfort.  No nausea or vomiting.  He did have a dark bowel 

movement this morning.  No noted bright red bleeding.  He denies any shortness 

of breath, cough or congestion.  No chest pain.  He is maintaining good O2 

saturations in the upper 90s on room air.  He's been afebrile.  Hemodynamically 

stable.  White count 13.9.  Hemoglobin 9.3.  Creatinine 0.82.  He remains on IV 

Protonix.  The plan is for probable EGD/colonoscopy in the outpatient setting.





The patient is seen again today 09/15/2018 in follow-up in the intensive care 

unit.  We'll reconsult for critical care management.  The patient did have a 

large bloody bowel movement approximately 2:00 this morning.  He developed 

significant hypotension and A team was called and he was transferred to the 

intensive care unit.  He did receive 2 units of packed red blood cells and 1 L 

of fluid.  He did undergo an EGD yesterday and was found to have no active 

bleeding.  There is a small nonbleeding 6 mm 8 antral polyp that was removed.  

Mild antral gastritis and mild duodenitis.  Surgical services were consulted 

and the plan is to continue with antibiotics for the acute diverticulitis and 

blood replacement if necessary. He is currently awake and alert in no acute 

distress.  He is maintaining good O2 saturation in the 90s on room air.  He 

denies any chest pain.  No abdominal discomfort whatsoever.  No palpable mass.  

He has 0.9 normal saline at 100 ML's per hour.  He did not require any pressor 

support.  Last hemoglobin 7.4.  Follow-up labs are pending.





On 09/16/2018, patient is in intensive care unit.  Doing well.  Afebrile.  

Hemodynamically stable.  No further episodes of GI bleeding.  Abdominal pain is 

subsided.  His abdomen is soft.  He'll be started on some clear liquid diet.  

Hemoglobin is stable.  The patient remains on IV Zosyn.  The patient had a 

diverticular bleed in addition to an acute diverticulitis with a phlegmon which 

is improving clinically.  Surgeries on the case.  No other significant events 

overnight.  The patient remains on the same antibiotic coverage which includes 

a combination of Zosyn and vancomycin.  No altered mentation.  No nausea.  No 

vomiting.  No emesis.  Hemoglobin is at 7.7.





Objective





- Vital Signs


Vital signs: 


 Vital Signs











Temp  98.1 F   09/16/18 08:00


 


Pulse  82   09/16/18 10:00


 


Resp  20   09/16/18 10:00


 


BP  145/81   09/16/18 10:00


 


Pulse Ox  98   09/16/18 09:00








 Intake & Output











 09/15/18 09/16/18 09/16/18





 18:59 06:59 18:59


 


Intake Total 2120.0 1400 774


 


Output Total 0 850 200


 


Balance 2120.0 550 574


 


Weight  115.6 kg 


 


Intake:   


 


  IV 1500.0 1400 534


 


    Magnesium Sulfate-D5w Pmx 200 100 





    1 gm In Dextrose/Water 1   





    100ml.bag @ 100 mls/hr   





    IVPB Q1H [facility] Rx#:   





    [id number]   


 


    Piperacillin-Tazobactam 3 50.0 100 





    .375 gm In Dextrose/Water   





    1 50ml.bag @ 12.5 mls/hr   





    IVPB Q8H [facility] Rx#:   





    [id number]   


 


    Sodium Chloride 0.9% 1, 1000 1200 200





    000 ml @ 100 mls/hr IV .   





    Q10H [facility] Rx#:[id number]   


 


    Sodium Phosphate 10 mmol 250  





    In Sodium Chloride 0.9%   





    250 ml @ 125 mls/hr IVPB   





    [facility] Rx#:[id number]   


 


    Vancomycin 1,750 mg In   334





    Sodium Chloride 0.9% 500   





    ml @ 167 mls/hr IVPB Q16H   





    [facility] Rx#:[id number]   


 


  Oral   240


 


  Blood Product 620  


 


    Rc As-1  Unit 310  





    [id number]   


 


Output:   


 


  Urine 0 850 200


 


Other:   


 


  Voiding Method Urinal Urinal Urinal


 


  # Voids 1 1 


 


  # Bowel Movements 1 1 














- Exam








Gen. appearance, comfortable likely distress


Head exam was generally normal. There was no scleral icterus or corneal arcus. 

Mucous membranes were moist.


Neck was supple and without jugular venous distension, thyromegaly, or carotid 

bruits. Carotids were easily palpable bilaterally. There was no adenopathy.


Lungs were clear to auscultation and percussion, and with normal diaphragmatic 

excursion. No wheezes or rales were noted. 


Cardiac exam revealed the PMI to be normally situated and sized. The rhythm was 

regular and no extrasystoles were noted during several minutes of auscultation. 

The first and second heart sounds were normal and physiologic splitting of the 

second heart sound was noted. There were no murmurs, rubs, clicks, or gallops.


Abdomen is soft and nontender.  Bowel sounds are present.


Examination of the extremities revealed easily palpable radial, femoral and 

pedal pulses. There was no cyanosis, clubbing or edema.


Examination of the skin revealed no evidence of significant rashes, suspicious 

appearing nevi or other concerning lesions.


Neurologically awake and alert and is no focal neurological deficit.





- Labs


CBC & Chem 7: 


 09/16/18 04:59





 09/16/18 04:59


Labs: 


 Abnormal Lab Results - Last 24 Hours (Table)











  09/15/18 09/15/18 09/15/18 Range/Units





  10:50 10:50 17:20 


 


WBC  15.5 H   14.1 H  (3.8-10.6)  k/uL


 


RBC  2.47 L   2.42 L  (4.30-5.90)  m/uL


 


Hgb  7.5 L   7.4 L  (13.0-17.5)  gm/dL


 


Hct  22.9 L   22.3 L  (39.0-53.0)  %


 


Neutrophils #  13.4 H   10.9 H  (1.3-7.7)  k/uL


 


Chloride   120 H   ()  mmol/L


 


Carbon Dioxide   21 L   (22-30)  mmol/L


 


Creatinine   1.82 H   (0.66-1.25)  mg/dL


 


Glucose   150 H   (74-99)  mg/dL


 


Calcium   7.9 L   (8.4-10.2)  mg/dL


 


Phosphorus   2.3 L   (2.5-4.5)  mg/dL


 


Total Protein   4.7 L   (6.3-8.2)  g/dL


 


Albumin   2.0 L   (3.5-5.0)  g/dL














  09/15/18 09/16/18 09/16/18 Range/Units





  23:39 04:59 04:59 


 


WBC  11.8 H  12.2 H   (3.8-10.6)  k/uL


 


RBC  2.35 L  2.50 L   (4.30-5.90)  m/uL


 


Hgb  7.2 L  7.7 L   (13.0-17.5)  gm/dL


 


Hct  21.8 L  23.2 L   (39.0-53.0)  %


 


Neutrophils #  8.8 H  9.6 H   (1.3-7.7)  k/uL


 


Chloride    119 H  ()  mmol/L


 


Carbon Dioxide    21 L  (22-30)  mmol/L


 


Creatinine    1.89 H  (0.66-1.25)  mg/dL


 


Glucose     (74-99)  mg/dL


 


Calcium    7.9 L  (8.4-10.2)  mg/dL


 


Phosphorus     (2.5-4.5)  mg/dL


 


Total Protein    4.7 L  (6.3-8.2)  g/dL


 


Albumin    2.0 L  (3.5-5.0)  g/dL








 Microbiology - Last 24 Hours (Table)











 09/11/18 17:52 Blood Culture Gram Stain - Final





 Blood Blood Culture - Final





    Coagulase Negative Staph














Assessment and Plan


Plan: 











Assessment





1 acute sigmoid diverticulitis with low-grade fever and leukocytosis currently 

on IV Zosyn and vancomycin





2 masslike collection within the abdomen measuring  8 x 7 x 5 cm in size in 

between the sigmoid and the urinary bladder.  This could be an infected 

complicated diverticulitis segment of the bowel.  Underlying colon mass cannot 

be completely excluded knowing that the patient has had significant amount of 

GI bleed which is unusual for acute diverticulitis.  Clinically the patient is 

improving and the patient is abdomen is nontender and the collection is felt to 

be softer while the patient being treated with IV antibiotics.





3 patchy opacity left lower lobe, couldn't examine underlying inflammatory 

pneumonia although clinically the patient does not show any signs or symptoms 

of pneumonia at this point in time





4 previous history of CVA





5 leukocytosis improving





6 syncope with a negative CAT scan of the head.





7 acute diverticular GI bleed currently inactive in stable and the patient's 

hemoglobin is stable at 7.7.





Plan





Allow clear liquid diet.  Continued IV Zosyn.  Continue vancomycin.  Monitor 

hemoglobin and watch for any signs of GI bleeding.  The patient has a stable 

renal function.  General surgeries on the case.  EGD results were noted.  He 

will need probably a surgical intervention or a sigmoid colectomy at a later 

stage the timing to be decided by general surgeon.  He can be moved out of the 

intensive care unit today.

## 2018-09-16 NOTE — P.PN
Subjective


Progress Note Date: 09/16/18











CHIEF COMPLAINT: History of GI bleed





HISTORY OF PRESENT ILLNESS: The patient is a 78-year-old gentleman with history 

of pelvic abscess including GI bleed.  He completed an upper endoscopy.  He had 

2 additional episodes of tarry stools.  Clinically he feels well.  He denies 

any abdominal pain.  He has been transferred back from the ICU to [facility] 

care.  He is on clear liquid.  Hemoglobin maintained at about 7.





PHYSICAL EXAM: 





GENERAL: Well-developed in no distress. 


HEENT: No scleral icterus.  Extraocular movements grossly intact.  Hears 

conversational speech.  No nasal drainage.


NECK: Supple without lymphadenopathy.  


CHEST: Nonlabored respirations with equal bilateral excursions. 


CARDIOVASCULAR: Regular rate and regular rhythm. Distal 2+ pulses. 


ABDOMEN: Soft, nontender, nondistended


MUSCULOSKELETAL: No clubbing, cyanosis 


NEURO: No focal or lateralizing signs. Cranial nerves 2 through 12 grossly 

within normal limits. 


PSYCH: Appropriate affect. Alert and oriented to person, place and time.


SKIN: Good skin turgor.  Well perfused.





ASSESSMENT: 


1. GI bleed


2. History of pelvic abscess


3. Acute blood loss anemia from lower GI bleed.





PLAN:


1.  Recommend no advancement of diet at this time for his history of tarry 

stools.


2.  May benefit from iron infusion for anemia





Objective





- Vital Signs


Vital signs: 


 Vital Signs











Temp  97.3 F L  09/16/18 11:30


 


Pulse  89   09/16/18 11:30


 


Resp  20   09/16/18 11:30


 


BP  161/84   09/16/18 11:30


 


Pulse Ox  99   09/16/18 11:30








 Intake & Output











 09/15/18 09/16/18 09/16/18





 18:59 06:59 18:59


 


Intake Total 2120.0 1400 1381


 


Output Total 0 850 375


 


Balance 2120.0 550 1006


 


Weight  115.6 kg 


 


Intake:   


 


  IV 1500.0 1400 901


 


    Magnesium Sulfate-D5w Pmx 200 100 





    1 gm In Dextrose/Water 1   





    100ml.bag @ 100 mls/hr   





    IVPB Q1H [facility] Rx#:   





    [id number]   


 


    Piperacillin-Tazobactam 3 50.0 100 





    .375 gm In Dextrose/Water   





    1 50ml.bag @ 12.5 mls/hr   





    IVPB Q8H [facility] Rx#:   





    [id number]   


 


    Sodium Chloride 0.9% 1, 1000 1200 400





    000 ml @ 100 mls/hr IV .   





    Q10H [facility] Rx#:[id number]   


 


    Sodium Phosphate 10 mmol 250  





    In Sodium Chloride 0.9%   





    250 ml @ 125 mls/hr IVPB   





    [facility] Rx#:[id number]   


 


    Vancomycin 1,750 mg In   501





    Sodium Chloride 0.9% 500   





    ml @ 167 mls/hr IVPB Q16H   





    [facility] Rx#:[id number]   


 


  Oral   480


 


  Blood Product 620  


 


    Rc As-1  Unit 310  





    [id number]   


 


Output:   


 


  Urine 0 850 375


 


Other:   


 


  Voiding Method Urinal Urinal Urinal


 


  # Voids 1 1 


 


  # Bowel Movements 1 1 














- Labs


CBC & Chem 7: 


 09/16/18 04:59





 09/16/18 04:59


Labs: 


 Abnormal Lab Results - Last 24 Hours (Table)











  09/15/18 09/15/18 09/16/18 Range/Units





  17:20 23:39 04:59 


 


WBC  14.1 H  11.8 H  12.2 H  (3.8-10.6)  k/uL


 


RBC  2.42 L  2.35 L  2.50 L  (4.30-5.90)  m/uL


 


Hgb  7.4 L  7.2 L  7.7 L  (13.0-17.5)  gm/dL


 


Hct  22.3 L  21.8 L  23.2 L  (39.0-53.0)  %


 


Neutrophils #  10.9 H  8.8 H  9.6 H  (1.3-7.7)  k/uL


 


Chloride     ()  mmol/L


 


Carbon Dioxide     (22-30)  mmol/L


 


Creatinine     (0.66-1.25)  mg/dL


 


Calcium     (8.4-10.2)  mg/dL


 


Total Protein     (6.3-8.2)  g/dL


 


Albumin     (3.5-5.0)  g/dL














  09/16/18 Range/Units





  04:59 


 


WBC   (3.8-10.6)  k/uL


 


RBC   (4.30-5.90)  m/uL


 


Hgb   (13.0-17.5)  gm/dL


 


Hct   (39.0-53.0)  %


 


Neutrophils #   (1.3-7.7)  k/uL


 


Chloride  119 H  ()  mmol/L


 


Carbon Dioxide  21 L  (22-30)  mmol/L


 


Creatinine  1.89 H  (0.66-1.25)  mg/dL


 


Calcium  7.9 L  (8.4-10.2)  mg/dL


 


Total Protein  4.7 L  (6.3-8.2)  g/dL


 


Albumin  2.0 L  (3.5-5.0)  g/dL








 Microbiology - Last 24 Hours (Table)











 09/11/18 17:52 Blood Culture Gram Stain - Final





 Blood Blood Culture - Final





    Coagulase Negative Staph














Assessment and Plan


(1) Diverticulitis


Current Visit: Yes   Status: Acute   Code(s): K57.92 - DVTRCLI OF INTEST, PART 

UNSP, W/O PERF OR ABSCESS W/O BLEED   SNOMED Code(s): 772269846


   





(2) GI bleed


Current Visit: Yes   Status: Acute   Code(s): K92.2 - GASTROINTESTINAL 

HEMORRHAGE, UNSPECIFIED   SNOMED Code(s): 67467992


   





(3) Acute blood loss anemia


Current Visit: Yes   Status: Acute   Code(s): D62 - ACUTE POSTHEMORRHAGIC 

ANEMIA   SNOMED Code(s): 034301006

## 2018-09-17 LAB
ALBUMIN SERPL-MCNC: 2.1 G/DL (ref 3.5–5)
ALP SERPL-CCNC: 78 U/L (ref 38–126)
ALT SERPL-CCNC: 43 U/L (ref 21–72)
ANION GAP SERPL CALC-SCNC: 3 MMOL/L
AST SERPL-CCNC: 32 U/L (ref 17–59)
BASOPHILS # BLD AUTO: 0.1 K/UL (ref 0–0.2)
BASOPHILS NFR BLD AUTO: 1 %
BUN SERPL-SCNC: 13 MG/DL (ref 9–20)
CALCIUM SPEC-MCNC: 8.1 MG/DL (ref 8.4–10.2)
CHLORIDE SERPL-SCNC: 117 MMOL/L (ref 98–107)
CO2 SERPL-SCNC: 23 MMOL/L (ref 22–30)
EOSINOPHIL # BLD AUTO: 0.4 K/UL (ref 0–0.7)
EOSINOPHIL NFR BLD AUTO: 4 %
ERYTHROCYTE [DISTWIDTH] IN BLOOD BY AUTOMATED COUNT: 2.3 M/UL (ref 4.3–5.9)
ERYTHROCYTE [DISTWIDTH] IN BLOOD: 15.4 % (ref 11.5–15.5)
GLUCOSE SERPL-MCNC: 82 MG/DL (ref 74–99)
HCT VFR BLD AUTO: 21.6 % (ref 39–53)
HGB BLD-MCNC: 7.1 GM/DL (ref 13–17.5)
LYMPHOCYTES # SPEC AUTO: 1.1 K/UL (ref 1–4.8)
LYMPHOCYTES NFR SPEC AUTO: 9 %
MCH RBC QN AUTO: 30.9 PG (ref 25–35)
MCHC RBC AUTO-ENTMCNC: 32.9 G/DL (ref 31–37)
MCV RBC AUTO: 93.8 FL (ref 80–100)
MONOCYTES # BLD AUTO: 0.7 K/UL (ref 0–1)
MONOCYTES NFR BLD AUTO: 6 %
NEUTROPHILS # BLD AUTO: 9.6 K/UL (ref 1.3–7.7)
NEUTROPHILS NFR BLD AUTO: 80 %
PLATELET # BLD AUTO: 229 K/UL (ref 150–450)
POTASSIUM SERPL-SCNC: 4.3 MMOL/L (ref 3.5–5.1)
PROT SERPL-MCNC: 4.9 G/DL (ref 6.3–8.2)
SODIUM SERPL-SCNC: 143 MMOL/L (ref 137–145)
WBC # BLD AUTO: 12.1 K/UL (ref 3.8–10.6)

## 2018-09-17 RX ADMIN — ATORVASTATIN CALCIUM SCH MG: 40 TABLET, FILM COATED ORAL at 20:19

## 2018-09-17 RX ADMIN — PANTOPRAZOLE SODIUM SCH MG: 40 INJECTION, POWDER, FOR SOLUTION INTRAVENOUS at 20:19

## 2018-09-17 RX ADMIN — DEXTROSE MONOHYDRATE SCH MLS/HR: 5 INJECTION, SOLUTION INTRAVENOUS at 01:10

## 2018-09-17 RX ADMIN — PANTOPRAZOLE SODIUM SCH MG: 40 INJECTION, POWDER, FOR SOLUTION INTRAVENOUS at 08:28

## 2018-09-17 RX ADMIN — CEFAZOLIN SCH MLS/HR: 330 INJECTION, POWDER, FOR SOLUTION INTRAMUSCULAR; INTRAVENOUS at 14:36

## 2018-09-17 RX ADMIN — DEXTROSE MONOHYDRATE SCH MLS/HR: 5 INJECTION, SOLUTION INTRAVENOUS at 20:19

## 2018-09-17 RX ADMIN — ACETAMINOPHEN PRN MG: 325 TABLET, FILM COATED ORAL at 14:55

## 2018-09-17 RX ADMIN — SODIUM CHLORIDE SCH MLS/HR: 9 INJECTION, SOLUTION INTRAVENOUS at 05:20

## 2018-09-17 RX ADMIN — Medication SCH MG: at 17:49

## 2018-09-17 RX ADMIN — DEXTROSE MONOHYDRATE SCH MLS/HR: 5 INJECTION, SOLUTION INTRAVENOUS at 13:08

## 2018-09-17 NOTE — P.PN
Subjective


Progress Note Date: 09/17/18


Principal diagnosis: 





Acute sigmoid diverticulitis and acute diverticular bleed.


78-year-old male patient, previous history of CVA, came into the hospital 

yesterday because of generalized weakness.  He was feeling dizzy and had a bout 

of syncope in the emergency department.  He immediately recovered.  No cardiac 

arrhythmias noted.  No chest pain.  His neurologic exam was nonfocal.  

Subsequently was found to have a low-grade fever.  At the later stage he 

developed a GI bleed and based on the emergency staff the patient had a bright 

red blood per rectum.  Because of the significant amount of blood that was noted

, the patient was given a unit of packed RBC.  Hemoglobin this morning is down 

to 10.7 from a baseline of 12.8.  He did have a low-grade fever in the 

emergency department with a white cell count of 20.1.  CAT scan of the abdomen 

was done and it showed sigmoid diverticulosis and an area of an abnormal 

thickening of the bowel which is ill-defined measuring around 8 x 7 x 4 cm in 

size anterior to the midline possibly a phlegmon versus a mass juxtaposed 

between the sigmoid and the urinary bladder.  The patient does not have any 

signs or symptoms of UTI.  No fecal material passing gas through his penis.  UA 

is negative.  He is hemodynamically stable.  He was placed on IV Zosyn.  White 

cell count is improving.  I examination today he has a firm infraumbilical mass 

that can be easily palpated.  No direct tenderness.  No rebound tensile 

guarding.  Cultures of been all negative thus far.  His EKG is normal sinus and 

the patient doesn't have any arrhythmias noted.  CAT scan of the brain and the 

CAT scan of the soft tissue of the neck were both negative.  No history of any 

constipation.  No history of any change in bowel habits.  The patient has not 

had a colonoscopy in the past.





The patient is seen again today 09/13/2018 in follow-up on the selective care 

unit.  He is currently resting quite comfortably in bed.  He denies any further 

abdominal discomfort.  No nausea or vomiting.  He did have a dark bowel 

movement this morning.  No noted bright red bleeding.  He denies any shortness 

of breath, cough or congestion.  No chest pain.  He is maintaining good O2 

saturations in the upper 90s on room air.  He's been afebrile.  Hemodynamically 

stable.  White count 13.9.  Hemoglobin 9.3.  Creatinine 0.82.  He remains on IV 

Protonix.  The plan is for probable EGD/colonoscopy in the outpatient setting.





The patient is seen again today 09/15/2018 in follow-up in the intensive care 

unit.  We'll reconsult for critical care management.  The patient did have a 

large bloody bowel movement approximately 2:00 this morning.  He developed 

significant hypotension and A team was called and he was transferred to the 

intensive care unit.  He did receive 2 units of packed red blood cells and 1 L 

of fluid.  He did undergo an EGD yesterday and was found to have no active 

bleeding.  There is a small nonbleeding 6 mm 8 antral polyp that was removed.  

Mild antral gastritis and mild duodenitis.  Surgical services were consulted 

and the plan is to continue with antibiotics for the acute diverticulitis and 

blood replacement if necessary. He is currently awake and alert in no acute 

distress.  He is maintaining good O2 saturation in the 90s on room air.  He 

denies any chest pain.  No abdominal discomfort whatsoever.  No palpable mass.  

He has 0.9 normal saline at 100 ML's per hour.  He did not require any pressor 

support.  Last hemoglobin 7.4.  Follow-up labs are pending.





On 09/16/2018, patient is in intensive care unit.  Doing well.  Afebrile.  

Hemodynamically stable.  No further episodes of GI bleeding.  Abdominal pain is 

subsided.  His abdomen is soft.  He'll be started on some clear liquid diet.  

Hemoglobin is stable.  The patient remains on IV Zosyn.  The patient had a 

diverticular bleed in addition to an acute diverticulitis with a phlegmon which 

is improving clinically.  Surgeries on the case.  No other significant events 

overnight.  The patient remains on the same antibiotic coverage which includes 

a combination of Zosyn and vancomycin.  No altered mentation.  No nausea.  No 

vomiting.  No emesis.  Hemoglobin is at 7.7.





Reevaluated today on 9/17/2018, patient is now on selective care unit, feeling 

much better, afebrile, no further episodes of abdominal pain, and no further 

episodes of GI bleeding.  Patient is now on soft diet, hemoglobin is 7.1, has 

been ranging between 7.2 and 7.7 in the last 2 days.  Asymptomatic metabolic 

profile is normal however his creatinine is up to 2.06.  It was 1.89 yesterday.

  Remains on antibiotics in the form of Zosyn.  He is also on vancomycin.  

Clinically there has been a significant improvement over the last few days.











Objective





- Vital Signs


Vital signs: 


 Vital Signs











Temp  97.7 F   09/17/18 08:20


 


Pulse  88   09/17/18 08:20


 


Resp  16   09/17/18 08:20


 


BP  139/77   09/17/18 08:20


 


Pulse Ox  99   09/17/18 08:20








 Intake & Output











 09/16/18 09/17/18 09/17/18





 18:59 06:59 18:59


 


Intake Total 4757 644 5210


 


Output Total 825 275 400


 


Balance 976 275 800


 


Weight  116.1 kg 


 


Intake:   


 


   500 800


 


    Sodium Chloride 0.9% 1, 400 500 800





    000 ml @ 100 mls/hr IV .   





    Q10H [facility] Rx#:[id number]   


 


    Vancomycin 1,750 mg In 501  





    Sodium Chloride 0.9% 500   





    ml @ 167 mls/hr IVPB Q16H   





    [facility] Rx#:[id number]   


 


  Intake, IV Titration  50 





  Amount   


 


    Piperacillin-Tazobactam 3  50 





    .375 gm In Dextrose/Water   





    1 50ml.bag @ 12.5 mls/hr   





    IVPB Q8H [facility] Rx#:   





    [id number]   


 


  Oral 900  400


 


Output:   


 


  Urine 825 275 400


 


Other:   


 


  Voiding Method Toilet Toilet Toilet





 Urinal Urinal Urinal


 


  # Voids 1  


 


  # Bowel Movements 1  














- Exam





Gen. appearance, 78-year-old white male in no distress, very pleasant.


Head exam was generally normal. There was no scleral icterus or corneal arcus. 

Mucous membranes were moist.


Neck was supple and without jugular venous distension, thyromegaly, or carotid 

bruits. Carotids were easily palpable bilaterally. There was no adenopathy.


Lungs were clear to auscultation and percussion, and with normal diaphragmatic 

excursion. No wheezes or rales were noted. 


Cardiac exam revealed the PMI to be normally situated and sized. The rhythm was 

regular and no extrasystoles were noted during several minutes of auscultation. 

The first and second heart sounds were normal and physiologic splitting of the 

second heart sound was noted. There were no murmurs, rubs, clicks, or gallops.


Abdomen obese, soft, nontender, no megaly, no rebound, no guarding, positive 

bowel sounds.


Examination of the extremities revealed easily palpable radial, femoral and 

pedal pulses. There was no cyanosis, clubbing or edema.


Examination of the skin revealed no evidence of significant rashes, suspicious 

appearing nevi or other concerning lesions.


Neurologically awake and alert and is no focal neurological deficit.











- Labs


CBC & Chem 7: 


 09/17/18 06:41





 09/17/18 06:41


Labs: 


 Abnormal Lab Results - Last 24 Hours (Table)











  09/17/18 09/17/18 Range/Units





  06:41 06:41 


 


WBC   12.1 H  (3.8-10.6)  k/uL


 


RBC   2.30 L  (4.30-5.90)  m/uL


 


Hgb   7.1 L  (13.0-17.5)  gm/dL


 


Hct   21.6 L  (39.0-53.0)  %


 


Neutrophils #   9.6 H  (1.3-7.7)  k/uL


 


Chloride  117 H   ()  mmol/L


 


Creatinine  2.06 H   (0.66-1.25)  mg/dL


 


Calcium  8.1 L   (8.4-10.2)  mg/dL


 


Total Protein  4.9 L   (6.3-8.2)  g/dL


 


Albumin  2.1 L   (3.5-5.0)  g/dL














Assessment and Plan


Assessment: 








1 acute sigmoid diverticulitis with low-grade fever and leukocytosis currently 

on IV Zosyn and vancomycin





2 masslike collection within the abdomen measuring  8 x 7 x 5 cm in size in 

between the sigmoid and the urinary bladder.  This could be an infected 

complicated diverticulitis segment of the bowel.  Underlying colon mass cannot 

be completely excluded knowing that the patient has had significant amount of 

GI bleed which is unusual for acute diverticulitis.  Clinically the patient is 

improving and the patient is abdomen is nontender and the collection is felt to 

be softer while the patient being treated with IV antibiotics.





3 patchy opacity left lower lobe, couldn't examine underlying inflammatory 

pneumonia although clinically the patient does not show any signs or symptoms 

of pneumonia at this point in time





4 previous history of CVA





5 leukocytosis improving





6 syncope with a negative CAT scan of the head.





7 acute diverticular GI bleed currently inactive in stable and the patient's 

hemoglobin is stable at 7. 1





Recommendation: Continue present course of treatment including antibiotics, 

continue to monitor hemoglobin, patient has no active GI bleeding at present, 

he is being followed by many consultants for his diverticulitis and GI bleeding

, will continue to follow if needed with the rest of the consultants.





Time with Patient: Less than 30

## 2018-09-17 NOTE — P.PN
Subjective


Progress Note Date: 09/17/18





78-year-old  male presents to Hospital feeling weak for relatively 

short period of time before admission, he however developed bloody stool and 

constantly sought medical care.  Upon arrival imaging studies revealed evidence 

of significant diverticulitis with phlegmon in the sigmoid area abutting the 

urinary bladder.  The patient for shall he has no urinary symptoms, no 

hematuria or urinary urgency or dysuria.  He's had no air or feculent material 

in his urine.  The patient is thought to have a known history of diverticulosis 

but has never had severe diverticulitis.  Does not recall any specific recent 

changes before the onset of this difficulty.


Denies any history of high-grade fevers chills rigors or sweats before 

admission.  Denies significant nausea or emesis or prior bouts of hematemesis 

melena or hematochezia before admission.


09/17/2018 patient is feeling better, no further gastrointestinal bleeding.  Is 

having no urinary symptoms and overall is feeling better.  Cultures become 

available and vancomycin has been discontinued.





Objective





- Vital Signs


Vital signs: 


 Vital Signs











Temp  98.2 F   09/17/18 16:55


 


Pulse  81   09/17/18 16:55


 


Resp  16   09/17/18 16:55


 


BP  146/88   09/17/18 16:55


 


Pulse Ox  97   09/17/18 16:55








 Intake & Output











 09/17/18 09/17/18 09/18/18





 06:59 18:59 06:59


 


Intake Total 550 1680 


 


Output Total 275 575 


 


Balance 275 1105 


 


Weight 116.1 kg  


 


Intake:   


 


   800 


 


    Sodium Chloride 0.9% 1, 500 800 





    000 ml @ 100 mls/hr IV .   





    Q10H ZAINAB Rx#:[id number]   


 


  Intake, IV Titration 50  





  Amount   


 


    Piperacillin-Tazobactam 3 50  





    .375 gm In Dextrose/Water   





    1 50ml.bag @ 12.5 mls/hr   





    IVPB Q8H ZAINAB Rx#:   





    [id number]   


 


  Oral  880 


 


Output:   


 


  Urine 275 575 


 


Other:   


 


  Voiding Method Toilet Toilet 





 Urinal Urinal 


 


  # Voids  1 














- Exam





Pleasant 78-year-old male in no gail distress


HEENT: Anicteric conjunctiva are pink and moist nasal mucosa grossly intact 

without significant lesions, there is no thrush.


Neck: The neck is supple without significant lymphadenopathy or thyromegaly.


Lungs: Good bilateral air entry without significant crackles or wheezing.  

There is no significant bronchial sounds.  There is no egophony or dullness.


Heart: Regular rate and rhythm with an audible S1-S2, no S3 no S4.  There is no 

significant murmur click or rub, PMI was nondisplaced.


Abdomen: Positive bowel sounds soft and nontender without palpable masses or 

organomegaly.  There was no guarding or rebound.


Extremities: The upper extremities have excellent pulses they are symmetric, no 

significant petechiae or telangiectasia.  No splinter hemorrhages were noted.  

The lower extremities are free from significant edema.  The peripheral pulses 

were 2+ and symmetric.


Neuro: Awake alert oriented to person place and time.  There are no acute new 

gross focal sensory motor deficits.








- Labs


CBC & Chem 7: 


 09/17/18 06:41





 09/17/18 06:41


Labs: 


 Abnormal Lab Results - Last 24 Hours (Table)











  09/17/18 09/17/18 Range/Units





  06:41 06:41 


 


WBC   12.1 H  (3.8-10.6)  k/uL


 


RBC   2.30 L  (4.30-5.90)  m/uL


 


Hgb   7.1 L  (13.0-17.5)  gm/dL


 


Hct   21.6 L  (39.0-53.0)  %


 


Neutrophils #   9.6 H  (1.3-7.7)  k/uL


 


Chloride  117 H   ()  mmol/L


 


Creatinine  2.06 H   (0.66-1.25)  mg/dL


 


Calcium  8.1 L   (8.4-10.2)  mg/dL


 


Total Protein  4.9 L   (6.3-8.2)  g/dL


 


Albumin  2.1 L   (3.5-5.0)  g/dL








 Laboratory Results











WBC  12.1 k/uL (3.8-10.6)  H  09/17/18  06:41    


 


RBC  2.30 m/uL (4.30-5.90)  L  09/17/18  06:41    


 


Hgb  7.1 gm/dL (13.0-17.5)  L  09/17/18  06:41    


 


Hct  21.6 % (39.0-53.0)  L  09/17/18  06:41    


 


MCV  93.8 fL (80.0-100.0)   09/17/18  06:41    


 


MCH  30.9 pg (25.0-35.0)   09/17/18  06:41    


 


MCHC  32.9 g/dL (31.0-37.0)   09/17/18  06:41    


 


RDW  15.4 % (11.5-15.5)   09/17/18  06:41    


 


Plt Count  229 k/uL (150-450)   09/17/18  06:41    


 


Neutrophils %  80 %  09/17/18  06:41    


 


Lymphocytes %  9 %  09/17/18  06:41    


 


Monocytes %  6 %  09/17/18  06:41    


 


Eosinophils %  4 %  09/17/18  06:41    


 


Basophils %  1 %  09/17/18  06:41    


 


Neutrophils #  9.6 k/uL (1.3-7.7)  H  09/17/18  06:41    


 


Lymphocytes #  1.1 k/uL (1.0-4.8)   09/17/18  06:41    


 


Monocytes #  0.7 k/uL (0-1.0)   09/17/18  06:41    


 


Eosinophils #  0.4 k/uL (0-0.7)   09/17/18  06:41    


 


Basophils #  0.1 k/uL (0-0.2)   09/17/18  06:41    


 


Hypochromasia  Slight   09/17/18  06:41    


 


PT  11.1 sec (9.0-12.0)   09/15/18  01:28    


 


INR  1.1  (<1.2)   09/15/18  01:28    


 


APTT  20.0 sec (22.0-30.0)  L  09/15/18  01:28    


 


Sodium  143 mmol/L (137-145)   09/17/18  06:41    


 


Potassium  4.3 mmol/L (3.5-5.1)   09/17/18  06:41    


 


Chloride  117 mmol/L ()  H  09/17/18  06:41    


 


Carbon Dioxide  23 mmol/L (22-30)   09/17/18  06:41    


 


Anion Gap  3 mmol/L  09/17/18  06:41    


 


BUN  13 mg/dL (9-20)   09/17/18  06:41    


 


Creatinine  2.06 mg/dL (0.66-1.25)  H  09/17/18  06:41    


 


Est GFR (CKD-EPI)AfAm  35  (>60 ml/min/1.73 sqM)   09/17/18  06:41    


 


Est GFR (CKD-EPI)NonAf  30  (>60 ml/min/1.73 sqM)   09/17/18  06:41    


 


Glucose  82 mg/dL (74-99)   09/17/18  06:41    


 


POC Glucose (mg/dL)  141 mg/dL (75-99)  H  09/15/18  01:37    


 


POC Glu Operater Joan Reynoso   09/15/18  01:37    


 


Calcium  8.1 mg/dL (8.4-10.2)  L  09/17/18  06:41    


 


Phosphorus  3.5 mg/dL (2.5-4.5)   09/16/18  04:59    


 


Magnesium  2.2 mg/dL (1.6-2.3)   09/16/18  04:59    


 


Total Bilirubin  0.3 mg/dL (0.2-1.3)   09/17/18  06:41    


 


AST  32 U/L (17-59)   09/17/18  06:41    


 


ALT  43 U/L (21-72)   09/17/18  06:41    


 


Alkaline Phosphatase  78 U/L ()   09/17/18  06:41    


 


Total Creatine Kinase  52 U/L ()  L  09/11/18  14:54    


 


CK-MB (CK-2)  0.6 ng/mL (0.0-2.4)   09/11/18  14:54    


 


CK-MB (CK-2) Rel Index  1.2   09/11/18  14:54    


 


Troponin I  <0.012 ng/mL (0.000-0.034)   09/11/18  14:54    


 


Total Protein  4.9 g/dL (6.3-8.2)  L  09/17/18  06:41    


 


Albumin  2.1 g/dL (3.5-5.0)  L  09/17/18  06:41    


 


Carcinoembryonic Ag  1.2 ng/mL (0.0-4.9)   09/11/18  14:55    


 


Urine Color  Yellow   09/11/18  16:52    


 


Urine Appearance  Clear  (Clear)   09/11/18  16:52    


 


Urine pH  5.5  (5.0-8.0)   09/11/18  16:52    


 


Ur Specific Gravity  >1.050  (1.001-1.035)  H  09/11/18  16:52    


 


Urine Protein  1+  (Negative)  H  09/11/18  16:52    


 


Urine Glucose (UA)  Negative  (Negative)   09/11/18  16:52    


 


Urine Ketones  Negative  (Negative)   09/11/18  16:52    


 


Urine Blood  Small  (Negative)  H  09/11/18  16:52    


 


Urine Nitrite  Negative  (Negative)   09/11/18  16:52    


 


Urine Bilirubin  Negative  (Negative)   09/11/18  16:52    


 


Urine Urobilinogen  <2.0 mg/dL (<2.0)   09/11/18  16:52    


 


Ur Leukocyte Esterase  Trace  (Negative)  H  09/11/18  16:52    


 


Urine RBC  14 /hpf (0-5)  H  09/11/18  16:52    


 


Urine WBC  2 /hpf (0-5)   09/11/18  16:52    


 


Urine Mucus  Moderate /hpf (None)  H  09/11/18  16:52    


 


Vancomycin Trough  25.2 ug/mL  09/15/18  17:20    


 


Blood Type  A Positive   09/15/18  01:28    


 


Blood Type Confirm  A Positive   09/11/18  17:52    


 


Blood Type Recheck  No   09/15/18  01:28    


 


Antibody Screen  NEGATIVE   09/15/18  01:28    


 


Crossmatch  See Detail   09/15/18  01:28    


 


Spec Expiration Date  09/18/2018 - 2328   09/15/18  01:28    








 Microbiology





09/11/18 17:52   Blood   Blood Culture Gram Stain - Final


09/11/18 17:52   Blood   Blood Culture - Final


                            Coagulase Negative Staph


09/11/18 16:52   Urine,Clean Catch   Urine Culture - Final


09/11/18 17:52   Blood   Blood Culture - Final











Assessment and Plan


(1) Diverticulitis


Current Visit: Yes   Status: Acute   Code(s): K57.92 - DVTRCLI OF INTEST, PART 

UNSP, W/O PERF OR ABSCESS W/O BLEED   SNOMED Code(s): 672855962


   





(2) GI bleed


Current Visit: Yes   Status: Acute   Code(s): K92.2 - GASTROINTESTINAL 

HEMORRHAGE, UNSPECIFIED   SNOMED Code(s): 33350657


   





(3) Gram-positive cocci bacteremia


Narrative/Plan: 


Pleasant 78-year-old  male presents to Hospital with gastrointestinal 

bleed and severe abdominal pain which was very concerning and consequently he 

presented to the emergency center.  Computed tomography scan was performed 

revealing evidence of phlegmon between the sigmoid colon and the bladder.  He's 

been seen by surgery and is being monitored at this point in time.  It is not 

thought that an abscesses yet formed and percutaneous drainage is not possible.

  The patient has not had ongoing gastrointestinal bleeding, hemodynamically 

stable and hemoglobin is stable.  He is feeling relatively well.  There is 

evidence of the positive blood culture which will determine our course of 

antibiotic therapy at discharge.  Currently Zosyn and vancomycin are being 

utilized.  If he improves we'll have to determine what her best possible 

options are as far as antibiotic therapy.  Patient may be an excellent 

candidate for outpatient intravenous antibiotic therapy for the treatment of 

this complex infection.  Goal will be to treat the phlegmon, stabilized the 

patient for outpatient endoscopy in the future to evaluate his need for any 

type of surgical intervention in the future.  The leukocytosis is directly 

related to his current diverticulitis and sepsis.





09/17/2018 patient is feeling better.  The blood cultures and is quite was 

negative staph and constantly vancomycin is discontinued.


Patient is definitely feeling better amount of gastric intestinal bleeding 

appears to have improved.  Hemoglobin is stable at 7.1 no plans for further 

transfusion.


Still has some leukocytosis, improved but not resolved it appears related to 

the current intra-abdominal infection with phlegmon related to his 

diverticulitis.  Fortunately is showing some improvements.


With a blood culture being a contamination we can ask for IV access to be 

placed in an plan for outpatient intravenous antibiotic therapy.





Current Visit: Yes   Status: Acute   Code(s): R78.81 - BACTEREMIA   SNOMED Code(

s): 106253630853

## 2018-09-17 NOTE — P.PN
Subjective


Progress Note Date: 09/17/18


This is a 78-year-old male patient of Dr. Kinney.  Patient presented to the 

hospital with feeling of increased weakness and dizziness.  Upon arrival to 

emergency room patient had 2 large bloody bowel movements.  Patient has known 

past medical history of CVA and hypertension.  Patient was given 1 unit of 

blood in the emergency room.  Patient was on Augmentin prior to hospitalization 

for dental work.  CT a of the head and neck completed showing no significant 

acute abnormality.  No significant change from recent CT neck study.  Chest x-

ray completed showing chronic changes and cardiomegaly without acute pulmonary 

process.  CT of head completed showing no acute intracranial hemorrhage or 

midline shift.  There is moderate diffuse age-related cerebral atrophy and 

chronic small vessel ischemic changes redemonstrated no significant change from 

recent CT.  CT of abdomen and pelvis completed showing compensated acute 

sigmoid diverticulitis, with a 8 x 7 x 5 cm Phlegmon juxtaposed between the 

sigmoid in the urinary bladder.  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 

consolidative obesity.  If no prior CTs available to ensure stability over time 

the instruments follow-up chest CT is recommended.  Dr. Larson per critical 

care and pulmonary services consulted.  Dr. Barrios per surgical service is 

consulted.  WBC upon arrival 20.1.  Patient currently on Zosyn for IV 

antibiotics.  Hemoglobin 10.7.  Patient did receive 1 unit of PRBCs.  Urine and 

blood cultures have been ordered.  At this time patient denies chest pain or 

shortness of breath.  Patient denies nausea vomiting or diarrhea.  Patient 

denies any urinary burning or frequency.  Abdominal mass felt in lower left 

quadrant.  Discussed case with Dr. Larson per critical care.  Dr. King per GI 

services have been consulted. Carcinoembryonic antigen ordered per Critical 

care.





On 09/13/2018 patient has been moved out of the intensive care unit.  Patient 

is currently alert and oriented 3 resting comfortably in bed denies any 

complaints at this time.  Patient does state he has had a bowel movement that 

appeared dark this a.m.  Patient denies any nausea or vomiting.  Patient denies 

chest pain or shortness of breath.  Denies any urinary burning or frequency.  

Patient is currently on a clear liquid diet per surgical services





On 09/14/2018 per nursing Patient had episode of tarry stool and hemoglobin 

dropped to 7.3.  Patient was symptomatic of low hemoglobin.  Patient received 1 

unit of PRBCs.  Per nursing staff patient will have EGD today.  White blood 

cell increasing to 18.0.  Dr. Santana for ID is following.  Patient maintained 

on vancomycin and Zosyn.





On 09/15/2018 patient is alert and oriented 3 in no apparent distress he 

denies any nausea or vomiting no abdominal pain at this time he had bloody 

bowel movements throughout the night and received 2 units of red blood cell 

transfusion he is feeling better since. He denies any chest pain or shortness 

of breath, no cough no nausea or vomiting and no urinary symptoms.





On 09/16/2018 patient was seen and examined, he is on telemetry floor out of 

the ICU, he denies any abdominal pain, he had 1 bloody bowel movement earlier 

today, otherwise he denies any symptoms there is no fever or chills no headache 

or dizziness no chest pain no shortness of breath no cough no nausea or 

vomiting no abdominal pain and no urinary symptoms.





09/17/2018 patient is currently alert and oriented 3.  Resting comfortably.  

Per nursing staff patient is having bowel movements with minimal blood.  

Patient denies any nausea.  Patient denies any Shortness breath or chest pain.  

Patient denies any urinary symptoms








Objective





- Vital Signs


Vital signs: 


 Vital Signs











Temp  97.7 F   09/17/18 08:20


 


Pulse  88   09/17/18 08:20


 


Resp  16   09/17/18 08:20


 


BP  139/77   09/17/18 08:20


 


Pulse Ox  99   09/17/18 08:20








 Intake & Output











 09/16/18 09/17/18 09/17/18





 18:59 06:59 18:59


 


Intake Total 5593 686 9974


 


Output Total 825 275 400


 


Balance 976 275 800


 


Weight  116.1 kg 


 


Intake:   


 


   500 800


 


    Sodium Chloride 0.9% 1, 400 500 800





    000 ml @ 100 mls/hr IV .   





    Q10H ZAINAB Rx#:[id number]   


 


    Vancomycin 1,750 mg In 501  





    Sodium Chloride 0.9% 500   





    ml @ 167 mls/hr IVPB Q16H   





    ZAINAB Rx#:[id number]   


 


  Intake, IV Titration  50 





  Amount   


 


    Piperacillin-Tazobactam 3  50 





    .375 gm In Dextrose/Water   





    1 50ml.bag @ 12.5 mls/hr   





    IVPB Q8H ZAINAB Rx#:   





    [id number]   


 


  Oral 900  400


 


Output:   


 


  Urine 825 275 400


 


Other:   


 


  Voiding Method Toilet Toilet Toilet





 Urinal Urinal Urinal


 


  # Voids 1  


 


  # Bowel Movements 1  














- Exam


Head normocephalic


Neck supple


Lungs clear to auscultation bilaterally no wheezing or crackles


Heart regular rate and rhythm S1-S2, no rub or gallop


Abdomen is soft nontender nondistended positive bowel sounds no 

hepatosplenomegaly.  Left lower quadrant abdominal mass


Extremities no edema


Neuro alert and orientated to 








- Labs


CBC & Chem 7: 


 09/17/18 06:41





 09/17/18 06:41


Labs: 


 Abnormal Lab Results - Last 24 Hours (Table)











  09/17/18 09/17/18 Range/Units





  06:41 06:41 


 


WBC   12.1 H  (3.8-10.6)  k/uL


 


RBC   2.30 L  (4.30-5.90)  m/uL


 


Hgb   7.1 L  (13.0-17.5)  gm/dL


 


Hct   21.6 L  (39.0-53.0)  %


 


Neutrophils #   9.6 H  (1.3-7.7)  k/uL


 


Chloride  117 H   ()  mmol/L


 


Creatinine  2.06 H   (0.66-1.25)  mg/dL


 


Calcium  8.1 L   (8.4-10.2)  mg/dL


 


Total Protein  4.9 L   (6.3-8.2)  g/dL


 


Albumin  2.1 L   (3.5-5.0)  g/dL














Assessment and Plan


Assessment: 


1 GI bleed secondary to acute sigmoid diverticulitis.  CT of the abdomen and 

pelvis completed showing complicated acute sigmoid diverticulitis with a 8 x 7 

x 5 CM Phlegmon juxtaposed between the sigmoid and the urinary bladder.  Dr. Barrios per surgical services consulted.  Patient currently nothing by mouth.  

Surgical services have been consulted.  Hemoglobin 10.7 patient did receive 1 

unit of PRBCs in emergency room.  Hemoglobin 9.3.  Per surgical services will 

treat patient conservatively now with IV antibiotic.  Patient had episode of 

tarry stools last night.  Patient received 1 unit of PRBCs for hemoglobin 7.3.  

Patient has received a  total of 4 units of packed red blood cells.  EGD 

performed on 9/14.  EGD showing no active bleeding visualized.  Small 

nonbleeding 6 mm antral polyp.  Mild antral gastritis and mild duodenitis.  

Hemoglobin 7.1.  Ferrous sulfate has been added





2. masslike collection within the abdomen measuring  8 x 7 x 5 cm in size in 

between the sigmoid and the urinary bladder.  GI services have been consulted 

per critical care and CEA ordered.  CEA level 1. per GI services patient will 

benefit from interventional radiology percutaneous drainage but will refer to 

general surgical services at this time.  Discussed case in depth labeled with 

GI service is planning for EGD and colonoscopy outpatient however timing will 

be decided on clinical course ideally would like to wait until chronic Phlegmon 

has improved with resolution of leukocytosis and fever.  WBC improving to 12.1





3. patchy opacity left lower lobe.  CTA completed showing left lower lobe 5 x 5 

x 3 cm pulmonary consolidative opacity.  Dr. Larson following for pulmonary 

services.





4. previous history of CVA





5. leukocytosis.  Patient did have elevated temperature on arrival to emergency 

room. Blood and urine cultures have been ordered.  Patient currently on IV 

Zosyn and vancomycin for antibiotics.  Dr. Santana per infectious disease has 

been consulted





6. syncope.  CT of head completed showing no acute intracranial hemorrhage or 

midline shift.  There is moderate diffuse age-related cerebral atrophy and 

chronic small vessel ischemic change redemonstrated.  No significant change 

from recent CT





7.  Recent dental work.  Patient was previously taking Augmentin at home.  CTA 

completed showing no significant acute abnormality.  No significant change from 

recent CT of neck study.





8.  Acute kidney injury.  Creatinine increasing to 1.46.  Normal Saline at 100.

  Continue to Monitor closely.  Creatinine increasing to 2.06





GI prophylaxis Protonix.  DVT prophylaxis SCDs





I performed an examination of the patient and discussed their management with 

the Nurse Practitioner.  I have reviewed the Nurse Practitioner's notes and 

agree with the documented findings and plan of care

## 2018-09-17 NOTE — P.PN
Progress Note - Text


Progress Note Date: 09/17/18





The patient is resting comfortably in his bed.  He denies any significant 

abdominal pain.  He's had no further rectal bleeding.  His hemoglobin 7.1.





On exam is lesser stable.  His abdomen is soft.





Diverticulitis with phlegmon related to sigmoid diverticulosis.  Patient has 

had no further evidence of GI bleed.  He'll continue receive IV antibiotic.  We'

ll advance his diet to regular diet.

## 2018-09-17 NOTE — P.PN
Subjective


Progress Note Date: 09/17/18


Principal diagnosis: 





Weakness, Abdominal pain, diverticulitis with abscess





Patient reports feeling better overall.  He is tolerating his diet to this 

point.  He is denying any abdominal pain.  He reports 2 bowel movements today 

which were nonbloody and non-melanotic





Objective





- Vital Signs


Vital signs: 


 Vital Signs











Temp  98.2 F   09/17/18 16:55


 


Pulse  81   09/17/18 16:55


 


Resp  16   09/17/18 16:55


 


BP  146/88   09/17/18 16:55


 


Pulse Ox  97   09/17/18 16:55








 Intake & Output











 09/16/18 09/17/18 09/17/18





 18:59 06:59 18:59


 


Intake Total 1593 595 6117


 


Output Total 825 275 575


 


Balance 976 275 625


 


Weight  116.1 kg 


 


Intake:   


 


   500 800


 


    Sodium Chloride 0.9% 1, 400 500 800





    000 ml @ 100 mls/hr IV .   





    Q10H ZAINAB Rx#:[id number]   


 


    Vancomycin 1,750 mg In 501  





    Sodium Chloride 0.9% 500   





    ml @ 167 mls/hr IVPB Q16H   





    ZAINAB Rx#:[id number]   


 


  Intake, IV Titration  50 





  Amount   


 


    Piperacillin-Tazobactam 3  50 





    .375 gm In Dextrose/Water   





    1 50ml.bag @ 12.5 mls/hr   





    IVPB Q8H ZAINAB Rx#:   





    [id number]   


 


  Oral 900  400


 


Output:   


 


  Urine 825 275 575


 


Other:   


 


  Voiding Method Toilet Toilet Toilet





 Urinal Urinal Urinal


 


  # Voids 1  1


 


  # Bowel Movements 1  














- Exam





On physical examination, patient appears comfortable in no apparent distress. 


HEAD: Normocephalic, atraumatic. 


EYES: No scleral icterus. No conjunctival injection. 


MOUTH: No lesions, tongue midline. 


NECK: Trachea midline, no gross abnormalities. 


CHEST: Clear to auscultation with no wheezing or rhonchi appreciated. 


HEART: Regular rate and rhythm. 


ABDOMEN: Soft, obese. Bowel sounds are positive. No organomegaly.  No guarding 

or rigidity.


EXTREMITIES: No pedal edema. 


SKIN: No rashes, no jaundice. 


NEUROLOGIC: Alert and oriented x3.  No focal deficits. 





- Labs


CBC & Chem 7: 


 09/17/18 06:41





 09/17/18 06:41


Labs: 


 Abnormal Lab Results - Last 24 Hours (Table)











  09/17/18 09/17/18 Range/Units





  06:41 06:41 


 


WBC   12.1 H  (3.8-10.6)  k/uL


 


RBC   2.30 L  (4.30-5.90)  m/uL


 


Hgb   7.1 L  (13.0-17.5)  gm/dL


 


Hct   21.6 L  (39.0-53.0)  %


 


Neutrophils #   9.6 H  (1.3-7.7)  k/uL


 


Chloride  117 H   ()  mmol/L


 


Creatinine  2.06 H   (0.66-1.25)  mg/dL


 


Calcium  8.1 L   (8.4-10.2)  mg/dL


 


Total Protein  4.9 L   (6.3-8.2)  g/dL


 


Albumin  2.1 L   (3.5-5.0)  g/dL














Assessment and Plan


(1) Acute blood loss anemia


Narrative/Plan: 


Patient presenting with dark stool and CT suggestive of diverticulitis with 

possible phlegmon.  EGD was performed with gastritis and duodenitis noted and a 

small gastric polyp removed.  There is no evidence of active GI bleed on EGD.  

The patient is doing well tolerating diet with no signs or symptoms of GI 

bleeding at this time.


Current Visit: Yes   Status: Acute   Code(s): D62 - ACUTE POSTHEMORRHAGIC 

ANEMIA   SNOMED Code(s): 602389527


   





(2) Diverticulitis


Narrative/Plan: 


Findings on CT suggestive of diverticulitis with possible phlegmon versus mass 

versus other etiology of collection.


Current Visit: Yes   Status: Acute   Code(s): K57.92 - DVTRCLI OF INTEST, PART 

UNSP, W/O PERF OR ABSCESS W/O BLEED   SNOMED Code(s): 725099068


   


Plan: 





Supportive care


Surgical recommendations appreciated


Diet advance today


Pathology from gastric polyp removal


Continue antibiotics, appreciate recommendations by infectious disease


Will need colonoscopy in the outpatient setting for evaluation of colon, this 

was discussed with the patient at length who promises to follow-up in the 

outpatient setting.


Thank you for allowing us to despite in the care of this patient, we will 

continue to follow

## 2018-09-18 LAB
ANION GAP SERPL CALC-SCNC: 7 MMOL/L
BASOPHILS # BLD AUTO: 0 K/UL (ref 0–0.2)
BASOPHILS NFR BLD AUTO: 0 %
BUN SERPL-SCNC: 13 MG/DL (ref 9–20)
CALCIUM SPEC-MCNC: 8 MG/DL (ref 8.4–10.2)
CHLORIDE SERPL-SCNC: 118 MMOL/L (ref 98–107)
CO2 SERPL-SCNC: 18 MMOL/L (ref 22–30)
EOSINOPHIL # BLD AUTO: 0.6 K/UL (ref 0–0.7)
EOSINOPHIL NFR BLD AUTO: 4 %
ERYTHROCYTE [DISTWIDTH] IN BLOOD BY AUTOMATED COUNT: 2.2 M/UL (ref 4.3–5.9)
ERYTHROCYTE [DISTWIDTH] IN BLOOD BY AUTOMATED COUNT: 2.33 M/UL (ref 4.3–5.9)
ERYTHROCYTE [DISTWIDTH] IN BLOOD BY AUTOMATED COUNT: 2.36 M/UL (ref 4.3–5.9)
ERYTHROCYTE [DISTWIDTH] IN BLOOD BY AUTOMATED COUNT: 2.55 M/UL (ref 4.3–5.9)
ERYTHROCYTE [DISTWIDTH] IN BLOOD: 15.7 % (ref 11.5–15.5)
ERYTHROCYTE [DISTWIDTH] IN BLOOD: 16 % (ref 11.5–15.5)
ERYTHROCYTE [DISTWIDTH] IN BLOOD: 16.2 % (ref 11.5–15.5)
ERYTHROCYTE [DISTWIDTH] IN BLOOD: 16.3 % (ref 11.5–15.5)
GLUCOSE BLD-MCNC: 103 MG/DL (ref 75–99)
GLUCOSE SERPL-MCNC: 90 MG/DL (ref 74–99)
HCT VFR BLD AUTO: 20.7 % (ref 39–53)
HCT VFR BLD AUTO: 21.4 % (ref 39–53)
HCT VFR BLD AUTO: 21.5 % (ref 39–53)
HCT VFR BLD AUTO: 23.5 % (ref 39–53)
HGB BLD-MCNC: 6.8 GM/DL (ref 13–17.5)
HGB BLD-MCNC: 7.1 GM/DL (ref 13–17.5)
HGB BLD-MCNC: 7.3 GM/DL (ref 13–17.5)
HGB BLD-MCNC: 8.1 GM/DL (ref 13–17.5)
LYMPHOCYTES # SPEC AUTO: 1.7 K/UL (ref 1–4.8)
LYMPHOCYTES NFR SPEC AUTO: 13 %
MCH RBC QN AUTO: 30.5 PG (ref 25–35)
MCH RBC QN AUTO: 30.9 PG (ref 25–35)
MCH RBC QN AUTO: 30.9 PG (ref 25–35)
MCH RBC QN AUTO: 31.9 PG (ref 25–35)
MCHC RBC AUTO-ENTMCNC: 32.9 G/DL (ref 31–37)
MCHC RBC AUTO-ENTMCNC: 33.2 G/DL (ref 31–37)
MCHC RBC AUTO-ENTMCNC: 33.9 G/DL (ref 31–37)
MCHC RBC AUTO-ENTMCNC: 34.6 G/DL (ref 31–37)
MCV RBC AUTO: 91.1 FL (ref 80–100)
MCV RBC AUTO: 92 FL (ref 80–100)
MCV RBC AUTO: 92.3 FL (ref 80–100)
MCV RBC AUTO: 93.8 FL (ref 80–100)
MONOCYTES # BLD AUTO: 0.8 K/UL (ref 0–1)
MONOCYTES NFR BLD AUTO: 6 %
NEUTROPHILS # BLD AUTO: 10.3 K/UL (ref 1.3–7.7)
NEUTROPHILS NFR BLD AUTO: 76 %
PLATELET # BLD AUTO: 238 K/UL (ref 150–450)
PLATELET # BLD AUTO: 260 K/UL (ref 150–450)
PLATELET # BLD AUTO: 270 K/UL (ref 150–450)
PLATELET # BLD AUTO: 314 K/UL (ref 150–450)
POTASSIUM SERPL-SCNC: 3.5 MMOL/L (ref 3.5–5.1)
SODIUM SERPL-SCNC: 143 MMOL/L (ref 137–145)
WBC # BLD AUTO: 13.1 K/UL (ref 3.8–10.6)
WBC # BLD AUTO: 13.2 K/UL (ref 3.8–10.6)
WBC # BLD AUTO: 13.5 K/UL (ref 3.8–10.6)
WBC # BLD AUTO: 16.5 K/UL (ref 3.8–10.6)

## 2018-09-18 RX ADMIN — DEXTROSE MONOHYDRATE SCH MLS/HR: 5 INJECTION, SOLUTION INTRAVENOUS at 12:16

## 2018-09-18 RX ADMIN — CEFAZOLIN SCH: 330 INJECTION, POWDER, FOR SOLUTION INTRAMUSCULAR; INTRAVENOUS at 00:28

## 2018-09-18 RX ADMIN — DEXTROSE MONOHYDRATE SCH: 5 INJECTION, SOLUTION INTRAVENOUS at 02:44

## 2018-09-18 RX ADMIN — ACETAMINOPHEN PRN MG: 325 TABLET, FILM COATED ORAL at 09:36

## 2018-09-18 RX ADMIN — CEFAZOLIN SCH: 330 INJECTION, POWDER, FOR SOLUTION INTRAMUSCULAR; INTRAVENOUS at 08:49

## 2018-09-18 RX ADMIN — CEFAZOLIN SCH MLS/HR: 330 INJECTION, POWDER, FOR SOLUTION INTRAMUSCULAR; INTRAVENOUS at 17:17

## 2018-09-18 RX ADMIN — DEXTROSE MONOHYDRATE SCH MLS/HR: 5 INJECTION, SOLUTION INTRAVENOUS at 20:05

## 2018-09-18 RX ADMIN — ATORVASTATIN CALCIUM SCH MG: 40 TABLET, FILM COATED ORAL at 20:05

## 2018-09-18 RX ADMIN — Medication SCH MG: at 08:25

## 2018-09-18 RX ADMIN — PANTOPRAZOLE SODIUM SCH MG: 40 INJECTION, POWDER, FOR SOLUTION INTRAVENOUS at 08:25

## 2018-09-18 RX ADMIN — Medication SCH MG: at 17:16

## 2018-09-18 RX ADMIN — PANTOPRAZOLE SODIUM SCH MG: 40 INJECTION, POWDER, FOR SOLUTION INTRAVENOUS at 20:05

## 2018-09-18 NOTE — P.PN
Subjective


Progress Note Date: 09/18/18





70-year-old male seen at the bedside alert and oriented 3.  Patient is aware 

the plan of care.  The GI service is planning for colonoscopy tomorrow.  

Patient reportedly had one episode last night of a dark brown colored stool.  

Patient states there's been no abdominal pain.  Hemoglobin did drop 6.8 to be 

seen 1 unit packed red blood cells.  Currently denying dizziness 

lightheadedness chest pain or shortness of breath





Objective





- Vital Signs


Vital signs: 


 Vital Signs











Temp  99.1 F   09/18/18 12:10


 


Pulse  92   09/18/18 12:10


 


Resp  18   09/18/18 12:10


 


BP  172/88   09/18/18 12:10


 


Pulse Ox  98   09/18/18 12:10








 Intake & Output











 09/17/18 09/18/18 09/18/18





 18:59 06:59 18:59


 


Intake Total 1680 310 


 


Output Total 575 250 


 


Balance 1105 60 


 


Weight  116.1 kg 


 


Intake:   


 


    


 


    Sodium Chloride 0.9% 1, 800  





    000 ml @ 100 mls/hr IV .   





    Q10H [facility] Rx#:[id number]   


 


  Oral 880  


 


  Blood Product  310 


 


    Rc As-1  Unit  310 





    [id number]   


 


Output:   


 


  Urine 575 250 


 


Other:   


 


  Voiding Method Toilet Toilet 





 Urinal Urinal 


 


  # Voids 1  














- Exam





Physical exam


Pleasant 78-year-old male sitting up in bed in no acute distress


Lungs adequate air movement bilaterally on room air


Heart S1-S2 audible


Abdomen soft reportedly had one dark brown stool last evening no bright red 

blood.  States abdominal pain resolved urinating no difficulty no nausea no 

vomiting


Extremities no edema noted.











- Labs


CBC & Chem 7: 


 09/18/18 08:14





 09/18/18 01:10


Labs: 


 Abnormal Lab Results - Last 24 Hours (Table)











  09/15/18 09/18/18 09/18/18 Range/Units





  01:28 01:02 01:10 


 


WBC     (3.8-10.6)  k/uL


 


RBC     (4.30-5.90)  m/uL


 


Hgb     (13.0-17.5)  gm/dL


 


Hct     (39.0-53.0)  %


 


RDW     (11.5-15.5)  %


 


Neutrophils #     (1.3-7.7)  k/uL


 


Chloride    118 H  ()  mmol/L


 


Carbon Dioxide    18 L  (22-30)  mmol/L


 


Creatinine    1.94 H  (0.66-1.25)  mg/dL


 


POC Glucose (mg/dL)   103 H   (75-99)  mg/dL


 


Calcium    8.0 L  (8.4-10.2)  mg/dL


 


Crossmatch  See Detail    














  09/18/18 09/18/18 Range/Units





  01:10 08:14 


 


WBC  13.5 H  13.1 H  (3.8-10.6)  k/uL


 


RBC  2.20 L  2.33 L  (4.30-5.90)  m/uL


 


Hgb  6.8 L*  7.1 L  (13.0-17.5)  gm/dL


 


Hct  20.7 L  21.4 L  (39.0-53.0)  %


 


RDW  16.0 H  15.7 H  (11.5-15.5)  %


 


Neutrophils #  10.3 H   (1.3-7.7)  k/uL


 


Chloride    ()  mmol/L


 


Carbon Dioxide    (22-30)  mmol/L


 


Creatinine    (0.66-1.25)  mg/dL


 


POC Glucose (mg/dL)    (75-99)  mg/dL


 


Calcium    (8.4-10.2)  mg/dL


 


Crossmatch    














Assessment and Plan


Assessment: 





Impression


Present on admission 2 large bloody stools suspect GI bleed due to acute 

sigmoid diverticulitis


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


Leukocytosis febrile present on admission


Masslike collection per computed tomography scan of the abdomen between sigmoid 

and urinary bladder noted


Patchy opacity left lower lobe


Computed tomography scan abdomen and pelvis show complicated acute sigmoid 

diverticulitis with [id number] phlegmon to the post between the sigmoid and the 

urinary bladder


Present on admission 2 large bloody stools 1 unit packed red blood cells given 

in the emergency room suspect GI bleed


Persistent fever with preliminary blood culture gram-positive cocci in clusters


Component of acute blood loss anemia








Plan


Colonoscopy is scheduled tomorrow by GI service


Will follow with you closely addressing surgical issues as they arise


DVT and GI prophylaxis




















The above impression and plan of care have been discussed and directed by 

signing physician. Samira Schaeffer nurse practitioner acting as scribe for signing 

physician.

## 2018-09-18 NOTE — P.PN
Subjective


Progress Note Date: 09/18/18


Principal diagnosis: 


Sepsis secondary to complicated sigmoid diverticulitis GI bleed anemia





Status post EGD no evidence of peptic ulcer disease.  Past 2 merlot colored 

bowel movements last night hemoglobin dropped to 6.8 received 1 unit of blood.  

Current hemoglobin 7.1.  Afebrile.  White count 13.1.  Denies abdominal pain.  

No nausea vomiting.








Objective





- Vital Signs


Vital signs: 


 Vital Signs











Temp  99.6 F   09/18/18 08:25


 


Pulse  86   09/18/18 08:25


 


Resp  18   09/18/18 08:25


 


BP  171/84   09/18/18 08:25


 


Pulse Ox  97   09/18/18 08:25








 Intake & Output











 09/17/18 09/18/18 09/18/18





 18:59 06:59 18:59


 


Intake Total 1680 310 


 


Output Total 575 250 


 


Balance 1105 60 


 


Weight  116.1 kg 


 


Intake:   


 


    


 


    Sodium Chloride 0.9% 1, 800  





    000 ml @ 100 mls/hr IV .   





    Q10H [facility] Rx#:[id number]   


 


  Oral 880  


 


  Blood Product  310 


 


    Rc As-1  Unit  310 





    [id number]   


 


Output:   


 


  Urine 575 250 


 


Other:   


 


  Voiding Method Toilet Toilet 





 Urinal Urinal 


 


  # Voids 1  














- Exam


General appearance: The patient is alert, oriented, in no acute distress.


HET: Head is normocephalic and atraumatic.  Pupils are equal and reactive.  

Oropharynx is clear without lesions.


Neck: Supple without lymphadenopathy.  Trachea midline.


Heart: S1 S2.  Regular rate and rhythm.


Lungs: No crackles or wheezes are heard.


Abdomen: Soft, nontender, nondistended with  bowel sounds.  No peritoneal 

signs.  Palpable infraumbilical mass nontender.


Extremities: Normal skin color and turgor.  No cyanosis, rash, ulceration, 

clubbing, or edema.  Radial and pedal pulses are 2/4 bilaterally.


Neurological: No focal deficits.  Strength and sensation are grossly intact.








- Labs


CBC & Chem 7: 


 09/18/18 08:14





 09/18/18 01:10


Labs: 


 Abnormal Lab Results - Last 24 Hours (Table)











  09/15/18 09/18/18 09/18/18 Range/Units





  01:28 01:02 01:10 


 


WBC     (3.8-10.6)  k/uL


 


RBC     (4.30-5.90)  m/uL


 


Hgb     (13.0-17.5)  gm/dL


 


Hct     (39.0-53.0)  %


 


RDW     (11.5-15.5)  %


 


Neutrophils #     (1.3-7.7)  k/uL


 


Chloride    118 H  ()  mmol/L


 


Carbon Dioxide    18 L  (22-30)  mmol/L


 


Creatinine    1.94 H  (0.66-1.25)  mg/dL


 


POC Glucose (mg/dL)   103 H   (75-99)  mg/dL


 


Calcium    8.0 L  (8.4-10.2)  mg/dL


 


Crossmatch  See Detail    














  09/18/18 09/18/18 Range/Units





  01:10 08:14 


 


WBC  13.5 H  13.1 H  (3.8-10.6)  k/uL


 


RBC  2.20 L  2.33 L  (4.30-5.90)  m/uL


 


Hgb  6.8 L*  7.1 L  (13.0-17.5)  gm/dL


 


Hct  20.7 L  21.4 L  (39.0-53.0)  %


 


RDW  16.0 H  15.7 H  (11.5-15.5)  %


 


Neutrophils #  10.3 H   (1.3-7.7)  k/uL


 


Chloride    ()  mmol/L


 


Carbon Dioxide    (22-30)  mmol/L


 


Creatinine    (0.66-1.25)  mg/dL


 


POC Glucose (mg/dL)    (75-99)  mg/dL


 


Calcium    (8.4-10.2)  mg/dL


 


Crossmatch    














Assessment and Plan


(1) Diverticulitis


Narrative/Plan: 


78-year-old gentleman admitted with sepsis with multiple constitutional 

symptoms such as fever, leukocytosis, weakness, fatigue, change in appetite, 

unintentional weight loss and darker colored bowel movements suggestive of 

acute GI bleed possibly colonic diverticular with CT abdominal imaging 

suggestive of acute complicated sigmoid diverticulitis with juxtaposed fluid 

phlegmon collection between the urinary bladder and colon. Underlying neoplasm 

cannot be entirely excluded. No history of colonoscopy screening.





Persistent fever T-max 101.0 with preliminary blood cultures gram-positive 

cocci in clusters.


Current Visit: Yes   Status: Acute   Code(s): K57.92 - DVTRCLI OF INTEST, PART 

UNSP, W/O PERF OR ABSCESS W/O BLEED   SNOMED Code(s): 528088526


   





(2) GI bleed


Narrative/Plan: 


PAssing merlot colored bowel movements. Status post EGD no evidence of peptic 

ulcer disease possible colonic diverticular bleed possible neoplasm possible 

focal colitis.


Current Visit: Yes   Status: Acute   Code(s): K92.2 - GASTROINTESTINAL 

HEMORRHAGE, UNSPECIFIED   SNOMED Code(s): 99080603


   


Plan: 


1.  Clear liquid diet.  CBC at noon and 1800.  Nothing by mouth after midnight.

  We'll proceed with colonoscopy screening tomorrow.





The gastroenterologist has discussed the risks, benefits and alternative 

therapies for the above-mentioned procedure and for both sedation/analgesia as 

well as necessary blood product administration, if indicated, as they pertain 

to this patient.  The patient has indicated understanding and acceptance of the 

risks and procedures discussed.





Assessment and plan of care discussed with Dr. Rod

## 2018-09-18 NOTE — P.PN
Subjective


Progress Note Date: 09/18/18


This is a 78-year-old male patient of Dr. Kinney.  Patient presented to the 

hospital with feeling of increased weakness and dizziness.  Upon arrival to 

emergency room patient had 2 large bloody bowel movements.  Patient has known 

past medical history of CVA and hypertension.  Patient was given 1 unit of 

blood in the emergency room.  Patient was on Augmentin prior to hospitalization 

for dental work.  CT a of the head and neck completed showing no significant 

acute abnormality.  No significant change from recent CT neck study.  Chest x-

ray completed showing chronic changes and cardiomegaly without acute pulmonary 

process.  CT of head completed showing no acute intracranial hemorrhage or 

midline shift.  There is moderate diffuse age-related cerebral atrophy and 

chronic small vessel ischemic changes redemonstrated no significant change from 

recent CT.  CT of abdomen and pelvis completed showing compensated acute 

sigmoid diverticulitis, with a 8 x 7 x 5 cm Phlegmon juxtaposed between the 

sigmoid in the urinary bladder.  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 

consolidative obesity.  If no prior CTs available to ensure stability over time 

the instruments follow-up chest CT is recommended.  Dr. Larson per critical 

care and pulmonary services consulted.  Dr. Barrios per surgical service is 

consulted.  WBC upon arrival 20.1.  Patient currently on Zosyn for IV 

antibiotics.  Hemoglobin 10.7.  Patient did receive 1 unit of PRBCs.  Urine and 

blood cultures have been ordered.  At this time patient denies chest pain or 

shortness of breath.  Patient denies nausea vomiting or diarrhea.  Patient 

denies any urinary burning or frequency.  Abdominal mass felt in lower left 

quadrant.  Discussed case with Dr. Larson per critical care.  Dr. King per GI 

services have been consulted. Carcinoembryonic antigen ordered per Critical 

care.





On 09/13/2018 patient has been moved out of the intensive care unit.  Patient 

is currently alert and oriented 3 resting comfortably in bed denies any 

complaints at this time.  Patient does state he has had a bowel movement that 

appeared dark this a.m.  Patient denies any nausea or vomiting.  Patient denies 

chest pain or shortness of breath.  Denies any urinary burning or frequency.  

Patient is currently on a clear liquid diet per surgical services





On 09/14/2018 per nursing Patient had episode of tarry stool and hemoglobin 

dropped to 7.3.  Patient was symptomatic of low hemoglobin.  Patient received 1 

unit of PRBCs.  Per nursing staff patient will have EGD today.  White blood 

cell increasing to 18.0.  Dr. Santana for ID is following.  Patient maintained 

on vancomycin and Zosyn.





On 09/15/2018 patient is alert and oriented 3 in no apparent distress he 

denies any nausea or vomiting no abdominal pain at this time he had bloody 

bowel movements throughout the night and received 2 units of red blood cell 

transfusion he is feeling better since. He denies any chest pain or shortness 

of breath, no cough no nausea or vomiting and no urinary symptoms.





On 09/16/2018 patient was seen and examined, he is on telemetry floor out of 

the ICU, he denies any abdominal pain, he had 1 bloody bowel movement earlier 

today, otherwise he denies any symptoms there is no fever or chills no headache 

or dizziness no chest pain no shortness of breath no cough no nausea or 

vomiting no abdominal pain and no urinary symptoms.





09/17/2018 patient is currently alert and oriented 3.  Resting comfortably.  

Per nursing staff patient is having bowel movements with minimal blood.  

Patient denies any nausea.  Patient denies any Shortness breath or chest pain.  

Patient denies any urinary symptoms





On 09/18/2018  patient is alert and orientated 3.  Per nursing staff patient 

had increased episodes of dark red bowel movements last night.  Hemoglobin 

dropping to 6.8.  Patient did receive 1 unit PRBCs.  Per GI services planning 

for colonoscopy tomorrow in a.m. at this time patient denies any abdominal 

pain.  Patient denies nausea vomiting or diarrhea.  Patient denies any urinary 

burning or frequency.  Patient denies any chest pain or shortness breath.  

Repeat hemoglobin 7.1








Objective





- Vital Signs


Vital signs: 


 Vital Signs











Temp  99.6 F   09/18/18 08:25


 


Pulse  86   09/18/18 08:25


 


Resp  18   09/18/18 08:25


 


BP  171/84   09/18/18 08:25


 


Pulse Ox  97   09/18/18 08:25








 Intake & Output











 09/17/18 09/18/18 09/18/18





 18:59 06:59 18:59


 


Intake Total 1680 310 


 


Output Total 575 250 


 


Balance 1105 60 


 


Weight  116.1 kg 


 


Intake:   


 


    


 


    Sodium Chloride 0.9% 1, 800  





    000 ml @ 100 mls/hr IV .   





    Q10H [facility] Rx#:[id number]   


 


  Oral 880  


 


  Blood Product  310 


 


    Rc As-1  Unit  310 





    [id number]   


 


Output:   


 


  Urine 575 250 


 


Other:   


 


  Voiding Method Toilet Toilet 





 Urinal Urinal 


 


  # Voids 1  














- Exam


Head normocephalic


Neck supple


Lungs clear to auscultation bilaterally no wheezing or crackles


Heart regular rate and rhythm S1-S2, no rub or gallop


Abdomen is soft nontender nondistended positive bowel sounds no 

hepatosplenomegaly.  Left lower quadrant abdominal mass


Extremities no edema


Neuro alert and orientated to 








- Labs


CBC & Chem 7: 


 09/18/18 08:14





 09/18/18 01:10


Labs: 


 Abnormal Lab Results - Last 24 Hours (Table)











  09/15/18 09/17/18 09/18/18 Range/Units





  01:28 06:41 01:02 


 


WBC     (3.8-10.6)  k/uL


 


RBC     (4.30-5.90)  m/uL


 


Hgb     (13.0-17.5)  gm/dL


 


Hct     (39.0-53.0)  %


 


RDW     (11.5-15.5)  %


 


Neutrophils #     (1.3-7.7)  k/uL


 


Chloride   117 H   ()  mmol/L


 


Carbon Dioxide     (22-30)  mmol/L


 


Creatinine   2.06 H   (0.66-1.25)  mg/dL


 


POC Glucose (mg/dL)    103 H  (75-99)  mg/dL


 


Calcium   8.1 L   (8.4-10.2)  mg/dL


 


Total Protein   4.9 L   (6.3-8.2)  g/dL


 


Albumin   2.1 L   (3.5-5.0)  g/dL


 


Crossmatch  See Detail    














  09/18/18 09/18/18 09/18/18 Range/Units





  01:10 01:10 08:14 


 


WBC   13.5 H  13.1 H  (3.8-10.6)  k/uL


 


RBC   2.20 L  2.33 L  (4.30-5.90)  m/uL


 


Hgb   6.8 L*  7.1 L  (13.0-17.5)  gm/dL


 


Hct   20.7 L  21.4 L  (39.0-53.0)  %


 


RDW   16.0 H  15.7 H  (11.5-15.5)  %


 


Neutrophils #   10.3 H   (1.3-7.7)  k/uL


 


Chloride  118 H    ()  mmol/L


 


Carbon Dioxide  18 L    (22-30)  mmol/L


 


Creatinine  1.94 H    (0.66-1.25)  mg/dL


 


POC Glucose (mg/dL)     (75-99)  mg/dL


 


Calcium  8.0 L    (8.4-10.2)  mg/dL


 


Total Protein     (6.3-8.2)  g/dL


 


Albumin     (3.5-5.0)  g/dL


 


Crossmatch     














Assessment and Plan


Assessment: 


1 GI bleed secondary to acute sigmoid diverticulitis.  CT of the abdomen and 

pelvis completed showing complicated acute sigmoid diverticulitis with a 8 x 7 

x 5 CM Phlegmon juxtaposed between the sigmoid and the urinary bladder.  Dr. Barrios per surgical services consulted.   EGD performed on 9/14.  EGD showing 

no active bleeding visualized.  Small nonbleeding 6 mm antral polyp.  Mild 

antral gastritis and mild duodenitis.  Ferrous sulfate has been added.  Patient 

having episodes of dark red bowel movements last night.  Hemoglobin dropping to 

6.8.  Patient did receive another unit of packed red blood cells.  Patient has 

received a total of 5 units PRBC cells.  Per GI services planning for 

colonoscopy in a.m.





2. masslike collection within the abdomen measuring  8 x 7 x 5 cm in size in 

between the sigmoid and the urinary bladder.  GI services have been consulted 

per critical care and CEA ordered.  CEA level 1. per GI services patient will 

benefit from interventional radiology percutaneous drainage but will refer to 

general surgical services at this time.  Discussed case in depth labeled with 

GI service is planning for EGD and colonoscopy outpatient however timing will 

be decided on clinical course ideally would like to wait until chronic Phlegmon 

has improved with resolution of leukocytosis and fever.  WBC 13.1





3. patchy opacity left lower lobe.  CTA completed showing left lower lobe 5 x 5 

x 3 cm pulmonary consolidative opacity.  Dr. Larson following for pulmonary 

services.





4. previous history of CVA





5. leukocytosis.  Patient did have elevated temperature on arrival to emergency 

room. Blood and urine cultures have been ordered.  Patient currently on IV 

Zosyn and vancomycin for antibiotics.  Dr. Santana per infectious disease has 

been consulted.  Blood culture showing coagulase-negative staph.  Patient 

remains on Zosyn for IV antibiotics.  Vancomycin has been discontinued per ID.





6. syncope.  CT of head completed showing no acute intracranial hemorrhage or 

midline shift.  There is moderate diffuse age-related cerebral atrophy and 

chronic small vessel ischemic change redemonstrated.  No significant change 

from recent CT





7.  Recent dental work.  Patient was previously taking Augmentin at home.  CTA 

completed showing no significant acute abnormality.  No significant change from 

recent CT of neck study.





8.  Acute kidney injury.  Creatinine increasing to 1.46.  Normal Saline at 100.

  Continue to Monitor closely.  Creatinine increasing to 2.06.  Niacin has been 

DC'd per infectious disease.  Creatinine improving to 1.94.  We'll continue to 

monitor closely





GI prophylaxis Protonix.  DVT prophylaxis SCDs





I performed an examination of the patient and discussed their management with 

the Nurse Practitioner.  I have reviewed the Nurse Practitioner's notes and 

agree with the documented findings and plan of care

## 2018-09-19 LAB
ANION GAP SERPL CALC-SCNC: 7 MMOL/L
BASOPHILS # BLD AUTO: 0 K/UL (ref 0–0.2)
BASOPHILS NFR BLD AUTO: 0 %
BUN SERPL-SCNC: 11 MG/DL (ref 9–20)
CALCIUM SPEC-MCNC: 8 MG/DL (ref 8.4–10.2)
CHLORIDE SERPL-SCNC: 117 MMOL/L (ref 98–107)
CO2 SERPL-SCNC: 21 MMOL/L (ref 22–30)
EOSINOPHIL # BLD AUTO: 0.5 K/UL (ref 0–0.7)
EOSINOPHIL NFR BLD AUTO: 4 %
ERYTHROCYTE [DISTWIDTH] IN BLOOD BY AUTOMATED COUNT: 2.16 M/UL (ref 4.3–5.9)
ERYTHROCYTE [DISTWIDTH] IN BLOOD: 16.8 % (ref 11.5–15.5)
GLUCOSE SERPL-MCNC: 75 MG/DL (ref 74–99)
HCT VFR BLD AUTO: 20 % (ref 39–53)
HGB BLD-MCNC: 6.8 GM/DL (ref 13–17.5)
LYMPHOCYTES # SPEC AUTO: 1.3 K/UL (ref 1–4.8)
LYMPHOCYTES NFR SPEC AUTO: 12 %
MCH RBC QN AUTO: 31.4 PG (ref 25–35)
MCHC RBC AUTO-ENTMCNC: 33.8 G/DL (ref 31–37)
MCV RBC AUTO: 92.8 FL (ref 80–100)
MONOCYTES # BLD AUTO: 0.7 K/UL (ref 0–1)
MONOCYTES NFR BLD AUTO: 7 %
NEUTROPHILS # BLD AUTO: 8.6 K/UL (ref 1.3–7.7)
NEUTROPHILS NFR BLD AUTO: 76 %
PLATELET # BLD AUTO: 255 K/UL (ref 150–450)
POTASSIUM SERPL-SCNC: 3.9 MMOL/L (ref 3.5–5.1)
SODIUM SERPL-SCNC: 145 MMOL/L (ref 137–145)
WBC # BLD AUTO: 11.4 K/UL (ref 3.8–10.6)

## 2018-09-19 PROCEDURE — 0DBH8ZZ EXCISION OF CECUM, VIA NATURAL OR ARTIFICIAL OPENING ENDOSCOPIC: ICD-10-PCS

## 2018-09-19 PROCEDURE — 0DBL8ZZ EXCISION OF TRANSVERSE COLON, VIA NATURAL OR ARTIFICIAL OPENING ENDOSCOPIC: ICD-10-PCS

## 2018-09-19 RX ADMIN — DEXTROSE MONOHYDRATE SCH MLS/HR: 5 INJECTION, SOLUTION INTRAVENOUS at 12:16

## 2018-09-19 RX ADMIN — DEXTROSE MONOHYDRATE SCH MLS/HR: 5 INJECTION, SOLUTION INTRAVENOUS at 19:52

## 2018-09-19 RX ADMIN — PANTOPRAZOLE SODIUM SCH MG: 40 INJECTION, POWDER, FOR SOLUTION INTRAVENOUS at 19:50

## 2018-09-19 RX ADMIN — Medication SCH: at 05:09

## 2018-09-19 RX ADMIN — ATORVASTATIN CALCIUM SCH MG: 40 TABLET, FILM COATED ORAL at 19:50

## 2018-09-19 RX ADMIN — CEFAZOLIN SCH MLS/HR: 330 INJECTION, POWDER, FOR SOLUTION INTRAMUSCULAR; INTRAVENOUS at 19:49

## 2018-09-19 RX ADMIN — CEFAZOLIN SCH: 330 INJECTION, POWDER, FOR SOLUTION INTRAMUSCULAR; INTRAVENOUS at 06:32

## 2018-09-19 RX ADMIN — Medication SCH MG: at 19:50

## 2018-09-19 RX ADMIN — PANTOPRAZOLE SODIUM SCH MG: 40 INJECTION, POWDER, FOR SOLUTION INTRAVENOUS at 07:56

## 2018-09-19 RX ADMIN — DEXTROSE MONOHYDRATE SCH MLS/HR: 5 INJECTION, SOLUTION INTRAVENOUS at 03:00

## 2018-09-19 NOTE — P.PN
Subjective


Progress Note Date: 09/19/18


This is a 78-year-old male patient of Dr. Kinney.  Patient presented to the 

hospital with feeling of increased weakness and dizziness.  Upon arrival to 

emergency room patient had 2 large bloody bowel movements.  Patient has known 

past medical history of CVA and hypertension.  Patient was given 1 unit of 

blood in the emergency room.  Patient was on Augmentin prior to hospitalization 

for dental work.  CT a of the head and neck completed showing no significant 

acute abnormality.  No significant change from recent CT neck study.  Chest x-

ray completed showing chronic changes and cardiomegaly without acute pulmonary 

process.  CT of head completed showing no acute intracranial hemorrhage or 

midline shift.  There is moderate diffuse age-related cerebral atrophy and 

chronic small vessel ischemic changes redemonstrated no significant change from 

recent CT.  CT of abdomen and pelvis completed showing compensated acute 

sigmoid diverticulitis, with a 8 x 7 x 5 cm Phlegmon juxtaposed between the 

sigmoid in the urinary bladder.  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 

consolidative obesity.  If no prior CTs available to ensure stability over time 

the instruments follow-up chest CT is recommended.  Dr. Larson per critical 

care and pulmonary services consulted.  Dr. Barrios per surgical service is 

consulted.  WBC upon arrival 20.1.  Patient currently on Zosyn for IV 

antibiotics.  Hemoglobin 10.7.  Patient did receive 1 unit of PRBCs.  Urine and 

blood cultures have been ordered.  At this time patient denies chest pain or 

shortness of breath.  Patient denies nausea vomiting or diarrhea.  Patient 

denies any urinary burning or frequency.  Abdominal mass felt in lower left 

quadrant.  Discussed case with Dr. Larson per critical care.  Dr. King per GI 

services have been consulted. Carcinoembryonic antigen ordered per Critical 

care.





On 09/13/2018 patient has been moved out of the intensive care unit.  Patient 

is currently alert and oriented 3 resting comfortably in bed denies any 

complaints at this time.  Patient does state he has had a bowel movement that 

appeared dark this a.m.  Patient denies any nausea or vomiting.  Patient denies 

chest pain or shortness of breath.  Denies any urinary burning or frequency.  

Patient is currently on a clear liquid diet per surgical services





On 09/14/2018 per nursing Patient had episode of tarry stool and hemoglobin 

dropped to 7.3.  Patient was symptomatic of low hemoglobin.  Patient received 1 

unit of PRBCs.  Per nursing staff patient will have EGD today.  White blood 

cell increasing to 18.0.  Dr. Santana for ID is following.  Patient maintained 

on vancomycin and Zosyn.





On 09/15/2018 patient is alert and oriented 3 in no apparent distress he 

denies any nausea or vomiting no abdominal pain at this time he had bloody 

bowel movements throughout the night and received 2 units of red blood cell 

transfusion he is feeling better since. He denies any chest pain or shortness 

of breath, no cough no nausea or vomiting and no urinary symptoms.





On 09/16/2018 patient was seen and examined, he is on telemetry floor out of 

the ICU, he denies any abdominal pain, he had 1 bloody bowel movement earlier 

today, otherwise he denies any symptoms there is no fever or chills no headache 

or dizziness no chest pain no shortness of breath no cough no nausea or 

vomiting no abdominal pain and no urinary symptoms.





09/17/2018 patient is currently alert and oriented 3.  Resting comfortably.  

Per nursing staff patient is having bowel movements with minimal blood.  

Patient denies any nausea.  Patient denies any Shortness breath or chest pain.  

Patient denies any urinary symptoms





On 09/18/2018  patient is alert and orientated 3.  Per nursing staff patient 

had increased episodes of dark red bowel movements last night.  Hemoglobin 

dropping to 6.8.  Patient did receive 1 unit PRBCs.  Per GI services planning 

for colonoscopy tomorrow in a.m. at this time patient denies any abdominal 

pain.  Patient denies nausea vomiting or diarrhea.  Patient denies any urinary 

burning or frequency.  Patient denies any chest pain or shortness breath.  

Repeat hemoglobin 7.1





09/19/2018 patient is alert and oriented 3.  Patient point for colonoscopy 

today with Dr. Rod.  Hemoglobin 6.8.  Patient received 1 unit PRBCs.  

Patient denies chest pain or shortness breath.  Denies any nausea vomiting or 

diarrhea.  Patient denies any urinary burning or frequency.








Objective





- Vital Signs


Vital signs: 


 Vital Signs











Temp  98.3 F   09/19/18 08:00


 


Pulse  83   09/19/18 08:00


 


Resp  16   09/19/18 08:00


 


BP  156/84   09/19/18 08:00


 


Pulse Ox  95   09/19/18 08:00








 Intake & Output











 09/18/18 09/19/18 09/19/18





 18:59 06:59 18:59


 


Intake Total 1000 1100 300


 


Output Total  750 


 


Balance 1000 350 300


 


Weight  114.6 kg 


 


Intake:   


 


   1100 300


 


    Piperacillin-Tazobactam 3  100 





    .375 gm In Dextrose/Water   





    1 50ml.bag @ 12.5 mls/hr   





    IVPB Q8H [facility] Rx#:   





    [id number]   


 


    Sodium Chloride 0.9% 1, 800 1000 





    000 ml @ 100 mls/hr IV .   





    Q10H [facility] Rx#:[id number]   


 


  Oral 200 0 


 


Output:   


 


  Urine  450 


 


  Stool  300 


 


Other:   


 


  Voiding Method  Toilet Toilet





  Urinal Urinal


 


  # Voids 300  


 


  # Bowel Movements 1 5 














- Exam


Head normocephalic


Neck supple


Lungs clear to auscultation bilaterally no wheezing or crackles


Heart regular rate and rhythm S1-S2, no rub or gallop


Abdomen is soft nontender nondistended positive bowel sounds no 

hepatosplenomegaly.  Left lower quadrant abdominal mass


Extremities no edema


Neuro alert and orientated to 








- Labs


CBC & Chem 7: 


 09/19/18 07:20





 09/19/18 07:20


Labs: 


 Abnormal Lab Results - Last 24 Hours (Table)











  09/18/18 09/18/18 09/19/18 Range/Units





  14:44 20:35 07:20 


 


WBC  16.5 H  13.2 H   (3.8-10.6)  k/uL


 


RBC  2.55 L  2.36 L   (4.30-5.90)  m/uL


 


Hgb  8.1 L  7.3 L   (13.0-17.5)  gm/dL


 


Hct  23.5 L  21.5 L   (39.0-53.0)  %


 


RDW  16.3 H  16.2 H   (11.5-15.5)  %


 


Neutrophils #     (1.3-7.7)  k/uL


 


Chloride    117 H  ()  mmol/L


 


Carbon Dioxide    21 L  (22-30)  mmol/L


 


Creatinine    1.90 H  (0.66-1.25)  mg/dL


 


Calcium    8.0 L  (8.4-10.2)  mg/dL














  09/19/18 Range/Units





  07:20 


 


WBC  11.4 H  (3.8-10.6)  k/uL


 


RBC  2.16 L  (4.30-5.90)  m/uL


 


Hgb  6.8 L*  (13.0-17.5)  gm/dL


 


Hct  20.0 L  (39.0-53.0)  %


 


RDW  16.8 H  (11.5-15.5)  %


 


Neutrophils #  8.6 H  (1.3-7.7)  k/uL


 


Chloride   ()  mmol/L


 


Carbon Dioxide   (22-30)  mmol/L


 


Creatinine   (0.66-1.25)  mg/dL


 


Calcium   (8.4-10.2)  mg/dL














Assessment and Plan


Assessment: 


1 GI bleed secondary to acute sigmoid diverticulitis.  CT of the abdomen and 

pelvis completed showing complicated acute sigmoid diverticulitis with a 8 x 7 

x 5 CM Phlegmon juxtaposed between the sigmoid and the urinary bladder.  Dr. Barrios per surgical services consulted.   EGD performed on 9/14.  EGD showing 

no active bleeding visualized.  Small nonbleeding 6 mm antral polyp.  Mild 

antral gastritis and mild duodenitis.  Ferrous sulfate has been added.  Patient 

having episodes of dark red bowel movements last night.  Hemoglobin dropping to 

6.8.  Patient did receive another unit of packed red blood cells.  Patient has 

received a total of 5 units PRBC cells.  Per GI services planning for 

colonoscopy this a.m. 11/06/2020





2. masslike collection within the abdomen measuring  8 x 7 x 5 cm in size in 

between the sigmoid and the urinary bladder.  GI services have been consulted 

per critical care and CEA ordered.  CEA level 1. per GI services patient will 

benefit from interventional radiology percutaneous drainage but will refer to 

general surgical services at this time.  WBC 11.4.  Patient does have midline 

in place.  Patient will be discharged home on Invanz antibiotic per ID





3. patchy opacity left lower lobe.  CTA completed showing left lower lobe 5 x 5 

x 3 cm pulmonary consolidative opacity.  Dr. Larson following for pulmonary 

services.  Per pulmonary services patient does not show any signs or symptoms 

of pneumonia at this point.





4. previous history of CVA





5. leukocytosis.  Patient did have elevated temperature on arrival to emergency 

room. Blood and urine cultures have been ordered.  Patient currently on IV 

Zosyn and vancomycin for antibiotics.  Dr. Santana per infectious disease has 

been consulted.  Blood culture showing coagulase-negative staph.  Patient 

remains on Zosyn for IV antibiotics.  Vancomycin has been discontinued per ID.





6. syncope.  CT of head completed showing no acute intracranial hemorrhage or 

midline shift.  There is moderate diffuse age-related cerebral atrophy and 

chronic small vessel ischemic change redemonstrated.  No significant change 

from recent CT





7.  Recent dental work.  Patient was previously taking Augmentin at home.  CTA 

completed showing no significant acute abnormality.  No significant change from 

recent CT of neck study.





8.  Acute kidney injury.  Creatinine increasing to 1.46.  Normal Saline at 100.

  Continue to Monitor closely.  Creatinine increasing to 2.06.  Niacin has been 

DC'd per infectious disease.  Creatinine improving to 1.90.  We'll continue to 

monitor closely





GI prophylaxis Protonix.  DVT prophylaxis SCDs





I performed an examination of the patient and discussed their management with 

the Nurse Practitioner.  I have reviewed the Nurse Practitioner's notes and 

agree with the documented findings and plan of care

## 2018-09-19 NOTE — P.PCN
Date of Procedure: 09/19/18


Description of Procedure: 





Date of Procedure:


9/19/2018





Preoperative Diagnosis: Hematochezia, anemia of acute blood loss, diverticulitis

, Abnormal CT scan abdomen








Postoperative Diagnosis: Cecal polypectomy with cold snare, transverse colon 

polypectomy with cold snare in piecemeal fashion, diverticulosis of the sigmoid 

and descending colon, internal hemorrhoids, poor prep








BRIEF HISTORY: Patient is a pleasant 78-year-old male who initially presented 

to the hospital with complaints of feeling weak and with dark bowel movements. 

He was subsequently found to be anemic and imaging on presentation was 

suggestive of a sigmoid diverticulitis with a large abscess/phlegmon with the 

possibility of underlying mass not ruled out. The patient was treated medically 

with antibiotics as the location of the phlegmon did not allow for it to be 

drained by IR service. During the patient's hospitalization his hemoglobin 

continues to fall and he reported bright red blood per rectum, at which time 

the decision to take the patient for colonoscopy was made.








PROCEDURE PERFORMED: Colonoscopy with polypectomy.





ANESTHESIA:IV sedation per Anesthesia





PROCEDURE: After informed consent was obtained, the patient, was brought into 

the endoscopy unit. IV conscious sedation was administered by Anesthesia under 

continuous monitoring. Initially the Olympus CF-190 flexible video colonoscope 

was then inserted into the rectum, gradually advanced into the cecum without 

any difficulty. Careful examination was performed as the scope was gradually 

being withdrawn. Ileocecal valve and the appendiceal orifice were visualized 

and appeared normal. Prep was poor with dark liquid stool throughout the colon. 

In the cecum a 7 mm polyp was seen and resected with cold snare polypectomy. A 

10 mm transverse colon polyp was resected with cold snare in piecemeal fashion 

with 2 pieces retrieved and sent for pathology. The patient had many large and 

small diverticula in the sigmoid and descending colon. No blood was seen 

throughout the colon. No mass was seen in the area of the sigmoid. Mucosa of 

the cecum, ascending colon, transverse colon, descending colon, sigmoid colon, 

and rectum otherwise appeared normal. Retroflexion was performed in the rectum 

and no lesions were seen, the patient did have moderate nonbleeding 

hemorrhoids. The patient tolerated the procedure well.








RECOMMENDATIONS: Findings of the examination were discussed with the patient. 

Okay to restart full liquid diet today. Advance to GI soft tomorrow. Continue 

to monitor hemoglobin and hematocrit and transfuse as needed. Continue 

antibiotics per the infectious disease service. The patient will need a repeat 

colonoscopy in 6-12 months given piecemeal resection of the transverse colon 

polyp as well as the poor prep.








AUTHOR: Juarez Rod MD

## 2018-09-19 NOTE — P.PN
Subjective


Progress Note Date: 09/19/18





78-year-old male just returned from having the colonoscopy by GI service.  

Reviewed the report showed


Cecal polypectomy with cold snare, transverse colon polypectomy with cold snare 

in piecemeal fashion, diverticulosis of the sigmoid and descending colon, 

internal hemorrhoids, poor prep


Noted the hemoglobin this morning 6.8 currently denying dizziness 

lightheadedness shortness of breath or did.  Did note GIs recommendations.  

Okay to restart a full liquid diet today and advance to GI soft tomorrow 

continue to monitor hemoglobin and transfuse as needed continue the antibiotics 

per infectious disease.  Patient will need a repeat colonoscopy in 6-12 months 

given the piecemeal resection of the transverse colon polyp as well as the poor 

prep





Objective





- Vital Signs


Vital signs: 


 Vital Signs











Temp  97.5 F L  09/19/18 12:00


 


Pulse  76   09/19/18 12:00


 


Resp  16   09/19/18 12:00


 


BP  153/80   09/19/18 12:00


 


Pulse Ox  98   09/19/18 12:00








 Intake & Output











 09/18/18 09/19/18 09/19/18





 18:59 06:59 18:59


 


Intake Total 1000 1100 300


 


Output Total  750 


 


Balance 1000 350 300


 


Weight  114.6 kg 


 


Intake:   


 


   1100 300


 


    Piperacillin-Tazobactam 3  100 





    .375 gm In Dextrose/Water   





    1 50ml.bag @ 12.5 mls/hr   





    IVPB Q8H [facility] Rx#:   





    [id number]   


 


    Sodium Chloride 0.9% 1, 800 1000 





    000 ml @ 100 mls/hr IV .   





    Q10H [facility] Rx#:[id number]   


 


  Oral 200 0 


 


Output:   


 


  Urine  450 


 


  Stool  300 


 


Other:   


 


  Voiding Method  Toilet Toilet





  Urinal Urinal


 


  # Voids 300  


 


  # Bowel Movements 1 5 














- Exam





Physical exam


Pleasant 78-year-old male sitting up in bed in no acute distress just returned 

from having the colonoscopy denying any abdominal pain nausea vomiting


Lungs adequate air movement bilaterally on room air


Heart S1-S2 audible denying chest pain


Abdomen soft not distended nontender.  States abdominal pain resolved urinating 

no difficulty no nausea no vomiting


Extremities no edema noted.











- Labs


CBC & Chem 7: 


 09/19/18 07:20





 09/19/18 07:20


Labs: 


 Abnormal Lab Results - Last 24 Hours (Table)











  09/18/18 09/18/18 09/19/18 Range/Units





  14:44 20:35 07:20 


 


WBC  16.5 H  13.2 H   (3.8-10.6)  k/uL


 


RBC  2.55 L  2.36 L   (4.30-5.90)  m/uL


 


Hgb  8.1 L  7.3 L   (13.0-17.5)  gm/dL


 


Hct  23.5 L  21.5 L   (39.0-53.0)  %


 


RDW  16.3 H  16.2 H   (11.5-15.5)  %


 


Neutrophils #     (1.3-7.7)  k/uL


 


Chloride    117 H  ()  mmol/L


 


Carbon Dioxide    21 L  (22-30)  mmol/L


 


Creatinine    1.90 H  (0.66-1.25)  mg/dL


 


Calcium    8.0 L  (8.4-10.2)  mg/dL


 


Crossmatch     














  09/19/18 09/19/18 Range/Units





  07:20 11:17 


 


WBC  11.4 H   (3.8-10.6)  k/uL


 


RBC  2.16 L   (4.30-5.90)  m/uL


 


Hgb  6.8 L*   (13.0-17.5)  gm/dL


 


Hct  20.0 L   (39.0-53.0)  %


 


RDW  16.8 H   (11.5-15.5)  %


 


Neutrophils #  8.6 H   (1.3-7.7)  k/uL


 


Chloride    ()  mmol/L


 


Carbon Dioxide    (22-30)  mmol/L


 


Creatinine    (0.66-1.25)  mg/dL


 


Calcium    (8.4-10.2)  mg/dL


 


Crossmatch   See Detail  














Assessment and Plan


Assessment: 





Impression


Present on admission 2 large bloody stools suspect GI bleed due to acute 

sigmoid diverticulitis


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


Leukocytosis febrile present on admission


Masslike collection per computed tomography scan of the abdomen between sigmoid 

and urinary bladder noted


Patchy opacity left lower lobe


Computed tomography scan abdomen and pelvis show complicated acute sigmoid 

diverticulitis with [id number] phlegmon to the post between the sigmoid and the 

urinary bladder


Present on admission 2 large bloody stools 1 unit packed red blood cells given 

in the emergency room suspect GI bleed


Persistent fever with preliminary blood culture gram-positive cocci in clusters


Component of acute blood loss anemia


Status post colonoscopy done 19th of September Cecal polypectomy with cold snare

, transverse colon polypectomy with cold snare in piecemeal fashion, 

diverticulosis of the sigmoid and descending colon, internal hemorrhoids, poor 

prep








Plan


Continue recommendations by GI service defer to


Per GIs recommendations repeat colonoscopy in 6-12 months given piecemeal 

resection of the transverse colon polyp as well as poor prep


Continue to monitor hemoglobin and transfuse as needed


Per GI okay to restart full liquid diet today and advance to GI soft tomorrow


Will follow with you closely addressing surgical issues as they arise


DVT and GI prophylaxis




















The above impression and plan of care have been discussed and directed by 

signing physician. Samira Schaeffer nurse practitioner acting as scribe for signing 

physician.

## 2018-09-20 LAB
ALBUMIN SERPL-MCNC: 2.2 G/DL (ref 3.5–5)
ALP SERPL-CCNC: 71 U/L (ref 38–126)
ALT SERPL-CCNC: 24 U/L (ref 21–72)
ANION GAP SERPL CALC-SCNC: 5 MMOL/L
AST SERPL-CCNC: 20 U/L (ref 17–59)
BASOPHILS # BLD AUTO: 0 K/UL (ref 0–0.2)
BASOPHILS NFR BLD AUTO: 0 %
BUN SERPL-SCNC: 11 MG/DL (ref 9–20)
CALCIUM SPEC-MCNC: 8.1 MG/DL (ref 8.4–10.2)
CHLORIDE SERPL-SCNC: 115 MMOL/L (ref 98–107)
CO2 SERPL-SCNC: 23 MMOL/L (ref 22–30)
EOSINOPHIL # BLD AUTO: 0.4 K/UL (ref 0–0.7)
EOSINOPHIL NFR BLD AUTO: 4 %
ERYTHROCYTE [DISTWIDTH] IN BLOOD BY AUTOMATED COUNT: 2.21 M/UL (ref 4.3–5.9)
ERYTHROCYTE [DISTWIDTH] IN BLOOD: 16.5 % (ref 11.5–15.5)
GLUCOSE SERPL-MCNC: 107 MG/DL (ref 74–99)
HCT VFR BLD AUTO: 20.7 % (ref 39–53)
HGB BLD-MCNC: 6.8 GM/DL (ref 13–17.5)
LYMPHOCYTES # SPEC AUTO: 1.4 K/UL (ref 1–4.8)
LYMPHOCYTES NFR SPEC AUTO: 13 %
MCH RBC QN AUTO: 30.9 PG (ref 25–35)
MCHC RBC AUTO-ENTMCNC: 33 G/DL (ref 31–37)
MCV RBC AUTO: 93.5 FL (ref 80–100)
MONOCYTES # BLD AUTO: 0.6 K/UL (ref 0–1)
MONOCYTES NFR BLD AUTO: 6 %
NEUTROPHILS # BLD AUTO: 8.2 K/UL (ref 1.3–7.7)
NEUTROPHILS NFR BLD AUTO: 75 %
PLATELET # BLD AUTO: 241 K/UL (ref 150–450)
POTASSIUM SERPL-SCNC: 3.7 MMOL/L (ref 3.5–5.1)
PROT SERPL-MCNC: 4.9 G/DL (ref 6.3–8.2)
SODIUM SERPL-SCNC: 143 MMOL/L (ref 137–145)
WBC # BLD AUTO: 10.9 K/UL (ref 3.8–10.6)

## 2018-09-20 RX ADMIN — ERTAPENEM SODIUM SCH MLS/HR: 1 INJECTION, POWDER, LYOPHILIZED, FOR SOLUTION INTRAMUSCULAR; INTRAVENOUS at 07:47

## 2018-09-20 RX ADMIN — CEFAZOLIN SCH MLS/HR: 330 INJECTION, POWDER, FOR SOLUTION INTRAMUSCULAR; INTRAVENOUS at 06:36

## 2018-09-20 RX ADMIN — Medication SCH MG: at 06:36

## 2018-09-20 RX ADMIN — Medication SCH MG: at 17:32

## 2018-09-20 RX ADMIN — PANTOPRAZOLE SODIUM SCH MG: 40 INJECTION, POWDER, FOR SOLUTION INTRAVENOUS at 21:05

## 2018-09-20 RX ADMIN — PANTOPRAZOLE SODIUM SCH MG: 40 INJECTION, POWDER, FOR SOLUTION INTRAVENOUS at 07:47

## 2018-09-20 RX ADMIN — ATORVASTATIN CALCIUM SCH MG: 40 TABLET, FILM COATED ORAL at 21:05

## 2018-09-20 RX ADMIN — CEFAZOLIN SCH MLS/HR: 330 INJECTION, POWDER, FOR SOLUTION INTRAMUSCULAR; INTRAVENOUS at 07:47

## 2018-09-20 NOTE — P.PN
Subjective


Progress Note Date: 09/20/18


This is a 78-year-old male patient of Dr. Kinney.  Patient presented to the 

hospital with feeling of increased weakness and dizziness.  Upon arrival to 

emergency room patient had 2 large bloody bowel movements.  Patient has known 

past medical history of CVA and hypertension.  Patient was given 1 unit of 

blood in the emergency room.  Patient was on Augmentin prior to hospitalization 

for dental work.  CT a of the head and neck completed showing no significant 

acute abnormality.  No significant change from recent CT neck study.  Chest x-

ray completed showing chronic changes and cardiomegaly without acute pulmonary 

process.  CT of head completed showing no acute intracranial hemorrhage or 

midline shift.  There is moderate diffuse age-related cerebral atrophy and 

chronic small vessel ischemic changes redemonstrated no significant change from 

recent CT.  CT of abdomen and pelvis completed showing compensated acute 

sigmoid diverticulitis, with a 8 x 7 x 5 cm Phlegmon juxtaposed between the 

sigmoid in the urinary bladder.  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 

consolidative obesity.  If no prior CTs available to ensure stability over time 

the instruments follow-up chest CT is recommended.  Dr. Larson per critical 

care and pulmonary services consulted.  Dr. Barrios per surgical service is 

consulted.  WBC upon arrival 20.1.  Patient currently on Zosyn for IV 

antibiotics.  Hemoglobin 10.7.  Patient did receive 1 unit of PRBCs.  Urine and 

blood cultures have been ordered.  At this time patient denies chest pain or 

shortness of breath.  Patient denies nausea vomiting or diarrhea.  Patient 

denies any urinary burning or frequency.  Abdominal mass felt in lower left 

quadrant.  Discussed case with Dr. Larson per critical care.  Dr. King per GI 

services have been consulted. Carcinoembryonic antigen ordered per Critical 

care.





On 09/13/2018 patient has been moved out of the intensive care unit.  Patient 

is currently alert and oriented 3 resting comfortably in bed denies any 

complaints at this time.  Patient does state he has had a bowel movement that 

appeared dark this a.m.  Patient denies any nausea or vomiting.  Patient denies 

chest pain or shortness of breath.  Denies any urinary burning or frequency.  

Patient is currently on a clear liquid diet per surgical services





On 09/14/2018 per nursing Patient had episode of tarry stool and hemoglobin 

dropped to 7.3.  Patient was symptomatic of low hemoglobin.  Patient received 1 

unit of PRBCs.  Per nursing staff patient will have EGD today.  White blood 

cell increasing to 18.0.  Dr. Santana for ID is following.  Patient maintained 

on vancomycin and Zosyn.





On 09/15/2018 patient is alert and oriented 3 in no apparent distress he 

denies any nausea or vomiting no abdominal pain at this time he had bloody 

bowel movements throughout the night and received 2 units of red blood cell 

transfusion he is feeling better since. He denies any chest pain or shortness 

of breath, no cough no nausea or vomiting and no urinary symptoms.





On 09/16/2018 patient was seen and examined, he is on telemetry floor out of 

the ICU, he denies any abdominal pain, he had 1 bloody bowel movement earlier 

today, otherwise he denies any symptoms there is no fever or chills no headache 

or dizziness no chest pain no shortness of breath no cough no nausea or 

vomiting no abdominal pain and no urinary symptoms.





09/17/2018 patient is currently alert and oriented 3.  Resting comfortably.  

Per nursing staff patient is having bowel movements with minimal blood.  

Patient denies any nausea.  Patient denies any Shortness breath or chest pain.  

Patient denies any urinary symptoms





On 09/18/2018  patient is alert and orientated 3.  Per nursing staff patient 

had increased episodes of dark red bowel movements last night.  Hemoglobin 

dropping to 6.8.  Patient did receive 1 unit PRBCs.  Per GI services planning 

for colonoscopy tomorrow in a.m. at this time patient denies any abdominal 

pain.  Patient denies nausea vomiting or diarrhea.  Patient denies any urinary 

burning or frequency.  Patient denies any chest pain or shortness breath.  

Repeat hemoglobin 7.1





09/19/2018 patient is alert and oriented 3.  Patient going for colonoscopy 

today with Dr. Rod.  Hemoglobin 6.8.  Patient received 1 unit PRBCs.  

Patient denies chest pain or shortness breath.  Denies any nausea vomiting or 

diarrhea.  Patient denies any urinary burning or frequency.





On 09/20/2018 patient remains alert and oriented 3.  Patient did have 

colonoscopy yesterday with Dr. Rod.  Patient did have episode of maroon 

stool last. hemoglobin  this morning 6.8 patient will receive an additional 

unit of PRBCs today.  Patient remains asymptomatic denies nausea vomiting or 

diarrhea.  Denies any abdominal pain.  Patient denies chest pain or shortness 

breath.  Patient denies any urinary burning or frequency








Objective





- Vital Signs


Vital signs: 


 Vital Signs











Temp  97.5 F L  09/20/18 10:26


 


Pulse  90   09/20/18 10:26


 


Resp  18   09/20/18 10:26


 


BP  173/89   09/20/18 10:26


 


Pulse Ox  97   09/20/18 03:32








 Intake & Output











 09/19/18 09/20/18 09/20/18





 18:59 06:59 18:59


 


Intake Total 730  240


 


Output Total 400 300 


 


Balance 330 -300 240


 


Weight 114.6 kg 114 kg 


 


Intake:   


 


    


 


  Oral 120  240


 


  Blood Product 310  0


 


    Rc As-1  Unit   0





    [id number]   


 


    Rc As-1  Unit 310  





    [id number]   


 


Output:   


 


  Urine 400 300 


 


Other:   


 


  Voiding Method Toilet Toilet 





 Urinal Urinal 


 


  # Voids  2 


 


  # Bowel Movements 1 1 














- Exam


Head normocephalic


Neck supple


Lungs clear to auscultation bilaterally no wheezing or crackles


Heart regular rate and rhythm S1-S2, no rub or gallop


Abdomen is soft nontender nondistended positive bowel sounds no 

hepatosplenomegaly.  Left lower quadrant abdominal mass


Extremities no edema


Neuro alert and orientated to 








- Labs


CBC & Chem 7: 


 09/20/18 05:38





 09/20/18 05:38


Labs: 


 Abnormal Lab Results - Last 24 Hours (Table)











  09/19/18 09/20/18 09/20/18 Range/Units





  11:17 05:38 05:38 


 


WBC   10.9 H   (3.8-10.6)  k/uL


 


RBC   2.21 L   (4.30-5.90)  m/uL


 


Hgb   6.8 L*   (13.0-17.5)  gm/dL


 


Hct   20.7 L   (39.0-53.0)  %


 


RDW   16.5 H   (11.5-15.5)  %


 


Neutrophils #   8.2 H   (1.3-7.7)  k/uL


 


Chloride    115 H  ()  mmol/L


 


Creatinine    1.88 H  (0.66-1.25)  mg/dL


 


Glucose    107 H  (74-99)  mg/dL


 


Calcium    8.1 L  (8.4-10.2)  mg/dL


 


Total Protein    4.9 L  (6.3-8.2)  g/dL


 


Albumin    2.2 L  (3.5-5.0)  g/dL


 


Crossmatch  See Detail    














Assessment and Plan


Assessment: 


1 GI bleed secondary to acute sigmoid diverticulitis.  CT of the abdomen and 

pelvis completed showing complicated acute sigmoid diverticulitis with a 8 x 7 

x 5 CM Phlegmon juxtaposed between the sigmoid and the urinary bladder.  Dr. Barrios per surgical services consulted.   EGD performed on 9/14.  EGD showing 

no active bleeding visualized.  Small nonbleeding 6 mm antral polyp.  Mild 

antral gastritis and mild duodenitis.  Ferrous sulfate has been added.  Patient 

having episodes of dark red bowel movements last night.  Hemoglobin dropping to 

6.8.  Patient did receive another unit of packed red blood cells.  Patient has 

received a total of 5 units PRBC cells.  Colonoscopy completed with polypectomy 

2.  Patient did have red burgundy stools throughout night.  Hemoglobin 6.8.  

Patient receiving another unit of packed red blood cells.  Per GI services 

continue clear liquids.  CBCs every 6 hours.  Small bowel capsule 

endoscopically discussed.





2. masslike collection within the abdomen measuring  8 x 7 x 5 cm in size in 

between the sigmoid and the urinary bladder.  GI services have been consulted 

per critical care and CEA ordered.  CEA level 1. per GI services patient will 

benefit from interventional radiology percutaneous drainage but will refer to 

general surgical services at this time.  WBC 11.4.  Patient does have midline 

in place.  Patient will be discharged home on Invanz antibiotic per ID.  The 

BBC improving to 10.9





3. patchy opacity left lower lobe.  CTA completed showing left lower lobe 5 x 5 

x 3 cm pulmonary consolidative opacity.  Dr. Marsha dahl for pulmonary 

services.  Per pulmonary services patient does not show any signs or symptoms 

of pneumonia at this point.





4. previous history of CVA





5. leukocytosis.  Patient did have elevated temperature on arrival to emergency 

room. Blood and urine cultures have been ordered.  Patient currently on IV 

Zosyn and vancomycin for antibiotics.  Dr. Santana per infectious disease has 

been consulted.  Blood culture showing coagulase-negative staph.  Patient 

remains on Zosyn for IV antibiotics.  Vancomycin has been discontinued per ID.





6. syncope.  CT of head completed showing no acute intracranial hemorrhage or 

midline shift.  There is moderate diffuse age-related cerebral atrophy and 

chronic small vessel ischemic change redemonstrated.  No significant change 

from recent CT





7.  Recent dental work.  Patient was previously taking Augmentin at home.  CTA 

completed showing no significant acute abnormality.  No significant change from 

recent CT of neck study.





8.  Acute kidney injury.  Creatinine increasing to 1.46.  Normal Saline at 100.

  Continue to Monitor closely.  Creatinine increasing to 2.06.  Vancomycin has 

been DC'd per infectious disease.    We'll continue to monitor closely





GI prophylaxis Protonix.  DVT prophylaxis SCDs





I performed an examination of the patient and discussed their management with 

the Nurse Practitioner.  I have reviewed the Nurse Practitioner's notes and 

agree with the documented findings and plan of care

## 2018-09-20 NOTE — P.PN
Subjective


Progress Note Date: 09/20/18





78-year-old male seen this morning.  Appears in no acute distress.  Nursing 

reports patient did have 2 burgundy colored bowel movements.  Hemoglobin 6.8 

plan is to give 1 unit of packed red blood cells.  Patient is status post EGD 

done by GI service which showed no evidence of peptic ulcer disease. status 

post colonoscopy done yesterday for evaluation of hematochezia anemia with 

cecal polypectomy, transverse polypectomy in piecemeal fashion.  





Objective





- Vital Signs


Vital signs: 


 Vital Signs











Temp  97.8 F   09/20/18 12:41


 


Pulse  91   09/20/18 12:41


 


Resp  18   09/20/18 12:41


 


BP  154/89   09/20/18 12:41


 


Pulse Ox  97   09/20/18 03:32








 Intake & Output











 09/19/18 09/20/18 09/20/18





 18:59 06:59 18:59


 


Intake Total 730  550


 


Output Total 400 300 


 


Balance 330 -300 550


 


Weight 114.6 kg 114 kg 


 


Intake:   


 


    


 


  Oral 120  240


 


  Blood Product 310  310


 


    Rc As-1  Unit   310





    [id number]   


 


    Rc As-1  Unit 310  





    [id number]   


 


Output:   


 


  Urine 400 300 


 


Other:   


 


  Voiding Method Toilet Toilet 





 Urinal Urinal 


 


  # Voids  2 


 


  # Bowel Movements 1 1 














- Exam





Physical exam


Pleasant 78-year-old male sitting up in bed in no acute distress


Lungs adequate air movement bilaterally on room air


Heart S1-S2 audible denying chest pain


Abdomen soft not distended nontender.  States abdominal pain resolved urinating 

no difficulty no nausea no vomiting reportedly had 2 burgundy colored stools


Extremities no edema noted.











- Labs


CBC & Chem 7: 


 09/20/18 05:38





 09/20/18 05:38


Labs: 


 Abnormal Lab Results - Last 24 Hours (Table)











  09/19/18 09/20/18 09/20/18 Range/Units





  11:17 05:38 05:38 


 


WBC   10.9 H   (3.8-10.6)  k/uL


 


RBC   2.21 L   (4.30-5.90)  m/uL


 


Hgb   6.8 L*   (13.0-17.5)  gm/dL


 


Hct   20.7 L   (39.0-53.0)  %


 


RDW   16.5 H   (11.5-15.5)  %


 


Neutrophils #   8.2 H   (1.3-7.7)  k/uL


 


Chloride    115 H  ()  mmol/L


 


Creatinine    1.88 H  (0.66-1.25)  mg/dL


 


Glucose    107 H  (74-99)  mg/dL


 


Calcium    8.1 L  (8.4-10.2)  mg/dL


 


Total Protein    4.9 L  (6.3-8.2)  g/dL


 


Albumin    2.2 L  (3.5-5.0)  g/dL


 


Crossmatch  See Detail    














Assessment and Plan


Assessment: 





Impression


Present on admission 2 large bloody stools suspect GI bleed due to acute 

sigmoid diverticulitis


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


Leukocytosis febrile present on admission


Masslike collection per computed tomography scan of the abdomen between sigmoid 

and urinary bladder noted


Patchy opacity left lower lobe


Computed tomography scan abdomen and pelvis show complicated acute sigmoid 

diverticulitis with [id number] phlegmon to the post between the sigmoid and the 

urinary bladder


Present on admission 2 large bloody stools 1 unit packed red blood cells given 

in the emergency room suspect GI bleed


Persistent fever with preliminary blood culture gram-positive cocci in clusters


Component of acute blood loss anemia


Status post colonoscopy done 19th of September Cecal polypectomy with cold snare

, transverse colon polypectomy with cold snare in piecemeal fashion, 

diverticulosis of the sigmoid and descending colon, internal hemorrhoids, poor 

prep








Plan


Continue recommendations by GI service defer to


Per GIs recommendations repeat colonoscopy in 6-12 months given piecemeal 

resection of the transverse colon polyp as well as poor prep


Continue to monitor hemoglobin and transfuse as needed


Per GI okay to restart full liquid diet today and advance to GI soft tomorrow


DVT and GI prophylaxis


Patients to receive 1 unit packed red blood cells


GI service small bowel capsule endoscopic discussed


CBC every 6 hours attending  to follow


Continue to follow closely addressing surgical issues as they arise











The above impression and plan of care have been discussed and directed by 

signing physician. Samira Schaeffer nurse practitioner acting as scribe for signing 

physician.

## 2018-09-20 NOTE — P.PN
Subjective


Progress Note Date: 09/20/18


Principal diagnosis: 


Sepsis secondary to complicated sigmoid diverticulitis GI bleed anemia





Admitted with acute colonic diverticulitis with intermittent rectal bleeding.  

Status post EGD no evidence of peptic ulcer disease.  Status post colonoscopy 

yesterday for evaluation of hematochezia anemia with cecal polypectomy, 

transverse polypectomy in piecemeal fashion.  This morning nursing reports 

passage of 2 burgundy-colored bowel movements without pain.  Afebrile.  

Hemoglobin 6.8 receiving a unit of blood.  





Objective





- Vital Signs


Vital signs: 


 Vital Signs











Temp  97.5 F L  09/20/18 10:26


 


Pulse  90   09/20/18 10:26


 


Resp  18   09/20/18 10:26


 


BP  173/89   09/20/18 10:26


 


Pulse Ox  97   09/20/18 03:32








 Intake & Output











 09/19/18 09/20/18 09/20/18





 18:59 06:59 18:59


 


Intake Total 730  240


 


Output Total 400 300 


 


Balance 330 -300 240


 


Weight 114.6 kg 114 kg 


 


Intake:   


 


    


 


  Oral 120  240


 


  Blood Product 310  0


 


    Rc As-1  Unit   0





    [id number]   


 


    Rc As-1  Unit 310  





    [id number]   


 


Output:   


 


  Urine 400 300 


 


Other:   


 


  Voiding Method Toilet Toilet 





 Urinal Urinal 


 


  # Voids  2 


 


  # Bowel Movements 1 1 














- Exam


General appearance: The patient is alert, oriented, in no acute distress.


HET: Head is normocephalic and atraumatic.  Pupils are equal and reactive.  

Oropharynx is clear without lesions.


Neck: Supple without lymphadenopathy.  Trachea midline.


Heart: S1 S2.  Regular rate and rhythm.


Lungs: No crackles or wheezes are heard.


Abdomen: Soft, nontender, nondistended with  bowel sounds.  No peritoneal 

signs.  


Extremities: Normal skin color and turgor.  No cyanosis, rash, ulceration, 

clubbing, or edema.  Radial and pedal pulses are 2/4 bilaterally.


Neurological: No focal deficits.  Strength and sensation are grossly intact.








- Labs


CBC & Chem 7: 


 09/20/18 05:38





 09/20/18 05:38


Labs: 


 Abnormal Lab Results - Last 24 Hours (Table)











  09/19/18 09/20/18 09/20/18 Range/Units





  11:17 05:38 05:38 


 


WBC   10.9 H   (3.8-10.6)  k/uL


 


RBC   2.21 L   (4.30-5.90)  m/uL


 


Hgb   6.8 L*   (13.0-17.5)  gm/dL


 


Hct   20.7 L   (39.0-53.0)  %


 


RDW   16.5 H   (11.5-15.5)  %


 


Neutrophils #   8.2 H   (1.3-7.7)  k/uL


 


Chloride    115 H  ()  mmol/L


 


Creatinine    1.88 H  (0.66-1.25)  mg/dL


 


Glucose    107 H  (74-99)  mg/dL


 


Calcium    8.1 L  (8.4-10.2)  mg/dL


 


Total Protein    4.9 L  (6.3-8.2)  g/dL


 


Albumin    2.2 L  (3.5-5.0)  g/dL


 


Crossmatch  See Detail    














Assessment and Plan


(1) Diverticulitis


Narrative/Plan: 


78-year-old gentleman admitted with sepsis with multiple constitutional 

symptoms such as fever, leukocytosis, weakness, fatigue, change in appetite, 

unintentional weight loss and darker colored bowel movements suggestive of 

acute GI bleed possibly colonic diverticular with CT abdominal imaging 

suggestive of acute complicated sigmoid diverticulitis with juxtaposed fluid 

phlegmon collection between the urinary bladder and colon. 





EGD evaluation no evidence of bleeding or peptic ulcer disease.


Colonoscopy evaluation cecal polypectomy and transverse polypectomy piecemeal 

fashion.


Current Visit: Yes   Status: Acute   Code(s): K57.92 - DVTRCLI OF INTEST, PART 

UNSP, W/O PERF OR ABSCESS W/O BLEED   SNOMED Code(s): 604289405


   





(2) GI bleed


Narrative/Plan: 


Post colonoscopy with polypectomy 2 passing red burgundy-colored bowel 

movements this morning with a drop in hemoglobin of 6.8 possible post-

polypectomy bleed possible small bowel source.


Current Visit: Yes   Status: Acute   Code(s): K92.2 - GASTROINTESTINAL 

HEMORRHAGE, UNSPECIFIED   SNOMED Code(s): 26795041


   


Plan: 


1.  Clear liquids.  CBC every 6.  Blood transfusions as indicated.  Continue 

with Protonix daily.  Small bowel capsule endoscopy discussed.  Will follow 

closely with you.








Assessment and plan a care discussed with Dr. Rod

## 2018-09-21 VITALS — RESPIRATION RATE: 18 BRPM

## 2018-09-21 LAB
ALBUMIN SERPL-MCNC: 2.5 G/DL (ref 3.5–5)
ALP SERPL-CCNC: 82 U/L (ref 38–126)
ALT SERPL-CCNC: 24 U/L (ref 21–72)
ANION GAP SERPL CALC-SCNC: 5 MMOL/L
AST SERPL-CCNC: 24 U/L (ref 17–59)
BASOPHILS # BLD AUTO: 0 K/UL (ref 0–0.2)
BASOPHILS NFR BLD AUTO: 0 %
BUN SERPL-SCNC: 9 MG/DL (ref 9–20)
CALCIUM SPEC-MCNC: 8.4 MG/DL (ref 8.4–10.2)
CHLORIDE SERPL-SCNC: 116 MMOL/L (ref 98–107)
CO2 SERPL-SCNC: 24 MMOL/L (ref 22–30)
EOSINOPHIL # BLD AUTO: 0.4 K/UL (ref 0–0.7)
EOSINOPHIL NFR BLD AUTO: 4 %
ERYTHROCYTE [DISTWIDTH] IN BLOOD BY AUTOMATED COUNT: 2.64 M/UL (ref 4.3–5.9)
ERYTHROCYTE [DISTWIDTH] IN BLOOD: 16.1 % (ref 11.5–15.5)
GLUCOSE SERPL-MCNC: 83 MG/DL (ref 74–99)
HCT VFR BLD AUTO: 24.1 % (ref 39–53)
HGB BLD-MCNC: 8.2 GM/DL (ref 13–17.5)
LYMPHOCYTES # SPEC AUTO: 1.5 K/UL (ref 1–4.8)
LYMPHOCYTES NFR SPEC AUTO: 15 %
MCH RBC QN AUTO: 31.1 PG (ref 25–35)
MCHC RBC AUTO-ENTMCNC: 34 G/DL (ref 31–37)
MCV RBC AUTO: 91.4 FL (ref 80–100)
MONOCYTES # BLD AUTO: 0.5 K/UL (ref 0–1)
MONOCYTES NFR BLD AUTO: 5 %
NEUTROPHILS # BLD AUTO: 7.4 K/UL (ref 1.3–7.7)
NEUTROPHILS NFR BLD AUTO: 73 %
PLATELET # BLD AUTO: 277 K/UL (ref 150–450)
POTASSIUM SERPL-SCNC: 3.7 MMOL/L (ref 3.5–5.1)
PROT SERPL-MCNC: 5.4 G/DL (ref 6.3–8.2)
SODIUM SERPL-SCNC: 145 MMOL/L (ref 137–145)
WBC # BLD AUTO: 10.1 K/UL (ref 3.8–10.6)

## 2018-09-21 RX ADMIN — Medication SCH: at 00:05

## 2018-09-21 RX ADMIN — ATORVASTATIN CALCIUM SCH MG: 40 TABLET, FILM COATED ORAL at 20:17

## 2018-09-21 RX ADMIN — Medication SCH MG: at 16:55

## 2018-09-21 RX ADMIN — PANTOPRAZOLE SODIUM SCH MG: 40 INJECTION, POWDER, FOR SOLUTION INTRAVENOUS at 08:50

## 2018-09-21 RX ADMIN — PANTOPRAZOLE SODIUM SCH MG: 40 INJECTION, POWDER, FOR SOLUTION INTRAVENOUS at 20:17

## 2018-09-21 RX ADMIN — CEFAZOLIN SCH: 330 INJECTION, POWDER, FOR SOLUTION INTRAMUSCULAR; INTRAVENOUS at 19:28

## 2018-09-21 RX ADMIN — CEFAZOLIN SCH: 330 INJECTION, POWDER, FOR SOLUTION INTRAMUSCULAR; INTRAVENOUS at 04:00

## 2018-09-21 RX ADMIN — ERTAPENEM SODIUM SCH MLS/HR: 1 INJECTION, POWDER, LYOPHILIZED, FOR SOLUTION INTRAMUSCULAR; INTRAVENOUS at 08:51

## 2018-09-21 RX ADMIN — CEFAZOLIN SCH: 330 INJECTION, POWDER, FOR SOLUTION INTRAMUSCULAR; INTRAVENOUS at 19:24

## 2018-09-21 NOTE — P.PN
Subjective


Progress Note Date: 09/21/18


This is a 78-year-old male patient of Dr. Kinney.  Patient presented to the 

hospital with feeling of increased weakness and dizziness.  Upon arrival to 

emergency room patient had 2 large bloody bowel movements.  Patient has known 

past medical history of CVA and hypertension.  Patient was given 1 unit of 

blood in the emergency room.  Patient was on Augmentin prior to hospitalization 

for dental work.  CT a of the head and neck completed showing no significant 

acute abnormality.  No significant change from recent CT neck study.  Chest x-

ray completed showing chronic changes and cardiomegaly without acute pulmonary 

process.  CT of head completed showing no acute intracranial hemorrhage or 

midline shift.  There is moderate diffuse age-related cerebral atrophy and 

chronic small vessel ischemic changes redemonstrated no significant change from 

recent CT.  CT of abdomen and pelvis completed showing compensated acute 

sigmoid diverticulitis, with a 8 x 7 x 5 cm Phlegmon juxtaposed between the 

sigmoid in the urinary bladder.  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 

consolidative obesity.  If no prior CTs available to ensure stability over time 

the instruments follow-up chest CT is recommended.  Dr. Larson per critical 

care and pulmonary services consulted.  Dr. Barrios per surgical service is 

consulted.  WBC upon arrival 20.1.  Patient currently on Zosyn for IV 

antibiotics.  Hemoglobin 10.7.  Patient did receive 1 unit of PRBCs.  Urine and 

blood cultures have been ordered.  At this time patient denies chest pain or 

shortness of breath.  Patient denies nausea vomiting or diarrhea.  Patient 

denies any urinary burning or frequency.  Abdominal mass felt in lower left 

quadrant.  Discussed case with Dr. Larson per critical care.  Dr. King per GI 

services have been consulted. Carcinoembryonic antigen ordered per Critical 

care.





On 09/13/2018 patient has been moved out of the intensive care unit.  Patient 

is currently alert and oriented 3 resting comfortably in bed denies any 

complaints at this time.  Patient does state he has had a bowel movement that 

appeared dark this a.m.  Patient denies any nausea or vomiting.  Patient denies 

chest pain or shortness of breath.  Denies any urinary burning or frequency.  

Patient is currently on a clear liquid diet per surgical services





On 09/14/2018 per nursing Patient had episode of tarry stool and hemoglobin 

dropped to 7.3.  Patient was symptomatic of low hemoglobin.  Patient received 1 

unit of PRBCs.  Per nursing staff patient will have EGD today.  White blood 

cell increasing to 18.0.  Dr. Santana for ID is following.  Patient maintained 

on vancomycin and Zosyn.





On 09/15/2018 patient is alert and oriented 3 in no apparent distress he 

denies any nausea or vomiting no abdominal pain at this time he had bloody 

bowel movements throughout the night and received 2 units of red blood cell 

transfusion he is feeling better since. He denies any chest pain or shortness 

of breath, no cough no nausea or vomiting and no urinary symptoms.





On 09/16/2018 patient was seen and examined, he is on telemetry floor out of 

the ICU, he denies any abdominal pain, he had 1 bloody bowel movement earlier 

today, otherwise he denies any symptoms there is no fever or chills no headache 

or dizziness no chest pain no shortness of breath no cough no nausea or 

vomiting no abdominal pain and no urinary symptoms.





09/17/2018 patient is currently alert and oriented 3.  Resting comfortably.  

Per nursing staff patient is having bowel movements with minimal blood.  

Patient denies any nausea.  Patient denies any Shortness breath or chest pain.  

Patient denies any urinary symptoms





On 09/18/2018  patient is alert and orientated 3.  Per nursing staff patient 

had increased episodes of dark red bowel movements last night.  Hemoglobin 

dropping to 6.8.  Patient did receive 1 unit PRBCs.  Per GI services planning 

for colonoscopy tomorrow in a.m. at this time patient denies any abdominal 

pain.  Patient denies nausea vomiting or diarrhea.  Patient denies any urinary 

burning or frequency.  Patient denies any chest pain or shortness breath.  

Repeat hemoglobin 7.1





09/19/2018 patient is alert and oriented 3.  Patient going for colonoscopy 

today with Dr. Rod.  Hemoglobin 6.8.  Patient received 1 unit PRBCs.  

Patient denies chest pain or shortness breath.  Denies any nausea vomiting or 

diarrhea.  Patient denies any urinary burning or frequency.





On 09/20/2018 patient remains alert and oriented 3.  Patient did have 

colonoscopy yesterday with Dr. Rod.  Patient did have episode of maroon 

stool last. hemoglobin  this morning 6.8 patient will receive an additional 

unit of PRBCs today.  Patient remains asymptomatic denies nausea vomiting or 

diarrhea.  Denies any abdominal pain.  Patient denies chest pain or shortness 

breath.  Patient denies any urinary burning or frequency





On 09/21/2018 remains alert and oriented 3.  Patient currently getting capsule 

study.  Per GI service is patient to be advanced to full liquid diet after 

completion of capsule study.  CBC to be monitored.  Patient's hemoblobuin this 

AM 8.2.  We'll continue to monitor closely.  Patient remains asymptomatic.  

Patient denies chest pain or shortness of breath.  Patient denies any urinary 

burning or frequency.














Objective





- Vital Signs


Vital signs: 


 Vital Signs











Temp  97.1 F L  09/21/18 11:29


 


Pulse  81   09/21/18 11:29


 


Resp  20   09/21/18 11:29


 


BP  166/77   09/21/18 11:29


 


Pulse Ox  95   09/21/18 11:29








 Intake & Output











 09/20/18 09/21/18 09/21/18





 18:59 06:59 18:59


 


Intake Total 1190  


 


Output Total 1400 300 500


 


Balance -210 -300 -500


 


Weight  111 kg 


 


Intake:   


 


  Oral 880  


 


  Blood Product 310  


 


    Rc As-1  Unit 310  





    [id number]   


 


Output:   


 


  Urine 1400  500


 


  Stool  300 


 


Other:   


 


  Voiding Method Toilet Toilet 





 Urinal Urinal 


 


  # Voids  2 


 


  # Bowel Movements  2 














- Exam


Head normocephalic


Neck supple


Lungs clear to auscultation bilaterally no wheezing or crackles


Heart regular rate and rhythm S1-S2, no rub or gallop


Abdomen is soft nontender nondistended positive bowel sounds no 

hepatosplenomegaly.  Left lower quadrant abdominal mass


Extremities no edema


Neuro alert and orientated to 








- Labs


CBC & Chem 7: 


 09/21/18 06:24





 09/21/18 06:24


Labs: 


 Abnormal Lab Results - Last 24 Hours (Table)











  09/21/18 09/21/18 Range/Units





  06:24 06:24 


 


RBC  2.64 L   (4.30-5.90)  m/uL


 


Hgb  8.2 L   (13.0-17.5)  gm/dL


 


Hct  24.1 L   (39.0-53.0)  %


 


RDW  16.1 H   (11.5-15.5)  %


 


Chloride   116 H  ()  mmol/L


 


Creatinine   1.78 H  (0.66-1.25)  mg/dL


 


Total Protein   5.4 L  (6.3-8.2)  g/dL


 


Albumin   2.5 L  (3.5-5.0)  g/dL














Assessment and Plan


Assessment: 


1 GI bleed secondary to acute sigmoid diverticulitis.  CT of the abdomen and 

pelvis completed showing complicated acute sigmoid diverticulitis with a 8 x 7 

x 5 CM Phlegmon juxtaposed between the sigmoid and the urinary bladder.  Dr. Barrios per surgical services consulted.   EGD performed on 9/14.  EGD showing 

no active bleeding visualized.  Small nonbleeding 6 mm antral polyp.  Mild 

antral gastritis and mild duodenitis.  Ferrous sulfate has been added.  Patient 

having episodes of dark red bowel movements last night.  Hemoglobin dropping to 

6.8.  Patient did receive another unit of packed red blood cells.  Patient has 

received a total of 5 units PRBC cells.  Colonoscopy completed with polypectomy 

2.  Patient did have red burgundy stools throughout night.  Hemoglobin 6.8.  

Patient receiving another unit of packed red blood cells.  Per GI services 

continue clear liquids.  CBCs every 6 hours.  Small bowel capsule 

endoscopically being completed today.  Patient will be advanced for liquid diet 

upon completion.  Hemoglobin 8.2.  We'll continue to monitor closely





2. masslike collection within the abdomen measuring  8 x 7 x 5 cm in size in 

between the sigmoid and the urinary bladder.  GI services have been consulted 

per critical care and CEA ordered.  CEA level 1. per GI services patient will 

benefit from interventional radiology percutaneous drainage but will refer to 

general surgical services at this time.  WBC 11.4.  Patient does have midline 

in place.  Patient will be discharged home on Invanz antibiotic per ID. WBC 

improving to 10.1





3. patchy opacity left lower lobe.  CTA completed showing left lower lobe 5 x 5 

x 3 cm pulmonary consolidative opacity.  Dr. Larson following for pulmonary 

services.  Per pulmonary services patient does not show any signs or symptoms 

of pneumonia at this point.





4. previous history of CVA





5. leukocytosis.  Patient did have elevated temperature on arrival to emergency 

room. Blood and urine cultures have been ordered.  Patient currently on IV 

Zosyn and vancomycin for antibiotics.  Dr. Santana per infectious disease has 

been consulted.  Blood culture showing coagulase-negative staph.  Patient 

remains on Zosyn for IV antibiotics.  Vancomycin has been discontinued per ID.





6. syncope.  CT of head completed showing no acute intracranial hemorrhage or 

midline shift.  There is moderate diffuse age-related cerebral atrophy and 

chronic small vessel ischemic change redemonstrated.  No significant change 

from recent CT





7.  Recent dental work.  Patient was previously taking Augmentin at home.  CTA 

completed showing no significant acute abnormality.  No significant change from 

recent CT of neck study.





8.  Acute kidney injury.  Creatinine increasing to 1.46.  Normal Saline at 100.

  Continue to Monitor closely.  Creatinine increasing to 2.06.  Vancomycin has 

been DC'd per infectious disease.    We'll continue to monitor closely.  

Creatinine trending down 1.78





GI prophylaxis Protonix.  DVT prophylaxis SCDs





I performed an examination of the patient and discussed their management with 

the Nurse Practitioner.  I have reviewed the Nurse Practitioner's notes and 

agree with the documented findings and plan of care

## 2018-09-21 NOTE — P.PN
Subjective


Progress Note Date: 09/21/18





78-year-old  male presents to Hospital feeling weak for relatively 

short period of time before admission, he however developed bloody stool and 

constantly sought medical care.  Upon arrival imaging studies revealed evidence 

of significant diverticulitis with phlegmon in the sigmoid area abutting the 

urinary bladder.  The patient for shall he has no urinary symptoms, no 

hematuria or urinary urgency or dysuria.  He's had no air or feculent material 

in his urine.  The patient is thought to have a known history of diverticulosis 

but has never had severe diverticulitis.  Does not recall any specific recent 

changes before the onset of this difficulty.


Denies any history of high-grade fevers chills rigors or sweats before 

admission.  Denies significant nausea or emesis or prior bouts of hematemesis 

melena or hematochezia before admission.


09/17/2018 patient is feeling better, no further gastrointestinal bleeding.  Is 

having no urinary symptoms and overall is feeling better.  Cultures become 

available and vancomycin has been discontinued.


09/21/2018 is noted the blood cultures contamination and vancomycin was 

discontinued.  However has had further gastrointestinal bleeding, transfusion 

was required has been closely followed by gastroenterology for any further 

therapeutic needs.





Objective





- Vital Signs


Vital signs: 


 Vital Signs











Temp  97.2 F L  09/21/18 15:48


 


Pulse  77   09/21/18 15:48


 


Resp  20   09/21/18 15:48


 


BP  188/95   09/21/18 15:48


 


Pulse Ox  95   09/21/18 11:29








 Intake & Output











 09/20/18 09/21/18 09/21/18





 18:59 06:59 18:59


 


Intake Total 1190  


 


Output Total 1400 300 500


 


Balance -210 -300 -500


 


Weight  111 kg 


 


Intake:   


 


  Oral 880  


 


  Blood Product 310  


 


    Rc As-1  Unit 310  





    [id number]   


 


Output:   


 


  Urine 1400  500


 


  Stool  300 


 


Other:   


 


  Voiding Method Toilet Toilet 





 Urinal Urinal 


 


  # Voids  2 


 


  # Bowel Movements  2 














- Exam





Pleasant 78-year-old male in no gail distress


HEENT: Anicteric conjunctiva are pink and moist nasal mucosa grossly intact 

without significant lesions, there is no thrush.


Neck: The neck is supple without significant lymphadenopathy or thyromegaly.


Lungs: Good bilateral air entry without significant crackles or wheezing.  

There is no significant bronchial sounds.  There is no egophony or dullness.


Heart: Regular rate and rhythm with an audible S1-S2, no S3 no S4.  There is no 

significant murmur click or rub, PMI was nondisplaced.


Abdomen: Positive bowel sounds soft and nontender without palpable masses or 

organomegaly.  There was no guarding or rebound.


Extremities: The upper extremities have excellent pulses they are symmetric, no 

significant petechiae or telangiectasia.  No splinter hemorrhages were noted.  

The lower extremities are free from significant edema.  The peripheral pulses 

were 2+ and symmetric.


Neuro: Awake alert oriented to person place and time.  There are no acute new 

gross focal sensory motor deficits.








- Labs


CBC & Chem 7: 


 09/21/18 06:24





 09/21/18 06:24


Labs: 


 Abnormal Lab Results - Last 24 Hours (Table)











  09/21/18 09/21/18 Range/Units





  06:24 06:24 


 


RBC  2.64 L   (4.30-5.90)  m/uL


 


Hgb  8.2 L   (13.0-17.5)  gm/dL


 


Hct  24.1 L   (39.0-53.0)  %


 


RDW  16.1 H   (11.5-15.5)  %


 


Chloride   116 H  ()  mmol/L


 


Creatinine   1.78 H  (0.66-1.25)  mg/dL


 


Total Protein   5.4 L  (6.3-8.2)  g/dL


 


Albumin   2.5 L  (3.5-5.0)  g/dL








 Laboratory Results











WBC  10.1 k/uL (3.8-10.6)   09/21/18  06:24    


 


RBC  2.64 m/uL (4.30-5.90)  L  09/21/18  06:24    


 


Hgb  8.2 gm/dL (13.0-17.5)  L  09/21/18  06:24    


 


Hct  24.1 % (39.0-53.0)  L  09/21/18  06:24    


 


MCV  91.4 fL (80.0-100.0)   09/21/18  06:24    


 


MCH  31.1 pg (25.0-35.0)   09/21/18  06:24    


 


MCHC  34.0 g/dL (31.0-37.0)   09/21/18  06:24    


 


RDW  16.1 % (11.5-15.5)  H  09/21/18  06:24    


 


Plt Count  277 k/uL (150-450)   09/21/18  06:24    


 


Neutrophils %  73 %  09/21/18  06:24    


 


Lymphocytes %  15 %  09/21/18  06:24    


 


Monocytes %  5 %  09/21/18  06:24    


 


Eosinophils %  4 %  09/21/18  06:24    


 


Basophils %  0 %  09/21/18  06:24    


 


Neutrophils #  7.4 k/uL (1.3-7.7)   09/21/18  06:24    


 


Lymphocytes #  1.5 k/uL (1.0-4.8)   09/21/18  06:24    


 


Monocytes #  0.5 k/uL (0-1.0)   09/21/18  06:24    


 


Eosinophils #  0.4 k/uL (0-0.7)   09/21/18  06:24    


 


Basophils #  0.0 k/uL (0-0.2)   09/21/18  06:24    


 


Hypochromasia  Slight   09/21/18  06:24    


 


Anisocytosis  Slight   09/21/18  06:24    


 


PT  11.1 sec (9.0-12.0)   09/15/18  01:28    


 


INR  1.1  (<1.2)   09/15/18  01:28    


 


APTT  20.0 sec (22.0-30.0)  L  09/15/18  01:28    


 


Sodium  145 mmol/L (137-145)   09/21/18  06:24    


 


Potassium  3.7 mmol/L (3.5-5.1)   09/21/18  06:24    


 


Chloride  116 mmol/L ()  H  09/21/18  06:24    


 


Carbon Dioxide  24 mmol/L (22-30)   09/21/18  06:24    


 


Anion Gap  5 mmol/L  09/21/18  06:24    


 


BUN  9 mg/dL (9-20)   09/21/18  06:24    


 


Creatinine  1.78 mg/dL (0.66-1.25)  H  09/21/18  06:24    


 


Est GFR (CKD-EPI)AfAm  42  (>60 ml/min/1.73 sqM)   09/21/18  06:24    


 


Est GFR (CKD-EPI)NonAf  36  (>60 ml/min/1.73 sqM)   09/21/18  06:24    


 


Glucose  83 mg/dL (74-99)   09/21/18  06:24    


 


POC Glucose (mg/dL)  103 mg/dL (75-99)  H  09/18/18  01:02    


 


POC Glu Operater ID  Rahul Escobar   09/18/18  01:02    


 


Calcium  8.4 mg/dL (8.4-10.2)   09/21/18  06:24    


 


Phosphorus  3.5 mg/dL (2.5-4.5)   09/16/18  04:59    


 


Magnesium  2.2 mg/dL (1.6-2.3)   09/16/18  04:59    


 


Total Bilirubin  0.6 mg/dL (0.2-1.3)   09/21/18  06:24    


 


AST  24 U/L (17-59)   09/21/18  06:24    


 


ALT  24 U/L (21-72)   09/21/18  06:24    


 


Alkaline Phosphatase  82 U/L ()   09/21/18  06:24    


 


Total Creatine Kinase  52 U/L ()  L  09/11/18  14:54    


 


CK-MB (CK-2)  0.6 ng/mL (0.0-2.4)   09/11/18  14:54    


 


CK-MB (CK-2) Rel Index  1.2   09/11/18  14:54    


 


Troponin I  <0.012 ng/mL (0.000-0.034)   09/11/18  14:54    


 


Total Protein  5.4 g/dL (6.3-8.2)  L  09/21/18  06:24    


 


Albumin  2.5 g/dL (3.5-5.0)  L  09/21/18  06:24    


 


Carcinoembryonic Ag  1.2 ng/mL (0.0-4.9)   09/11/18  14:55    


 


Urine Color  Yellow   09/11/18  16:52    


 


Urine Appearance  Clear  (Clear)   09/11/18  16:52    


 


Urine pH  5.5  (5.0-8.0)   09/11/18  16:52    


 


Ur Specific Gravity  >1.050  (1.001-1.035)  H  09/11/18  16:52    


 


Urine Protein  1+  (Negative)  H  09/11/18  16:52    


 


Urine Glucose (UA)  Negative  (Negative)   09/11/18  16:52    


 


Urine Ketones  Negative  (Negative)   09/11/18  16:52    


 


Urine Blood  Small  (Negative)  H  09/11/18  16:52    


 


Urine Nitrite  Negative  (Negative)   09/11/18  16:52    


 


Urine Bilirubin  Negative  (Negative)   09/11/18  16:52    


 


Urine Urobilinogen  <2.0 mg/dL (<2.0)   09/11/18  16:52    


 


Ur Leukocyte Esterase  Trace  (Negative)  H  09/11/18  16:52    


 


Urine RBC  14 /hpf (0-5)  H  09/11/18  16:52    


 


Urine WBC  2 /hpf (0-5)   09/11/18  16:52    


 


Urine Mucus  Moderate /hpf (None)  H  09/11/18  16:52    


 


Vancomycin Trough  22.8 ug/mL  09/18/18  01:10    


 


Blood Type  A Positive   09/19/18  11:17    


 


Blood Type Confirm  A Positive   09/11/18  17:52    


 


Blood Type Recheck  No   09/19/18  11:17    


 


Antibody Screen  NEGATIVE   09/19/18  11:17    


 


Crossmatch  See Detail   09/19/18  11:17    


 


Spec Expiration Date  09/22/2018 - 2317 09/19/18  11:17    








 Microbiology





09/11/18 17:52   Blood   Blood Culture Gram Stain - Final


09/11/18 17:52   Blood   Blood Culture - Final


                            Coagulase Negative Staph


09/11/18 16:52   Urine,Clean Catch   Urine Culture - Final


09/11/18 17:52   Blood   Blood Culture - Final











Assessment and Plan


(1) Diverticulitis


Current Visit: Yes   Status: Acute   Code(s): K57.92 - DVTRCLI OF INTEST, PART 

UNSP, W/O PERF OR ABSCESS W/O BLEED   SNOMED Code(s): 693316591


   





(2) GI bleed


Current Visit: Yes   Status: Acute   Code(s): K92.2 - GASTROINTESTINAL 

HEMORRHAGE, UNSPECIFIED   SNOMED Code(s): 92474985


   





(3) Gram-positive cocci bacteremia


Narrative/Plan: 


Pleasant 78-year-old  male presents to Hospital with gastrointestinal 

bleed and severe abdominal pain which was very concerning and consequently he 

presented to the emergency center.  Computed tomography scan was performed 

revealing evidence of phlegmon between the sigmoid colon and the bladder.  He's 

been seen by surgery and is being monitored at this point in time.  It is not 

thought that an abscesses yet formed and percutaneous drainage is not possible.

  The patient has not had ongoing gastrointestinal bleeding, hemodynamically 

stable and hemoglobin is stable.  He is feeling relatively well.  There is 

evidence of the positive blood culture which will determine our course of 

antibiotic therapy at discharge.  Currently Zosyn and vancomycin are being 

utilized.  If he improves we'll have to determine what her best possible 

options are as far as antibiotic therapy.  Patient may be an excellent 

candidate for outpatient intravenous antibiotic therapy for the treatment of 

this complex infection.  Goal will be to treat the phlegmon, stabilized the 

patient for outpatient endoscopy in the future to evaluate his need for any 

type of surgical intervention in the future.  The leukocytosis is directly 

related to his current diverticulitis and sepsis.





09/17/2018 patient is feeling better.  The blood cultures and is quite was 

negative staph and constantly vancomycin is discontinued.


Patient is definitely feeling better amount of gastric intestinal bleeding 

appears to have improved.  Hemoglobin is stable at 7.1 no plans for further 

transfusion.


Still has some leukocytosis, improved but not resolved it appears related to 

the current intra-abdominal infection with phlegmon related to his 

diverticulitis.  Fortunately is showing some improvements.


With a blood culture being a contamination we can ask for IV access to be 

placed in an plan for outpatient intravenous antibiotic therapy.





09/21/2018 patient continues to have improvement.  The patient is responding to 

the intravenous antibiotic therapy with ertapenem for the intra-abdominal 

phlegmon.  Havingamounts of pain at this point in time other new complaints.  

Was having some further gastrointestinal bleeding he has now had 7 units of 

packed red cells but appears to have stability of his hemoglobin at this point 

in time.  Is being closely monitored by gastroenterology regarding his gastric 

intestinal bleeding.  Patient will transition to the outpatient setting when he 

is more stable.


Current Visit: Yes   Status: Acute   Code(s): R78.81 - BACTEREMIA   SNOMED Code(

s): 547315967784

## 2018-09-21 NOTE — P.PN
Subjective


Progress Note Date: 09/21/18


Principal diagnosis: 


Sepsis secondary to complicated sigmoid diverticulitis GI bleed anemia





Admitted with acute colonic diverticulitis with intermittent rectal bleeding.  

Status post EGD no evidence of peptic ulcer disease.  Status post colonoscopy 

for evaluation of hematochezia anemia with cecal polypectomy, transverse 

polypectomy in piecemeal fashion.  Yesterday he was passing bloody stools.  

Capsule study ordered and in progress.  No further bloody bowel movements this 

morning.  Feels well.  Hemoglobin 8.2.





Objective





- Vital Signs


Vital signs: 


 Vital Signs











Temp  97.1 F L  09/21/18 11:29


 


Pulse  81   09/21/18 11:29


 


Resp  20   09/21/18 11:29


 


BP  166/77   09/21/18 11:29


 


Pulse Ox  95   09/21/18 11:29








 Intake & Output











 09/20/18 09/21/18 09/21/18





 18:59 06:59 18:59


 


Intake Total 1190  


 


Output Total 1400 300 


 


Balance -210 -300 


 


Weight  111 kg 


 


Intake:   


 


  Oral 880  


 


  Blood Product 310  


 


    Rc As-1  Unit 310  





    [id number]   


 


Output:   


 


  Urine 1400  


 


  Stool  300 


 


Other:   


 


  Voiding Method Toilet Toilet 





 Urinal Urinal 


 


  # Voids  2 


 


  # Bowel Movements  2 














- Exam


General appearance: The patient is alert, oriented, in no acute distress.


HET: Head is normocephalic and atraumatic.  Pupils are equal and reactive.  

Oropharynx is clear without lesions.


Neck: Supple without lymphadenopathy.  Trachea midline.


Heart: S1 S2.  Regular rate and rhythm.


Lungs: No crackles or wheezes are heard.


Abdomen: Soft, nontender, nondistended with  bowel sounds.  No peritoneal 

signs.  


Extremities: Normal skin color and turgor.  No cyanosis, rash, ulceration, 

clubbing, or edema.  Radial and pedal pulses are 2/4 bilaterally.


Neurological: No focal deficits.  Strength and sensation are grossly intact.








- Labs


CBC & Chem 7: 


 09/21/18 06:24





 09/21/18 06:24


Labs: 


 Abnormal Lab Results - Last 24 Hours (Table)











  09/19/18 09/21/18 09/21/18 Range/Units





  11:17 06:24 06:24 


 


RBC   2.64 L   (4.30-5.90)  m/uL


 


Hgb   8.2 L   (13.0-17.5)  gm/dL


 


Hct   24.1 L   (39.0-53.0)  %


 


RDW   16.1 H   (11.5-15.5)  %


 


Chloride    116 H  ()  mmol/L


 


Creatinine    1.78 H  (0.66-1.25)  mg/dL


 


Total Protein    5.4 L  (6.3-8.2)  g/dL


 


Albumin    2.5 L  (3.5-5.0)  g/dL


 


Crossmatch  See Detail    














Assessment and Plan


(1) Diverticulitis


Narrative/Plan: 


78-year-old gentleman admitted with sepsis with multiple constitutional 

symptoms such as fever, leukocytosis, weakness, fatigue, change in appetite, 

unintentional weight loss and darker colored bowel movements suggestive of 

acute GI bleed possibly colonic diverticular with CT abdominal imaging 

suggestive of acute complicated sigmoid diverticulitis with juxtaposed fluid 

phlegmon collection between the urinary bladder and colon. 





EGD evaluation no evidence of bleeding or peptic ulcer disease.


Colonoscopy evaluation cecal polypectomy and transverse polypectomy piecemeal 

fashion.


Current Visit: Yes   Status: Acute   Code(s): K57.92 - DVTRCLI OF INTEST, PART 

UNSP, W/O PERF OR ABSCESS W/O BLEED   SNOMED Code(s): 296969375


   





(2) GI bleed


Narrative/Plan: 


Post colonoscopy with polypectomy 2 passing red burgundy-colored bowel 

movements this morning with a drop in hemoglobin of 6.8 possible post-

polypectomy bleed possible small bowel source.


Current Visit: Yes   Status: Acute   Code(s): K92.2 - GASTROINTESTINAL 

HEMORRHAGE, UNSPECIFIED   SNOMED Code(s): 03258325


   


Plan: 


1.  Clear liquids and advance to full liquids after capsule study is completed.

  Continue to monitor CBC. Continue with Protonix daily.  








Assessment and plan a care discussed with Dr. Rod

## 2018-09-21 NOTE — P.PN
Subjective


Progress Note Date: 09/21/18





78-year-old male seen this morning at bedside currently sitting up in a chair 

being followed by GI service small bowel capsule study in progress patient 

states is anxious to be discharged denies any chest pain shortness of breath 

dizziness or lightheadedness hemoglobin this morning 8.2








 EGD done by GI service which showed no evidence of peptic ulcer disease. 

status post colonoscopy done yesterday for evaluation of hematochezia anemia 

with cecal polypectomy, transverse polypectomy in piecemeal fashion.  








Objective





- Vital Signs


Vital signs: 


 Vital Signs











Temp  97.1 F L  09/21/18 11:29


 


Pulse  81   09/21/18 11:29


 


Resp  20   09/21/18 11:29


 


BP  166/77   09/21/18 11:29


 


Pulse Ox  95   09/21/18 11:29








 Intake & Output











 09/20/18 09/21/18 09/21/18





 18:59 06:59 18:59


 


Intake Total 1190  


 


Output Total 1400 300 


 


Balance -210 -300 


 


Weight  111 kg 


 


Intake:   


 


  Oral 880  


 


  Blood Product 310  


 


    Rc As-1  Unit 310  





    [id number]   


 


Output:   


 


  Urine 1400  


 


  Stool  300 


 


Other:   


 


  Voiding Method Toilet Toilet 





 Urinal Urinal 


 


  # Voids  2 


 


  # Bowel Movements  2 














- Exam





Physical exam


Pleasant 78-year-old male sitting up in a chair states is anxious to go home 

study in progress


Lungs adequate air movement bilaterally on room air no shortness of breath


Heart S1-S2 audible denying chest pain denying chest pain


Abdomen soft not distended nontender.  States abdominal pain resolved urinating 

no difficulty no nausea no vomiting reportedly had 2 burgundy colored stools


Extremities no edema noted.











- Labs


CBC & Chem 7: 


 09/21/18 06:24





 09/21/18 06:24


Labs: 


 Abnormal Lab Results - Last 24 Hours (Table)











  09/19/18 09/21/18 09/21/18 Range/Units





  11:17 06:24 06:24 


 


RBC   2.64 L   (4.30-5.90)  m/uL


 


Hgb   8.2 L   (13.0-17.5)  gm/dL


 


Hct   24.1 L   (39.0-53.0)  %


 


RDW   16.1 H   (11.5-15.5)  %


 


Chloride    116 H  ()  mmol/L


 


Creatinine    1.78 H  (0.66-1.25)  mg/dL


 


Total Protein    5.4 L  (6.3-8.2)  g/dL


 


Albumin    2.5 L  (3.5-5.0)  g/dL


 


Crossmatch  See Detail    














Assessment and Plan


Assessment: 





Impression


Present on admission 2 large bloody stools suspect GI bleed due to acute 

sigmoid diverticulitis


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


Leukocytosis febrile present on admission


Masslike collection per computed tomography scan of the abdomen between sigmoid 

and urinary bladder noted


Patchy opacity left lower lobe


Computed tomography scan abdomen and pelvis show complicated acute sigmoid 

diverticulitis with [id number] phlegmon to the post between the sigmoid and the 

urinary bladder


Present on admission 2 large bloody stools 1 unit packed red blood cells given 

in the emergency room suspect GI bleed


Persistent fever with preliminary blood culture gram-positive cocci in clusters


Component of acute blood loss anemia


Status post colonoscopy done 19th of September Cecal polypectomy with cold snare

, transverse colon polypectomy with cold snare in piecemeal fashion, 

diverticulosis of the sigmoid and descending colon, internal hemorrhoids, poor 

prep








Plan


Continue recommendations by GI service defer to


Per GIs recommendations repeat colonoscopy in 6-12 months given piecemeal 

resection of the transverse colon polyp as well as poor prep


Continue to monitor hemoglobin and transfuse as needed


Per GI okay to restart full liquid diet today and advance to GI soft tomorrow


DVT and GI prophylaxis


GI service small bowel capsule endoscopic 


Continue to follow closely addressing surgical issues as they arise











The above impression and plan of care have been discussed and directed by 

signing physician. Samira Schaeffer nurse practitioner acting as scribe for signing 

physician.

## 2018-09-22 VITALS — SYSTOLIC BLOOD PRESSURE: 171 MMHG | TEMPERATURE: 97.6 F | DIASTOLIC BLOOD PRESSURE: 88 MMHG | HEART RATE: 91 BPM

## 2018-09-22 LAB
ALBUMIN SERPL-MCNC: 2.5 G/DL (ref 3.5–5)
ALP SERPL-CCNC: 86 U/L (ref 38–126)
ALT SERPL-CCNC: 27 U/L (ref 21–72)
ANION GAP SERPL CALC-SCNC: 7 MMOL/L
AST SERPL-CCNC: 23 U/L (ref 17–59)
BASOPHILS # BLD AUTO: 0 K/UL (ref 0–0.2)
BASOPHILS NFR BLD AUTO: 1 %
BUN SERPL-SCNC: 11 MG/DL (ref 9–20)
CALCIUM SPEC-MCNC: 8.3 MG/DL (ref 8.4–10.2)
CHLORIDE SERPL-SCNC: 110 MMOL/L (ref 98–107)
CO2 SERPL-SCNC: 27 MMOL/L (ref 22–30)
EOSINOPHIL # BLD AUTO: 0.3 K/UL (ref 0–0.7)
EOSINOPHIL NFR BLD AUTO: 3 %
ERYTHROCYTE [DISTWIDTH] IN BLOOD BY AUTOMATED COUNT: 2.57 M/UL (ref 4.3–5.9)
ERYTHROCYTE [DISTWIDTH] IN BLOOD: 15.9 % (ref 11.5–15.5)
GLUCOSE SERPL-MCNC: 80 MG/DL (ref 74–99)
HCT VFR BLD AUTO: 24.1 % (ref 39–53)
HGB BLD-MCNC: 8.2 GM/DL (ref 13–17.5)
LYMPHOCYTES # SPEC AUTO: 1.3 K/UL (ref 1–4.8)
LYMPHOCYTES NFR SPEC AUTO: 15 %
MCH RBC QN AUTO: 31.7 PG (ref 25–35)
MCHC RBC AUTO-ENTMCNC: 34 G/DL (ref 31–37)
MCV RBC AUTO: 93.5 FL (ref 80–100)
MONOCYTES # BLD AUTO: 0.5 K/UL (ref 0–1)
MONOCYTES NFR BLD AUTO: 5 %
NEUTROPHILS # BLD AUTO: 6.2 K/UL (ref 1.3–7.7)
NEUTROPHILS NFR BLD AUTO: 74 %
PLATELET # BLD AUTO: 270 K/UL (ref 150–450)
POTASSIUM SERPL-SCNC: 3.3 MMOL/L (ref 3.5–5.1)
PROT SERPL-MCNC: 5.5 G/DL (ref 6.3–8.2)
SODIUM SERPL-SCNC: 144 MMOL/L (ref 137–145)
WBC # BLD AUTO: 8.4 K/UL (ref 3.8–10.6)

## 2018-09-22 RX ADMIN — POTASSIUM CHLORIDE SCH MEQ: 20 TABLET, EXTENDED RELEASE ORAL at 09:39

## 2018-09-22 RX ADMIN — Medication SCH MG: at 06:38

## 2018-09-22 RX ADMIN — PANTOPRAZOLE SODIUM SCH MG: 40 INJECTION, POWDER, FOR SOLUTION INTRAVENOUS at 09:39

## 2018-09-22 RX ADMIN — POTASSIUM CHLORIDE SCH MEQ: 20 TABLET, EXTENDED RELEASE ORAL at 06:38

## 2018-09-22 RX ADMIN — POTASSIUM CHLORIDE SCH MEQ: 20 TABLET, EXTENDED RELEASE ORAL at 11:25

## 2018-09-22 RX ADMIN — CEFAZOLIN SCH: 330 INJECTION, POWDER, FOR SOLUTION INTRAMUSCULAR; INTRAVENOUS at 00:42

## 2018-09-22 RX ADMIN — ERTAPENEM SODIUM SCH MLS/HR: 1 INJECTION, POWDER, LYOPHILIZED, FOR SOLUTION INTRAMUSCULAR; INTRAVENOUS at 09:39

## 2018-09-22 NOTE — P.PN
Subjective


Progress Note Date: 09/22/18





78-year-old  male presents to Hospital feeling weak for relatively 

short period of time before admission, he however developed bloody stool and 

constantly sought medical care.  Upon arrival imaging studies revealed evidence 

of significant diverticulitis with phlegmon in the sigmoid area abutting the 

urinary bladder.  The patient for shall he has no urinary symptoms, no 

hematuria or urinary urgency or dysuria.  He's had no air or feculent material 

in his urine.  The patient is thought to have a known history of diverticulosis 

but has never had severe diverticulitis.  Does not recall any specific recent 

changes before the onset of this difficulty.


Denies any history of high-grade fevers chills rigors or sweats before 

admission.  Denies significant nausea or emesis or prior bouts of hematemesis 

melena or hematochezia before admission.


09/17/2018 patient is feeling better, no further gastrointestinal bleeding.  Is 

having no urinary symptoms and overall is feeling better.  Cultures become 

available and vancomycin has been discontinued.


09/21/2018 is noted the blood cultures contamination and vancomycin was 

discontinued.  However has had further gastrointestinal bleeding, transfusion 

was required has been closely followed by gastroenterology for any further 

therapeutic needs.


09/22/2018 patient continues to feel better.  Being discharged home today.  Has 

no new acute complaints.





Objective





- Vital Signs


Vital signs: 


 Vital Signs











Temp  97.6 F   09/22/18 11:53


 


Pulse  91   09/22/18 11:53


 


Resp  18   09/22/18 11:53


 


BP  171/88   09/22/18 11:53


 


Pulse Ox  96   09/22/18 11:53








 Intake & Output











 09/22/18 09/22/18 09/23/18





 06:59 18:59 06:59


 


Intake Total 240 680 


 


Output Total 2690 980 


 


Balance -2450 -300 


 


Weight 108.7 kg  


 


Intake:   


 


  Oral 240 680 


 


Output:   


 


  Urine 1790 680 


 


  Stool 900 300 


 


Other:   


 


  Voiding Method Toilet Toilet 





 Urinal Urinal 


 


  # Voids 1 2 


 


  # Bowel Movements 0  














- Exam





Pleasant 78-year-old male in no gail distress


HEENT: Anicteric conjunctiva are pink and moist nasal mucosa grossly intact 

without significant lesions, there is no thrush.


Neck: The neck is supple without significant lymphadenopathy or thyromegaly.


Lungs: Good bilateral air entry without significant crackles or wheezing.  

There is no significant bronchial sounds.  There is no egophony or dullness.


Heart: Regular rate and rhythm with an audible S1-S2, no S3 no S4.  There is no 

significant murmur click or rub, PMI was nondisplaced.


Abdomen: Positive bowel sounds soft and nontender without palpable masses or 

organomegaly.  There was no guarding or rebound.


Extremities: The upper extremities have excellent pulses they are symmetric, no 

significant petechiae or telangiectasia.  No splinter hemorrhages were noted.  

The lower extremities are free from significant edema.  The peripheral pulses 

were 2+ and symmetric.


Neuro: Awake alert oriented to person place and time.  There are no acute new 

gross focal sensory motor deficits.








- Labs


CBC & Chem 7: 


 09/22/18 05:28





 09/22/18 05:28


Labs: 


 Abnormal Lab Results - Last 24 Hours (Table)











  09/22/18 09/22/18 Range/Units





  05:28 05:28 


 


RBC  2.57 L   (4.30-5.90)  m/uL


 


Hgb  8.2 L   (13.0-17.5)  gm/dL


 


Hct  24.1 L   (39.0-53.0)  %


 


RDW  15.9 H   (11.5-15.5)  %


 


Potassium   3.3 L  (3.5-5.1)  mmol/L


 


Chloride   110 H  ()  mmol/L


 


Creatinine   1.82 H  (0.66-1.25)  mg/dL


 


Calcium   8.3 L  (8.4-10.2)  mg/dL


 


Total Protein   5.5 L  (6.3-8.2)  g/dL


 


Albumin   2.5 L  (3.5-5.0)  g/dL








 Laboratory Results











WBC  8.4 k/uL (3.8-10.6)   09/22/18  05:28    


 


RBC  2.57 m/uL (4.30-5.90)  L  09/22/18  05:28    


 


Hgb  8.2 gm/dL (13.0-17.5)  L  09/22/18  05:28    


 


Hct  24.1 % (39.0-53.0)  L  09/22/18  05:28    


 


MCV  93.5 fL (80.0-100.0)   09/22/18  05:28    


 


MCH  31.7 pg (25.0-35.0)   09/22/18  05:28    


 


MCHC  34.0 g/dL (31.0-37.0)   09/22/18  05:28    


 


RDW  15.9 % (11.5-15.5)  H  09/22/18  05:28    


 


Plt Count  270 k/uL (150-450)   09/22/18  05:28    


 


Neutrophils %  74 %  09/22/18  05:28    


 


Lymphocytes %  15 %  09/22/18  05:28    


 


Monocytes %  5 %  09/22/18  05:28    


 


Eosinophils %  3 %  09/22/18  05:28    


 


Basophils %  1 %  09/22/18  05:28    


 


Neutrophils #  6.2 k/uL (1.3-7.7)   09/22/18  05:28    


 


Lymphocytes #  1.3 k/uL (1.0-4.8)   09/22/18  05:28    


 


Monocytes #  0.5 k/uL (0-1.0)   09/22/18  05:28    


 


Eosinophils #  0.3 k/uL (0-0.7)   09/22/18  05:28    


 


Basophils #  0.0 k/uL (0-0.2)   09/22/18  05:28    


 


Hypochromasia  Slight   09/22/18  05:28    


 


Anisocytosis  Slight   09/21/18  06:24    


 


PT  11.1 sec (9.0-12.0)   09/15/18  01:28    


 


INR  1.1  (<1.2)   09/15/18  01:28    


 


APTT  20.0 sec (22.0-30.0)  L  09/15/18  01:28    


 


Sodium  144 mmol/L (137-145)   09/22/18  05:28    


 


Potassium  3.3 mmol/L (3.5-5.1)  L  09/22/18  05:28    


 


Chloride  110 mmol/L ()  H  09/22/18  05:28    


 


Carbon Dioxide  27 mmol/L (22-30)   09/22/18  05:28    


 


Anion Gap  7 mmol/L  09/22/18  05:28    


 


BUN  11 mg/dL (9-20)   09/22/18  05:28    


 


Creatinine  1.82 mg/dL (0.66-1.25)  H  09/22/18  05:28    


 


Est GFR (CKD-EPI)AfAm  40  (>60 ml/min/1.73 sqM)   09/22/18  05:28    


 


Est GFR (CKD-EPI)NonAf  35  (>60 ml/min/1.73 sqM)   09/22/18  05:28    


 


Glucose  80 mg/dL (74-99)   09/22/18  05:28    


 


POC Glucose (mg/dL)  103 mg/dL (75-99)  H  09/18/18  01:02    


 


POC Glu Operater ID  Rahul Escobar   09/18/18  01:02    


 


Calcium  8.3 mg/dL (8.4-10.2)  L  09/22/18  05:28    


 


Phosphorus  3.5 mg/dL (2.5-4.5)   09/16/18  04:59    


 


Magnesium  2.2 mg/dL (1.6-2.3)   09/16/18  04:59    


 


Total Bilirubin  0.6 mg/dL (0.2-1.3)   09/22/18  05:28    


 


AST  23 U/L (17-59)   09/22/18  05:28    


 


ALT  27 U/L (21-72)   09/22/18  05:28    


 


Alkaline Phosphatase  86 U/L ()   09/22/18  05:28    


 


Total Creatine Kinase  52 U/L ()  L  09/11/18  14:54    


 


CK-MB (CK-2)  0.6 ng/mL (0.0-2.4)   09/11/18  14:54    


 


CK-MB (CK-2) Rel Index  1.2   09/11/18  14:54    


 


Troponin I  <0.012 ng/mL (0.000-0.034)   09/11/18  14:54    


 


Total Protein  5.5 g/dL (6.3-8.2)  L  09/22/18  05:28    


 


Albumin  2.5 g/dL (3.5-5.0)  L  09/22/18  05:28    


 


Carcinoembryonic Ag  1.2 ng/mL (0.0-4.9)   09/11/18  14:55    


 


Urine Color  Yellow   09/11/18  16:52    


 


Urine Appearance  Clear  (Clear)   09/11/18  16:52    


 


Urine pH  5.5  (5.0-8.0)   09/11/18  16:52    


 


Ur Specific Gravity  >1.050  (1.001-1.035)  H  09/11/18  16:52    


 


Urine Protein  1+  (Negative)  H  09/11/18  16:52    


 


Urine Glucose (UA)  Negative  (Negative)   09/11/18  16:52    


 


Urine Ketones  Negative  (Negative)   09/11/18  16:52    


 


Urine Blood  Small  (Negative)  H  09/11/18  16:52    


 


Urine Nitrite  Negative  (Negative)   09/11/18  16:52    


 


Urine Bilirubin  Negative  (Negative)   09/11/18  16:52    


 


Urine Urobilinogen  <2.0 mg/dL (<2.0)   09/11/18  16:52    


 


Ur Leukocyte Esterase  Trace  (Negative)  H  09/11/18  16:52    


 


Urine RBC  14 /hpf (0-5)  H  09/11/18  16:52    


 


Urine WBC  2 /hpf (0-5)   09/11/18  16:52    


 


Urine Mucus  Moderate /hpf (None)  H  09/11/18  16:52    


 


Vancomycin Trough  22.8 ug/mL  09/18/18  01:10    


 


Blood Type  A Positive   09/19/18  11:17    


 


Blood Type Confirm  A Positive   09/11/18  17:52    


 


Blood Type Recheck  No   09/19/18  11:17    


 


Antibody Screen  NEGATIVE   09/19/18  11:17    


 


Crossmatch  See Detail   09/19/18  11:17    


 


Spec Expiration Date  09/22/2018 - 0009   09/19/18  11:17    








 Microbiology





09/11/18 17:52   Blood   Blood Culture Gram Stain - Final


09/11/18 17:52   Blood   Blood Culture - Final


                            Coagulase Negative Staph


09/11/18 16:52   Urine,Clean Catch   Urine Culture - Final


09/11/18 17:52   Blood   Blood Culture - Final











Assessment and Plan


(1) Diverticulitis


Status: Acute   Code(s): K57.92 - DVTRCLI OF INTEST, PART UNSP, W/O PERF OR 

ABSCESS W/O BLEED   SNOMED Code(s): 335242973


   





(2) GI bleed


Status: Acute   Code(s): K92.2 - GASTROINTESTINAL HEMORRHAGE, UNSPECIFIED   

SNOMED Code(s): 30835071


   





(3) Gram-positive cocci bacteremia


Narrative/Plan: 


Pleasant 78-year-old  male presents to Hospital with gastrointestinal 

bleed and severe abdominal pain which was very concerning and consequently he 

presented to the emergency center.  Computed tomography scan was performed 

revealing evidence of phlegmon between the sigmoid colon and the bladder.  He's 

been seen by surgery and is being monitored at this point in time.  It is not 

thought that an abscesses yet formed and percutaneous drainage is not possible.

  The patient has not had ongoing gastrointestinal bleeding, hemodynamically 

stable and hemoglobin is stable.  He is feeling relatively well.  There is 

evidence of the positive blood culture which will determine our course of 

antibiotic therapy at discharge.  Currently Zosyn and vancomycin are being 

utilized.  If he improves we'll have to determine what her best possible 

options are as far as antibiotic therapy.  Patient may be an excellent 

candidate for outpatient intravenous antibiotic therapy for the treatment of 

this complex infection.  Goal will be to treat the phlegmon, stabilized the 

patient for outpatient endoscopy in the future to evaluate his need for any 

type of surgical intervention in the future.  The leukocytosis is directly 

related to his current diverticulitis and sepsis.





09/17/2018 patient is feeling better.  The blood cultures and is quite was 

negative staph and constantly vancomycin is discontinued.


Patient is definitely feeling better amount of gastric intestinal bleeding 

appears to have improved.  Hemoglobin is stable at 7.1 no plans for further 

transfusion.


Still has some leukocytosis, improved but not resolved it appears related to 

the current intra-abdominal infection with phlegmon related to his 

diverticulitis.  Fortunately is showing some improvements.


With a blood culture being a contamination we can ask for IV access to be 

placed in an plan for outpatient intravenous antibiotic therapy.





09/21/2018 patient continues to have improvement.  The patient is responding to 

the intravenous antibiotic therapy with ertapenem for the intra-abdominal 

phlegmon.  Havingamounts of pain at this point in time other new complaints.  

Was having some further gastrointestinal bleeding he has now had 7 units of 

packed red cells but appears to have stability of his hemoglobin at this point 

in time.  Is being closely monitored by gastroenterology regarding his gastric 

intestinal bleeding.  Patient will transition to the outpatient setting when he 

is more stable.


09/22/2018 the patient has had further improvement.  Will be discharged home 

today.  He will report the [facility] in the morning to complete his course 

of intravenous antibiotic therapy for his phlegmon related to his 

diverticulitis.  He will continue to follow with gastroenterology after 

discharge in regards to planning for follow-up endoscopy.  Follow-up in the 

office at the end of his intravenous antibiotic therapy.


Status: Acute   Code(s): R78.81 - BACTEREMIA   SNOMED Code(s): 281257793773

## 2018-09-22 NOTE — PN
PROGRESS NOTE



DATE OF SERVICE:

09/22/2018



Patient is a 78-year-old pleasant white male admitted to hospital with sigmoid

diverticulitis and GI bleed.  He underwent an upper endoscopy as well as colonoscopy by

Dr. Rod and following the colonoscopy he had hematochezia, which was thought to be

related to post polypectomy GI bleed.  However, that has subsided.  He underwent a

small bowel capsule endoscopy to rule out other small bowel source of bleeding.  In the

meantime, the patient is doing well.  He has no more bleeding, on a clear liquid diet,

tolerating well.  No abdominal pain.  No nausea, vomiting.  Hemoglobin is stable at 9.2

g/dL.



PHYSICAL EXAMINATION:

On physical examination, he appears comfortable.  No apparent distress.  Vital signs

are stable. Blood pressure is 118/56, pulse is 70, temperature 97.

HEENT examination: Unremarkable. Conjunctivae pink. Sclerae anicteric. Oral cavity, no

lesions.

NECK: No JVD or lymph node enlargement.

Chest was clear to auscultation.

HEART:  Regular rate and rhythm.

ABDOMEN:  Soft, was nontender, nondistended.  Bowel sounds are positive EXTREMITIES: No

pedal edema.

SKIN: No rashes.

NEURO:  Alert and oriented x3.  No focal deficits.



LABS:

Labs from today, WBC 8.4, hemoglobin 8.2, platelets are normal.  BUN is 11, creatinine

is 1.82.



IMPRESSION:

1. Acute gastrointestinal bleed, status post EGD and colonoscopy, possible post

    polypectomy bleeding after the colonoscopy, which appears to have resolved.  He did

    have a capsule endoscopy done yesterday to rule out small bowel source of bleeding.

    Results of which are still pending at the time of this dictation.  In any event,

    gastrointestinal  bleed has resolved.  Hemoglobin is stable at 8.2 g/dL.

2. Acute sigmoid diverticulitis, doing well.



RECOMMENDATIONS:

1. Advance diet as tolerated.

2. We will review the capsule endoscopy results today if possible.

3. Since the patient is stable, he can be discharged home today with an outpatient

    follow up with Dr. Rod in 1 week.





MMODL / IJN: [id number] / Job#: [id number]

## 2018-09-22 NOTE — P.DS
Providers


Date of admission: 


09/11/18 17:36





Expected date of discharge: 09/22/18


Attending physician: 


Ezekiel Hector





Consults: 





 





09/11/18 17:34


Consult Physician Routine 


   Consulting Provider: Raquel Walker


   Consult Reason/Comments: massive GI bleed, pelvic inflammation


   Do you want consulting provider notified?: Already Contacted





09/11/18 17:51


Consult Physician Routine 


   Consulting Provider: Praveen Barrios


   Consult Reason/Comments: diverticulitis w phlegmon


   Do you want consulting provider notified?: Already Contacted





09/12/18 09:49


Consult Physician Urgent 


   Consulting Provider: Yulisa Phipps


   Consult Reason/Comments: GIB bleed, diverticulitis


   Do you want consulting provider notified?: Yes





09/13/18 10:09


Consult Physician Routine 


   Consulting Provider: Rito Santana


   Consult Reason/Comments: Diverticulitis with phlegmon


   Do you want consulting provider notified?: Yes





09/15/18 01:25


Consult Physician Stat 


   Consulting Provider: Raquel Walker


   Consult Reason/Comments: ICU Management


   Do you want consulting provider notified?: Yes











Primary care physician: 


[facility] Course: 

















DIAGNOSES ON DISCHARGE:








1 GI bleed secondary to acute sigmoid diverticulitis.  CT of the abdomen and 

pelvis completed showing complicated acute sigmoid diverticulitis with a 8 x 7 

x 5 CM Phlegmon juxtaposed between the sigmoid and the urinary bladder.  Dr. Barrios per surgical services consulted.   EGD performed on 9/14.  EGD showing 

no active bleeding visualized.  Small nonbleeding 6 mm antral polyp.  Mild 

antral gastritis and mild duodenitis.  Ferrous sulfate has been added.  Patient 

having episodes of dark red bowel movements last night.  Hemoglobin dropping to 

6.8.  Patient did receive another unit of packed red blood cells.  Patient has 

received a total of 5 units PRBC cells.  Colonoscopy completed with polypectomy 

2.  Patient did have red burgundy stools throughout night.  Hemoglobin 6.8.  

Patient receiving another unit of packed red blood cells.  Per GI services 

continue clear liquids.  CBCs every 6 hours.  Small bowel capsule 

endoscopically being completed today.  Patient will be advanced for liquid diet 

upon completion.  Hemoglobin 8.2.  We'll continue to monitor closely





2. masslike collection within the abdomen measuring  8 x 7 x 5 cm in size in 

between the sigmoid and the urinary bladder.  GI services have been consulted 

per critical care and CEA ordered.  CEA level 1. per GI services patient will 

benefit from interventional radiology percutaneous drainage but will refer to 

general surgical services at this time.  WBC 11.4.  Patient does have midline 

in place.  Patient will be discharged home on Invanz antibiotic per ID. WBC 

improving to 10.1





3. patchy opacity left lower lobe.  CTA completed showing left lower lobe 5 x 5 

x 3 cm pulmonary consolidative opacity.  Dr. Larson following for pulmonary 

services.  Per pulmonary services patient does not show any signs or symptoms 

of pneumonia at this point.





4. previous history of CVA





5. leukocytosis.  Patient did have elevated temperature on arrival to emergency 

room. Blood and urine cultures have been ordered.  Patient currently on IV 

Zosyn and vancomycin for antibiotics.  Dr. Santana per infectious disease has 

been consulted.  Blood culture showing coagulase-negative staph.  Patient 

remains on Zosyn for IV antibiotics.  Vancomycin has been discontinued per ID.





6. syncope.  CT of head completed showing no acute intracranial hemorrhage or 

midline shift.  There is moderate diffuse age-related cerebral atrophy and 

chronic small vessel ischemic change redemonstrated.  No significant change 

from recent CT





7.  Recent dental work.  Patient was previously taking Augmentin at home.  CTA 

completed showing no significant acute abnormality.  No significant change from 

recent CT of neck study.





8.  Acute kidney injury.  Creatinine increasing to 1.46.  Normal Saline at 100.

  Continue to Monitor closely.  Creatinine increasing to 2.06.  Vancomycin has 

been DC'd per infectious disease.    We'll continue to monitor closely.  

Creatinine trending down 1.78





GI prophylaxis Protonix.  DVT prophylaxis SCDs




















HOSPITAL COURSE:





This is a 78-year-old male patient of Dr. Kinney.  Patient presented to the 

hospital with feeling of increased weakness and dizziness.  Upon arrival to 

emergency room patient had 2 large bloody bowel movements.  Patient has known 

past medical history of CVA and hypertension.  Patient was given 1 unit of 

blood in the emergency room.  Patient was on Augmentin prior to hospitalization 

for dental work.  CT a of the head and neck completed showing no significant 

acute abnormality.  No significant change from recent CT neck study.  Chest x-

ray completed showing chronic changes and cardiomegaly without acute pulmonary 

process.  CT of head completed showing no acute intracranial hemorrhage or 

midline shift.  There is moderate diffuse age-related cerebral atrophy and 

chronic small vessel ischemic changes redemonstrated no significant change from 

recent CT.  CT of abdomen and pelvis completed showing compensated acute 

sigmoid diverticulitis, with a 8 x 7 x 5 cm Phlegmon juxtaposed between the 

sigmoid in the urinary bladder.  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 

consolidative obesity.  If no prior CTs available to ensure stability over time 

the instruments follow-up chest CT is recommended.  Dr. Larson per critical 

care and pulmonary services consulted.  Dr. Barrios per surgical service is 

consulted.  WBC upon arrival 20.1.  Patient currently on Zosyn for IV 

antibiotics.  Hemoglobin 10.7.  Patient did receive 1 unit of PRBCs.  Urine and 

blood cultures have been ordered.  At this time patient denies chest pain or 

shortness of breath.  Patient denies nausea vomiting or diarrhea.  Patient 

denies any urinary burning or frequency.  Abdominal mass felt in lower left 

quadrant.  Discussed case with Dr. Larson per critical care.  Dr. King per GI 

services have been consulted. Carcinoembryonic antigen ordered per Critical 

care.





On 09/13/2018 patient has been moved out of the intensive care unit.  Patient 

is currently alert and oriented 3 resting comfortably in bed denies any 

complaints at this time.  Patient does state he has had a bowel movement that 

appeared dark this a.m.  Patient denies any nausea or vomiting.  Patient denies 

chest pain or shortness of breath.  Denies any urinary burning or frequency.  

Patient is currently on a clear liquid diet per surgical services





On 09/14/2018 per nursing Patient had episode of tarry stool and hemoglobin 

dropped to 7.3.  Patient was symptomatic of low hemoglobin.  Patient received 1 

unit of PRBCs.  Per nursing staff patient will have EGD today.  White blood 

cell increasing to 18.0.  Dr. Santana for ID is following.  Patient maintained 

on vancomycin and Zosyn.





On 09/15/2018 patient is alert and oriented 3 in no apparent distress he 

denies any nausea or vomiting no abdominal pain at this time he had bloody 

bowel movements throughout the night and received 2 units of red blood cell 

transfusion he is feeling better since. He denies any chest pain or shortness 

of breath, no cough no nausea or vomiting and no urinary symptoms.





On 09/16/2018 patient was seen and examined, he is on telemetry floor out of 

the ICU, he denies any abdominal pain, he had 1 bloody bowel movement earlier 

today, otherwise he denies any symptoms there is no fever or chills no headache 

or dizziness no chest pain no shortness of breath no cough no nausea or 

vomiting no abdominal pain and no urinary symptoms.





09/17/2018 patient is currently alert and oriented 3.  Resting comfortably.  

Per nursing staff patient is having bowel movements with minimal blood.  

Patient denies any nausea.  Patient denies any Shortness breath or chest pain.  

Patient denies any urinary symptoms





On 09/18/2018  patient is alert and orientated 3.  Per nursing staff patient 

had increased episodes of dark red bowel movements last night.  Hemoglobin 

dropping to 6.8.  Patient did receive 1 unit PRBCs.  Per GI services planning 

for colonoscopy tomorrow in a.m. at this time patient denies any abdominal 

pain.  Patient denies nausea vomiting or diarrhea.  Patient denies any urinary 

burning or frequency.  Patient denies any chest pain or shortness breath.  

Repeat hemoglobin 7.1





09/19/2018 patient is alert and oriented 3.  Patient going for colonoscopy 

today with Dr. Rod.  Hemoglobin 6.8.  Patient received 1 unit PRBCs.  

Patient denies chest pain or shortness breath.  Denies any nausea vomiting or 

diarrhea.  Patient denies any urinary burning or frequency.





On 09/20/2018 patient remains alert and oriented 3.  Patient did have 

colonoscopy yesterday with Dr. Rod.  Patient did have episode of maroon 

stool last. hemoglobin  this morning 6.8 patient will receive an additional 

unit of PRBCs today.  Patient remains asymptomatic denies nausea vomiting or 

diarrhea.  Denies any abdominal pain.  Patient denies chest pain or shortness 

breath.  Patient denies any urinary burning or frequency





On 09/21/2018 remains alert and oriented 3.  Patient currently getting capsule 

study.  Per GI service is patient to be advanced to full liquid diet after 

completion of capsule study.  CBC to be monitored.  Patient's hemoblobuin this 

AM 8.2.  We'll continue to monitor closely.  Patient remains asymptomatic.  

Patient denies chest pain or shortness of breath.  Patient denies any urinary 

burning or frequency.





On 9/22/2018 patient is alert and oriented 3 feeding well denying any symptoms 

at this time hemoglobin is stable at 8.2 patient had a bowel movement which was 

brown without any evidence of bleeding.  Case discussed this morning with Dr. Santana patient should continue ertapenem IV for 14 more days, arrangement has 

been made at [facility] for IV antibiotic infusion.  Otherwise patient will 

follow-up was gastroenterology within 1 week, he will also follow-up with his 

primary care physician Dr. Kinney within 1 week.


Patient Condition at Discharge: Critical





Plan - Discharge Summary


Discharge Rx Participant: Yes


New Discharge Prescriptions: 


New


   Ertapenem [INVanz] 1 gm IM DAILY #14 vial


   Ferrous Sulfate [Iron (65 MG Elemental)] 325 mg PO BID-W/MEALS  tab


   hydrALAZINE HCL [Apresoline] 25 mg PO TID  tab


   Pantoprazole Sodium [Protonix] 40 mg PO DAILY #30 tablet.dr





Continue


   Atorvastatin [Lipitor] 40 mg PO HS  tab


   Fluticasone Nasal Spray [Flonase Nasal Spray] 1 - 2 spray EA NOSTRIL BID PRN


     PRN Reason: Allergy Symptoms





Discontinued


   Aspirin 81 mg PO DAILY  chew


   Amoxic-Pot Clav 875-125Mg [Augmentin 875-125] 1 tab PO BID


Discharge Medication List





Atorvastatin [Lipitor] 40 mg PO HS  tab 04/22/18 [Rx]


Fluticasone Nasal Spray [Flonase Nasal Spray] 1 - 2 spray EA NOSTRIL BID PRN 09/ 11/18 [History]


Ertapenem [INVanz] 1 gm IM DAILY #14 vial 09/17/18 [Rx]


Ferrous Sulfate [Iron (65 MG Elemental)] 325 mg PO BID-W/MEALS  tab 09/22/18 [Rx

]


Pantoprazole Sodium [Protonix] 40 mg PO DAILY #30 tablet. 09/22/18 [Rx]


hydrALAZINE HCL [Apresoline] 25 mg PO TID  tab 09/22/18 [Rx]








Follow up Appointment(s)/Referral(s): 


Rito Santana MD [STAFF PHYSICIAN] - 2 Weeks


Imelda Kinney MD [Primary Care Provider] - 1-2 days


Raquel Walker MD [STAFF PHYSICIAN] - 1 Week


Juarez Rod MD [STAFF PHYSICIAN] - 10/02/18 1:30 pm


Praveen Barrios MD [STAFF PHYSICIAN] - 1 Week


Ambulatory/Diagnostic Orders: 


Basic Metabolic Panel [LAB.AMB] Location: None Selected


Complete Blood Count w/diff [LAB.AMB] Location: None Selected


Patient Instructions/Handouts:  Gastrointestinal Bleeding (DC), Iron Rich Diet (

DC), Anemia (DC)


Activity/Diet/Wound Care/Special Instructions: 


Report to [facility] Pediatric unit (6th floor) on Sunday 9/23/18 @09:

30 a.m. for first antibiotic infusion.

## 2018-09-22 NOTE — P.PN
Progress Note - Text


Progress Note Date: 09/22/18





The patient resting comfortably in bed.  He has no abdominal pain.  He's had no 

further GI bleed.  His hemoglobin is stable 8.2.





On exam is lesser stable.  His abdomen soft.





Resolving diverticulitis with phlegmon.  Patient has had no further signs of GI 

bleed.  He'll most likely be discharged home today.  He'll follow myself in one 

week.

## 2018-10-15 ENCOUNTER — HOSPITAL ENCOUNTER (OUTPATIENT)
Dept: HOSPITAL 47 - LABPAT | Age: 78
End: 2018-10-15
Attending: SURGERY
Payer: MEDICARE

## 2018-10-15 DIAGNOSIS — Z01.812: Primary | ICD-10-CM

## 2018-10-15 DIAGNOSIS — K57.92: ICD-10-CM

## 2018-10-15 LAB
ANION GAP SERPL CALC-SCNC: 8 MMOL/L
BUN SERPL-SCNC: 16 MG/DL (ref 9–20)
CHLORIDE SERPL-SCNC: 108 MMOL/L (ref 98–107)
CO2 SERPL-SCNC: 26 MMOL/L (ref 22–30)
ERYTHROCYTE [DISTWIDTH] IN BLOOD BY AUTOMATED COUNT: 3.25 M/UL (ref 4.3–5.9)
ERYTHROCYTE [DISTWIDTH] IN BLOOD: 14.4 % (ref 11.5–15.5)
HCT VFR BLD AUTO: 30.3 % (ref 39–53)
HGB BLD-MCNC: 10 GM/DL (ref 13–17.5)
MCH RBC QN AUTO: 30.8 PG (ref 25–35)
MCHC RBC AUTO-ENTMCNC: 33.1 G/DL (ref 31–37)
MCV RBC AUTO: 93.1 FL (ref 80–100)
PLATELET # BLD AUTO: 228 K/UL (ref 150–450)
POTASSIUM SERPL-SCNC: 4.6 MMOL/L (ref 3.5–5.1)
SODIUM SERPL-SCNC: 142 MMOL/L (ref 137–145)
WBC # BLD AUTO: 8.8 K/UL (ref 3.8–10.6)

## 2018-10-15 PROCEDURE — 36415 COLL VENOUS BLD VENIPUNCTURE: CPT

## 2018-10-15 PROCEDURE — 84520 ASSAY OF UREA NITROGEN: CPT

## 2018-10-15 PROCEDURE — 80051 ELECTROLYTE PANEL: CPT

## 2018-10-15 PROCEDURE — 82565 ASSAY OF CREATININE: CPT

## 2018-10-15 PROCEDURE — 85027 COMPLETE CBC AUTOMATED: CPT

## 2018-10-18 ENCOUNTER — HOSPITAL ENCOUNTER (INPATIENT)
Dept: HOSPITAL 47 - 2ORMAIN | Age: 78
LOS: 4 days | Discharge: HOME | DRG: 331 | End: 2018-10-22
Attending: SURGERY | Admitting: SURGERY
Payer: MEDICARE

## 2018-10-18 VITALS — BODY MASS INDEX: 32.3 KG/M2

## 2018-10-18 DIAGNOSIS — D50.9: ICD-10-CM

## 2018-10-18 DIAGNOSIS — I10: ICD-10-CM

## 2018-10-18 DIAGNOSIS — Z87.891: ICD-10-CM

## 2018-10-18 DIAGNOSIS — Z86.73: ICD-10-CM

## 2018-10-18 DIAGNOSIS — Z97.4: ICD-10-CM

## 2018-10-18 DIAGNOSIS — E78.5: ICD-10-CM

## 2018-10-18 DIAGNOSIS — Z98.42: ICD-10-CM

## 2018-10-18 DIAGNOSIS — Z90.49: ICD-10-CM

## 2018-10-18 DIAGNOSIS — K57.32: Primary | ICD-10-CM

## 2018-10-18 DIAGNOSIS — Z79.899: ICD-10-CM

## 2018-10-18 DIAGNOSIS — Z82.49: ICD-10-CM

## 2018-10-18 LAB
ANION GAP SERPL CALC-SCNC: 7 MMOL/L
BASOPHILS # BLD AUTO: 0 K/UL (ref 0–0.2)
BASOPHILS NFR BLD AUTO: 0 %
BUN SERPL-SCNC: 13 MG/DL (ref 9–20)
CALCIUM SPEC-MCNC: 9 MG/DL (ref 8.4–10.2)
CHLORIDE SERPL-SCNC: 109 MMOL/L (ref 98–107)
CO2 SERPL-SCNC: 27 MMOL/L (ref 22–30)
EOSINOPHIL # BLD AUTO: 0 K/UL (ref 0–0.7)
EOSINOPHIL NFR BLD AUTO: 0 %
ERYTHROCYTE [DISTWIDTH] IN BLOOD BY AUTOMATED COUNT: 3.28 M/UL (ref 4.3–5.9)
ERYTHROCYTE [DISTWIDTH] IN BLOOD: 14.3 % (ref 11.5–15.5)
GLUCOSE SERPL-MCNC: 111 MG/DL (ref 74–99)
HCT VFR BLD AUTO: 32.2 % (ref 39–53)
HGB BLD-MCNC: 10.5 GM/DL (ref 13–17.5)
LYMPHOCYTES # SPEC AUTO: 0.9 K/UL (ref 1–4.8)
LYMPHOCYTES NFR SPEC AUTO: 7 %
MCH RBC QN AUTO: 32 PG (ref 25–35)
MCHC RBC AUTO-ENTMCNC: 32.6 G/DL (ref 31–37)
MCV RBC AUTO: 98.2 FL (ref 80–100)
MONOCYTES # BLD AUTO: 0.6 K/UL (ref 0–1)
MONOCYTES NFR BLD AUTO: 4 %
NEUTROPHILS # BLD AUTO: 12.2 K/UL (ref 1.3–7.7)
NEUTROPHILS NFR BLD AUTO: 88 %
PLATELET # BLD AUTO: 224 K/UL (ref 150–450)
POTASSIUM SERPL-SCNC: 4.3 MMOL/L (ref 3.5–5.1)
SODIUM SERPL-SCNC: 143 MMOL/L (ref 137–145)
WBC # BLD AUTO: 13.9 K/UL (ref 3.8–10.6)

## 2018-10-18 PROCEDURE — 88331 PATH CONSLTJ SURG 1 BLK 1SPC: CPT

## 2018-10-18 PROCEDURE — 80048 BASIC METABOLIC PNL TOTAL CA: CPT

## 2018-10-18 PROCEDURE — 88305 TISSUE EXAM BY PATHOLOGIST: CPT

## 2018-10-18 PROCEDURE — 0DTN0ZZ RESECTION OF SIGMOID COLON, OPEN APPROACH: ICD-10-PCS

## 2018-10-18 PROCEDURE — 86900 BLOOD TYPING SEROLOGIC ABO: CPT

## 2018-10-18 PROCEDURE — 85025 COMPLETE CBC W/AUTO DIFF WBC: CPT

## 2018-10-18 PROCEDURE — 86901 BLOOD TYPING SEROLOGIC RH(D): CPT

## 2018-10-18 PROCEDURE — 84132 ASSAY OF SERUM POTASSIUM: CPT

## 2018-10-18 PROCEDURE — 80053 COMPREHEN METABOLIC PANEL: CPT

## 2018-10-18 PROCEDURE — 88307 TISSUE EXAM BY PATHOLOGIST: CPT

## 2018-10-18 PROCEDURE — 86850 RBC ANTIBODY SCREEN: CPT

## 2018-10-18 PROCEDURE — 36415 COLL VENOUS BLD VENIPUNCTURE: CPT

## 2018-10-18 RX ADMIN — DEXTROSE MONOHYDRATE, SODIUM CHLORIDE, AND POTASSIUM CHLORIDE SCH MLS/HR: 50; 4.5; 1.49 INJECTION, SOLUTION INTRAVENOUS at 15:07

## 2018-10-18 RX ADMIN — SODIUM CHLORIDE PRN MLS: 9 INJECTION, SOLUTION INTRAVENOUS at 12:30

## 2018-10-18 RX ADMIN — SODIUM CHLORIDE PRN MLS: 9 INJECTION, SOLUTION INTRAVENOUS at 11:17

## 2018-10-18 RX ADMIN — DEXTROSE MONOHYDRATE, SODIUM CHLORIDE, AND POTASSIUM CHLORIDE SCH MLS/HR: 50; 4.5; 1.49 INJECTION, SOLUTION INTRAVENOUS at 22:40

## 2018-10-18 RX ADMIN — HEPARIN SODIUM SCH UNIT: 5000 INJECTION, SOLUTION INTRAVENOUS; SUBCUTANEOUS at 20:50

## 2018-10-18 RX ADMIN — ATORVASTATIN CALCIUM SCH MG: 40 TABLET, FILM COATED ORAL at 20:50

## 2018-10-18 NOTE — P.OP
Date of Procedure: 10/18/18


Preoperative Diagnosis: 


Diverticulitis


Postoperative Diagnosis: 


Diverticulitis


Procedure(s) Performed: 


Low anterior resection


Anesthesia: AMELIA


Surgeon: Praveen Barrios


Estimated Blood Loss (ml): 25


Pathology: other (Sigmoid colon)


Condition: stable


Disposition: PACU


Description of Procedure: 


The patient's placed on the operative table in supine position.  He received 

general anesthesia.  He was then placed in dorsal lithotomy position.  The 

abdomen was prepped and draped in usual sterile fashion.  The skin was incised 

in the low midline position.  The abdomen was entered.  The Byron wound 

protector is placed in the abdomen.  The abdomen explored.  The sigmoid colon 

was adhered to the abdominal wall on the left side of the lower abdomen.  This 

was dissected free with some blunt dissection.  The area had a peculiar 

appearance.  A portion of the abdominal wall was dissected and sent for frozen 

section.  A frozen section of the area was performed this appeared to be fat 

necrosis.  This point the sigmoid colon was mobilized.  The colon was 

transected proximally and distally with the linear stapler.  And then the 

mesentery of the bowel was divided.  With the Enseal device.  The bowel 

appeared long enough for a side-to-side functional end-to-end staple 

anastomosis.  This was created between the left colon and the rectum.  Using 

the RADHA and TA staplers a side-to-side functional end-to-end stapled vessels 

created.  A 3-0 GI silk was used for a crotch stitch.  The TA staple line was 

oversewn with interrupted 3-0 GI silk sutures.  The abdomen was irrigated.  

There is no bleeding seen.  The fascia was closed with looped #1 PDS suture.  

The skin was closed staples.  Patient was sent to recovery in stable condition.
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
## 2018-10-19 RX ADMIN — HEPARIN SODIUM SCH UNIT: 5000 INJECTION, SOLUTION INTRAVENOUS; SUBCUTANEOUS at 21:20

## 2018-10-19 RX ADMIN — HEPARIN SODIUM SCH UNIT: 5000 INJECTION, SOLUTION INTRAVENOUS; SUBCUTANEOUS at 10:09

## 2018-10-19 RX ADMIN — FAMOTIDINE SCH MG: 20 TABLET, FILM COATED ORAL at 10:09

## 2018-10-19 RX ADMIN — DEXTROSE MONOHYDRATE, SODIUM CHLORIDE, AND POTASSIUM CHLORIDE SCH MLS/HR: 50; 4.5; 1.49 INJECTION, SOLUTION INTRAVENOUS at 14:51

## 2018-10-19 RX ADMIN — ALVIMOPAN SCH MG: 12 CAPSULE ORAL at 21:20

## 2018-10-19 RX ADMIN — SODIUM CHLORIDE PRN MLS/HR: 9 INJECTION, SOLUTION INTRAVENOUS at 21:05

## 2018-10-19 RX ADMIN — ATORVASTATIN CALCIUM SCH MG: 40 TABLET, FILM COATED ORAL at 21:19

## 2018-10-19 RX ADMIN — ALVIMOPAN SCH MG: 12 CAPSULE ORAL at 10:10

## 2018-10-19 RX ADMIN — SODIUM CHLORIDE PRN MLS/HR: 9 INJECTION, SOLUTION INTRAVENOUS at 00:04

## 2018-10-19 RX ADMIN — DEXTROSE MONOHYDRATE, SODIUM CHLORIDE, AND POTASSIUM CHLORIDE SCH MLS/HR: 50; 4.5; 1.49 INJECTION, SOLUTION INTRAVENOUS at 22:19

## 2018-10-19 RX ADMIN — DEXTROSE MONOHYDRATE, SODIUM CHLORIDE, AND POTASSIUM CHLORIDE SCH MLS/HR: 50; 4.5; 1.49 INJECTION, SOLUTION INTRAVENOUS at 05:30

## 2018-10-19 NOTE — P.PN
Subjective


Progress Note Date: 10/19/18





this is a 78-year-old male with a known past medical history of 

diverticulitiswith GI bleeding, strokes, hyperlipidemia and iron deficiency 

anemia. patient also had a masslike collection within the abdomen measuring 8 x 

7 x 5 cm between the sigmoid and urinary bladder.  During his hospitalization 

in September 2018 he was treated with IV antibiotics for 14 days.  He is 

followed by infectious disease.patient underwent lower anterior resection for 

his diverticulitis today with Dr. Barrios.  Patient tolerated surgery well 

with no complications.  Patient lying in bed comfortably.  Reports no pain.  We'

ve been consulted for medical management.  He denies any chest pain or 

shortness breath.  Denies any nausea or vomiting.  Denies any bowel movement 

changes or urinary symptoms.





10/19/2018 patient sitting up at bedside chair.  Pain is controlled.  Denies 

any nausea or vomiting.  Currently on a clear liquid diet.  Patient denies any 

chest pain or shortness of breath.  He has not passed gas or bowel movement 

yet.  Currently has epidural and Salazar catheter in place.  White count 13.9 

hemoglobin 10.5.  Mild tachycardia with a heart rate 109.  Remains on IV fluids





Objective





- Vital Signs


Vital signs: 


 Vital Signs











Temp  97.9 F   10/19/18 00:02


 


Pulse  109 H  10/19/18 00:02


 


Resp  16   10/19/18 00:02


 


BP  120/79   10/19/18 00:02


 


Pulse Ox  98   10/19/18 00:02








 Intake & Output











 10/18/18 10/19/18 10/19/18





 18:59 06:59 18:59


 


Intake Total 1800  400


 


Output Total 1120 250 


 


Balance 680 -250 400


 


Weight 105.233 kg  


 


Intake:   


 


  IV 1800  


 


  Oral   400


 


Output:   


 


  Urine 1090 250 


 


  Estimated Blood Loss 30  


 


Other:   


 


  Voiding Method Indwelling Catheter Indwelling Catheter Indwelling Catheter














- Exam





Head normocephalic


Neck supple


Lungs clear to auscultation bilaterally no wheezing or crackles


Heart regular rate and rhythm S1-S2, no rub or gallop


Abdomen is soft tender incision site.  Hypoactive bowel sounds no 

hepatosplenomegaly


Extremities no edema


Neuro alert and orientated to 3





- Labs


CBC & Chem 7: 


 10/18/18 14:50





 10/18/18 14:50


Labs: 


 Abnormal Lab Results - Last 24 Hours (Table)











  10/18/18 10/18/18 Range/Units





  14:50 14:50 


 


WBC  13.9 H   (3.8-10.6)  k/uL


 


RBC  3.28 L   (4.30-5.90)  m/uL


 


Hgb  10.5 L   (13.0-17.5)  gm/dL


 


Hct  32.2 L   (39.0-53.0)  %


 


Neutrophils #  12.2 H   (1.3-7.7)  k/uL


 


Lymphocytes #  0.9 L   (1.0-4.8)  k/uL


 


Chloride   109 H  ()  mmol/L


 


Glucose   111 H  (74-99)  mg/dL














Assessment and Plan


Assessment: 





1.  Diverticulitis: Status post lower anterior resection.continue with postop 

orders per surgical service





2.  Recent masslike collection or phlegmon between the sigmoid and urinary 

bladder.  requiring hospitalization and completed treatment with IV antibiotics 

outpatient





3.  Essential hypertension: Continue hydralazine





4. hyperlipidemia resume Lipitor





5.  History of CVA 2





6.  Iron deficiency anemia: Hemoglobin 10.5.  Will resume iron supplement when 

bowels become more active





7.  Leukocytosis: Encourage incentive spirometry use.  Possibly reactive from 

surgery.  Continue to monitor.  Repeat labs in a.m.





GI prophylaxis omeprazole and DVT prophylaxis subcu heparin





I performed an examination of the patient and discussed their management with 

the physician Assistant.  I have reviewed the Physician Assistant's notes and 

agree with the documented findings and plan of care

## 2018-10-19 NOTE — P.PN
Progress Note - Text





10/19  719am


78-year-old male status post low anterior resection by Dr. rodriguez.  Patient 

has an epidural catheter for postop pain control with the solution running at 

10 mL an hour.  Patient seen this morning and evaluated for pain control, 

patient has a VAS of 0.  No complains of nausea vomiting or pruritus.  No motor 

or sensory deficits noted.  Plan to continue epidural infusion.

## 2018-10-19 NOTE — P.PN
Subjective


Progress Note Date: 10/19/18





Pleasant 78-year-old male resting in bed epidural in place per anesthesia for 

pain control patient states not passing gas no stool indwelling Salazar catheter 

in place with adequate urine output.  Patient states pain medication effective 

for pain control.  Surgical dressing dry abdomen nondistended soft surgical 

tenderness appropriate





Patient is postop October 18 low anterior resection for diverticulitis





Objective





- Vital Signs


Vital signs: 


 Vital Signs











Temp  97.9 F   10/19/18 00:02


 


Pulse  109 H  10/19/18 00:02


 


Resp  16   10/19/18 00:02


 


BP  120/79   10/19/18 00:02


 


Pulse Ox  98   10/19/18 00:02








 Intake & Output











 10/18/18 10/19/18 10/19/18





 18:59 06:59 18:59


 


Intake Total 1800  400


 


Output Total 1120 250 


 


Balance 680 -250 400


 


Weight 105.233 kg  


 


Intake:   


 


  IV 1800  


 


  Oral   400


 


Output:   


 


  Urine 1090 250 


 


  Estimated Blood Loss 30  


 


Other:   


 


  Voiding Method Indwelling Catheter Indwelling Catheter 














- Exam





Physical exam


70-year-old male resting in bed appears in no acute distress


Lungs adequate air movement bilaterally nasal cannula 2 L sats are 98% no 

shortness of breath noted


Heart S1-S2 audible mildly tachycardic heart rate in 100


Abdomen surgical dressing dry few hypoactive bowel tones surgical tenderness 

appropriate no nausea no vomiting indwelling Salazar catheter in place


Extremities no edema noted





- Labs


CBC & Chem 7: 


 10/18/18 14:50





 10/18/18 14:50


Labs: 


 Abnormal Lab Results - Last 24 Hours (Table)











  10/18/18 10/18/18 Range/Units





  14:50 14:50 


 


WBC  13.9 H   (3.8-10.6)  k/uL


 


RBC  3.28 L   (4.30-5.90)  m/uL


 


Hgb  10.5 L   (13.0-17.5)  gm/dL


 


Hct  32.2 L   (39.0-53.0)  %


 


Neutrophils #  12.2 H   (1.3-7.7)  k/uL


 


Lymphocytes #  0.9 L   (1.0-4.8)  k/uL


 


Chloride   109 H  ()  mmol/L


 


Glucose   111 H  (74-99)  mg/dL














Assessment and Plan


Assessment: 





Impression


Postop low anterior resection done October 18 for diverticulitis


History of diverticulitis


 iron deficiency anemia


History of a prior CVA  


history of a masslike collection within the abdomen measuring 8 x 7 cm between 

the sigmoid and urinary bladder treated with IV antibiotics outpatient setting











Plan


Continue postop surgical care


Anesthesia for pain control epidural in place defer to for management


DVT and GI prophylaxis


Monitor labs


Encourage use of incentive spirometer


Increase activity when appropriate


Indwelling Salazar catheter to be maintained until bowel function resumes











The above impression and plan of care have been discussed and directed by 

signing physician. Samira Schaeffer nurse practitioner acting as scribe for signing 

physician.

## 2018-10-20 LAB
ALBUMIN SERPL-MCNC: 2.8 G/DL (ref 3.5–5)
ALP SERPL-CCNC: 90 U/L (ref 38–126)
ALT SERPL-CCNC: 19 U/L (ref 21–72)
ANION GAP SERPL CALC-SCNC: 6 MMOL/L
AST SERPL-CCNC: 17 U/L (ref 17–59)
BASOPHILS # BLD AUTO: 0 K/UL (ref 0–0.2)
BASOPHILS NFR BLD AUTO: 1 %
BUN SERPL-SCNC: 10 MG/DL (ref 9–20)
CALCIUM SPEC-MCNC: 8.7 MG/DL (ref 8.4–10.2)
CHLORIDE SERPL-SCNC: 108 MMOL/L (ref 98–107)
CO2 SERPL-SCNC: 25 MMOL/L (ref 22–30)
EOSINOPHIL # BLD AUTO: 0.2 K/UL (ref 0–0.7)
EOSINOPHIL NFR BLD AUTO: 3 %
ERYTHROCYTE [DISTWIDTH] IN BLOOD BY AUTOMATED COUNT: 3.07 M/UL (ref 4.3–5.9)
ERYTHROCYTE [DISTWIDTH] IN BLOOD: 14.2 % (ref 11.5–15.5)
GLUCOSE SERPL-MCNC: 86 MG/DL (ref 74–99)
HCT VFR BLD AUTO: 29.6 % (ref 39–53)
HGB BLD-MCNC: 9.4 GM/DL (ref 13–17.5)
LYMPHOCYTES # SPEC AUTO: 1.1 K/UL (ref 1–4.8)
LYMPHOCYTES NFR SPEC AUTO: 13 %
MCH RBC QN AUTO: 30.7 PG (ref 25–35)
MCHC RBC AUTO-ENTMCNC: 31.9 G/DL (ref 31–37)
MCV RBC AUTO: 96.4 FL (ref 80–100)
MONOCYTES # BLD AUTO: 0.6 K/UL (ref 0–1)
MONOCYTES NFR BLD AUTO: 7 %
NEUTROPHILS # BLD AUTO: 6.4 K/UL (ref 1.3–7.7)
NEUTROPHILS NFR BLD AUTO: 76 %
PLATELET # BLD AUTO: 198 K/UL (ref 150–450)
POTASSIUM SERPL-SCNC: 4.4 MMOL/L (ref 3.5–5.1)
PROT SERPL-MCNC: 6.1 G/DL (ref 6.3–8.2)
SODIUM SERPL-SCNC: 139 MMOL/L (ref 137–145)
WBC # BLD AUTO: 8.5 K/UL (ref 3.8–10.6)

## 2018-10-20 RX ADMIN — FAMOTIDINE SCH MG: 20 TABLET, FILM COATED ORAL at 11:44

## 2018-10-20 RX ADMIN — HEPARIN SODIUM SCH UNIT: 5000 INJECTION, SOLUTION INTRAVENOUS; SUBCUTANEOUS at 11:44

## 2018-10-20 RX ADMIN — DEXTROSE MONOHYDRATE, SODIUM CHLORIDE, AND POTASSIUM CHLORIDE SCH MLS/HR: 50; 4.5; 1.49 INJECTION, SOLUTION INTRAVENOUS at 17:42

## 2018-10-20 RX ADMIN — HEPARIN SODIUM SCH UNIT: 5000 INJECTION, SOLUTION INTRAVENOUS; SUBCUTANEOUS at 21:55

## 2018-10-20 RX ADMIN — DEXTROSE MONOHYDRATE, SODIUM CHLORIDE, AND POTASSIUM CHLORIDE SCH MLS/HR: 50; 4.5; 1.49 INJECTION, SOLUTION INTRAVENOUS at 11:44

## 2018-10-20 RX ADMIN — SODIUM CHLORIDE PRN MLS/HR: 9 INJECTION, SOLUTION INTRAVENOUS at 18:24

## 2018-10-20 RX ADMIN — DEXTROSE MONOHYDRATE, SODIUM CHLORIDE, AND POTASSIUM CHLORIDE SCH MLS/HR: 50; 4.5; 1.49 INJECTION, SOLUTION INTRAVENOUS at 22:21

## 2018-10-20 RX ADMIN — ALVIMOPAN SCH MG: 12 CAPSULE ORAL at 21:55

## 2018-10-20 RX ADMIN — DEXTROSE MONOHYDRATE, SODIUM CHLORIDE, AND POTASSIUM CHLORIDE SCH MLS/HR: 50; 4.5; 1.49 INJECTION, SOLUTION INTRAVENOUS at 05:38

## 2018-10-20 RX ADMIN — ALVIMOPAN SCH MG: 12 CAPSULE ORAL at 11:43

## 2018-10-20 RX ADMIN — ATORVASTATIN CALCIUM SCH MG: 40 TABLET, FILM COATED ORAL at 21:55

## 2018-10-20 NOTE — P.PN
Subjective


Progress Note Date: 10/20/18





this is a 78-year-old male with a known past medical history of 

diverticulitiswith GI bleeding, strokes, hyperlipidemia and iron deficiency 

anemia. patient also had a masslike collection within the abdomen measuring 8 x 

7 x 5 cm between the sigmoid and urinary bladder.  During his hospitalization 

in September 2018 he was treated with IV antibiotics for 14 days.  He is 

followed by infectious disease.patient underwent lower anterior resection for 

his diverticulitis today with Dr. Barrios.  Patient tolerated surgery well 

with no complications.  Patient lying in bed comfortably.  Reports no pain.  We'

ve been consulted for medical management.  He denies any chest pain or 

shortness breath.  Denies any nausea or vomiting.  Denies any bowel movement 

changes or urinary symptoms.





10/19/2018 patient sitting up at bedside chair.  Pain is controlled.  Denies 

any nausea or vomiting.  Currently on a clear liquid diet.  Patient denies any 

chest pain or shortness of breath.  He has not passed gas or bowel movement 

yet.  Currently has epidural and Salazar catheter in place.  White count 13.9 

hemoglobin 10.5.  Mild tachycardia with a heart rate 109.  Remains on IV fluids








On 10/20/2018 patient was seen and examined he is alert and oriented 3 there 

is no fever or chills he denies any chest pain shortness of breath or cough 

there is no abdominal pain no nausea or vomiting he has not passed any gas or 

had any bowel movements yet he is tolerating liquid diet well Salazar catheter is 

in





Objective





- Vital Signs


Vital signs: 


 Vital Signs











Temp  98.4 F   10/20/18 08:23


 


Pulse  108 H  10/20/18 08:23


 


Resp  18   10/20/18 08:23


 


BP  138/91   10/20/18 08:23


 


Pulse Ox  98   10/20/18 08:23








 Intake & Output











 10/19/18 10/20/18 10/20/18





 18:59 06:59 18:59


 


Intake Total 400 210.167 


 


Output Total  1000 


 


Balance 400 -789.833 


 


Intake:   


 


  Intake, IV Titration  210.167 





  Amount   


 


    Ropivacaine 250 mg  210.167 





    Hydromorphone (Pf) 5 mg   





    In Sodium Chloride 0.9%   





    200 ml @ Per Protocol   





    EPIDURAL .Q0M PRN Rx#:   





    [id number]   


 


  Oral 400  


 


Output:   


 


  Urine  1000 


 


    Uretheral (Salazar)  1000 


 


Other:   


 


  Voiding Method Indwelling Catheter Indwelling Catheter Indwelling Catheter














- Exam





In general patient is alert and oriented 3 in no apparent distress


HEENT head normocephalic and atraumatic


Neck is supple no JVD no goiter no lymphadenopathy


Chest exam reveals a few scattered rhonchi no wheezing


Cardiac exam reveals regular heart sounds S1 and S2 no gallops no murmurs


Abdomen is soft nontender no organomegaly with normal bowel sounds


Extremity exam reveals no edema no cyanosis or clubbing


Neurological exam reveals no gross focal deficit





- Labs


CBC & Chem 7: 


 10/20/18 07:09





 10/20/18 07:09


Labs: 


 Abnormal Lab Results - Last 24 Hours (Table)











  10/20/18 10/20/18 Range/Units





  07:09 07:09 


 


RBC  3.07 L   (4.30-5.90)  m/uL


 


Hgb  9.4 L   (13.0-17.5)  gm/dL


 


Hct  29.6 L   (39.0-53.0)  %


 


Chloride   108 H  ()  mmol/L


 


ALT   19 L  (21-72)  U/L


 


Total Protein   6.1 L  (6.3-8.2)  g/dL


 


Albumin   2.8 L  (3.5-5.0)  g/dL














Assessment and Plan


Plan: 





1.  Diverticulitis: Status post lower anterior resection.continue with postop 

orders per surgical service





2.  Recent masslike collection or phlegmon between the sigmoid and urinary 

bladder.  requiring hospitalization and completed treatment with IV antibiotics 

outpatient





3.  Essential hypertension: Continue hydralazine





4. hyperlipidemia resume Lipitor





5.  History of CVA 2





6.  Iron deficiency anemia: Hemoglobin 10.5.  Will resume iron supplement when 

bowels become more active





7.  Leukocytosis: Encourage incentive spirometry use.  Possibly reactive from 

surgery.  Continue to monitor.  Repeat labs in a.m.





GI prophylaxis omeprazole and DVT prophylaxis subcu heparin





Medication and labs were reviewed labs ordered for tomorrow morning will follow 

closely

## 2018-10-20 NOTE — P.PN
Subjective


Progress Note Date: 10/20/18


Principal diagnosis: 





Diverticulitis





Patient doing well today.  Pain well-controlled.  He is hungry for more eat.  

He does still somewhat weak.  Denies nausea or vomiting.





Objective





- Vital Signs


Vital signs: 


 Vital Signs











Temp  98.4 F   10/20/18 08:23


 


Pulse  108 H  10/20/18 08:23


 


Resp  18   10/20/18 08:23


 


BP  138/91   10/20/18 08:23


 


Pulse Ox  98   10/20/18 08:23








 Intake & Output











 10/19/18 10/20/18 10/20/18





 18:59 06:59 18:59


 


Intake Total 400 210.167 


 


Output Total  1000 


 


Balance 400 -789.833 


 


Intake:   


 


  Intake, IV Titration  210.167 





  Amount   


 


    Ropivacaine 250 mg  210.167 





    Hydromorphone (Pf) 5 mg   





    In Sodium Chloride 0.9%   





    200 ml @ Per Protocol   





    EPIDURAL .Q0M PRN Rx#:   





    [id number]   


 


  Oral 400  


 


Output:   


 


  Urine  1000 


 


    Uretheral (Salazar)  1000 


 


Other:   


 


  Voiding Method Indwelling Catheter Indwelling Catheter Indwelling Catheter














- Exam





Abdomen: Soft, nondistended, incision clean and dry, mild tenderness





- Labs


CBC & Chem 7: 


 10/20/18 07:09





 10/20/18 07:09


Labs: 


 Abnormal Lab Results - Last 24 Hours (Table)











  10/20/18 10/20/18 Range/Units





  07:09 07:09 


 


RBC  3.07 L   (4.30-5.90)  m/uL


 


Hgb  9.4 L   (13.0-17.5)  gm/dL


 


Hct  29.6 L   (39.0-53.0)  %


 


Chloride   108 H  ()  mmol/L


 


ALT   19 L  (21-72)  U/L


 


Total Protein   6.1 L  (6.3-8.2)  g/dL


 


Albumin   2.8 L  (3.5-5.0)  g/dL














Assessment and Plan


(1) Diverticulitis


Narrative/Plan: 


Patient doing well at this time.  Advance diet to full liquids.  We'll consult 

physical therapy.


Current Visit: Yes   Status: Acute   Code(s): K57.92 - DVTRCLI OF INTEST, PART 

UNSP, W/O PERF OR ABSCESS W/O BLEED   SNOMED Code(s): 533828574

## 2018-10-20 NOTE — P.PN
Progress Note - Text


Progress Note Date: 10/20/18





70-year-old male status post low anterior resection postop day #2, epidural 

catheter day #3.  Patient is doing well VAS is 0/10 in severity.  Patient 

denies any motor or sensory weakness.  Epidural raise at 10 ML's an hour.  

Patient likely discharged in the hospital on Monday.  I discussed with him that 

we'll likely remove epidural catheter tomorrow.  Plan is to continue therapy at 

current settings.

## 2018-10-21 LAB
ALBUMIN SERPL-MCNC: 2.8 G/DL (ref 3.5–5)
ALP SERPL-CCNC: 84 U/L (ref 38–126)
ALT SERPL-CCNC: 31 U/L (ref 21–72)
ANION GAP SERPL CALC-SCNC: 5 MMOL/L
AST SERPL-CCNC: 31 U/L (ref 17–59)
BASOPHILS # BLD AUTO: 0.1 K/UL (ref 0–0.2)
BASOPHILS NFR BLD AUTO: 1 %
BUN SERPL-SCNC: 7 MG/DL (ref 9–20)
CALCIUM SPEC-MCNC: 8.9 MG/DL (ref 8.4–10.2)
CHLORIDE SERPL-SCNC: 108 MMOL/L (ref 98–107)
CO2 SERPL-SCNC: 26 MMOL/L (ref 22–30)
EOSINOPHIL # BLD AUTO: 0.3 K/UL (ref 0–0.7)
EOSINOPHIL NFR BLD AUTO: 4 %
ERYTHROCYTE [DISTWIDTH] IN BLOOD BY AUTOMATED COUNT: 3.46 M/UL (ref 4.3–5.9)
ERYTHROCYTE [DISTWIDTH] IN BLOOD: 14.2 % (ref 11.5–15.5)
GLUCOSE SERPL-MCNC: 92 MG/DL (ref 74–99)
HCT VFR BLD AUTO: 32.9 % (ref 39–53)
HGB BLD-MCNC: 10.8 GM/DL (ref 13–17.5)
LYMPHOCYTES # SPEC AUTO: 1.1 K/UL (ref 1–4.8)
LYMPHOCYTES NFR SPEC AUTO: 15 %
MCH RBC QN AUTO: 31.3 PG (ref 25–35)
MCHC RBC AUTO-ENTMCNC: 32.9 G/DL (ref 31–37)
MCV RBC AUTO: 95.1 FL (ref 80–100)
MONOCYTES # BLD AUTO: 0.5 K/UL (ref 0–1)
MONOCYTES NFR BLD AUTO: 7 %
NEUTROPHILS # BLD AUTO: 5.2 K/UL (ref 1.3–7.7)
NEUTROPHILS NFR BLD AUTO: 71 %
PLATELET # BLD AUTO: 195 K/UL (ref 150–450)
POTASSIUM SERPL-SCNC: 4.3 MMOL/L (ref 3.5–5.1)
PROT SERPL-MCNC: 6.2 G/DL (ref 6.3–8.2)
SODIUM SERPL-SCNC: 139 MMOL/L (ref 137–145)
WBC # BLD AUTO: 7.3 K/UL (ref 3.8–10.6)

## 2018-10-21 RX ADMIN — HEPARIN SODIUM SCH UNIT: 5000 INJECTION, SOLUTION INTRAVENOUS; SUBCUTANEOUS at 21:22

## 2018-10-21 RX ADMIN — DEXTROSE MONOHYDRATE, SODIUM CHLORIDE, AND POTASSIUM CHLORIDE SCH MLS/HR: 50; 4.5; 1.49 INJECTION, SOLUTION INTRAVENOUS at 15:50

## 2018-10-21 RX ADMIN — FAMOTIDINE SCH MG: 20 TABLET, FILM COATED ORAL at 08:16

## 2018-10-21 RX ADMIN — ATORVASTATIN CALCIUM SCH MG: 40 TABLET, FILM COATED ORAL at 21:22

## 2018-10-21 RX ADMIN — HEPARIN SODIUM SCH UNIT: 5000 INJECTION, SOLUTION INTRAVENOUS; SUBCUTANEOUS at 08:16

## 2018-10-21 RX ADMIN — METOPROLOL TARTRATE SCH MG: 25 TABLET, FILM COATED ORAL at 15:50

## 2018-10-21 RX ADMIN — ALVIMOPAN SCH MG: 12 CAPSULE ORAL at 08:16

## 2018-10-21 RX ADMIN — ALVIMOPAN SCH MG: 12 CAPSULE ORAL at 21:22

## 2018-10-21 RX ADMIN — DEXTROSE MONOHYDRATE, SODIUM CHLORIDE, AND POTASSIUM CHLORIDE SCH MLS/HR: 50; 4.5; 1.49 INJECTION, SOLUTION INTRAVENOUS at 05:36

## 2018-10-21 NOTE — P.PN
Subjective


Progress Note Date: 10/21/18


Principal diagnosis: 





Diverticulitis





Patient doing well today.  Salazar catheter and epidural removed.  Tolerating 

full liquids.  No flatus or bowel movement.





Objective





- Vital Signs


Vital signs: 


 Vital Signs











Temp  98.6 F   10/21/18 09:20


 


Pulse  109 H  10/21/18 09:20


 


Resp  18   10/21/18 09:20


 


BP  171/88   10/21/18 09:20


 


Pulse Ox  98   10/21/18 09:20








 Intake & Output











 10/20/18 10/21/18 10/21/18





 18:59 06:59 18:59


 


Intake Total 465.127 5233 


 


Output Total 1400 2050 


 


Balance -686.833 -555 


 


Intake:   


 


  Intake, IV Titration 362.941 8031 





  Amount   


 


    D5-0.45% NaCl with KCl  1375 





    20Meq/l 1,000 ml @ 125   





    mls/hr IV .Q8H [facility] Rx#:   





    [id number]   


 


    Ropivacaine 250 mg 213.167  





    Hydromorphone (Pf) 5 mg   





    In Sodium Chloride 0.9%   





    200 ml @ Per Protocol   





    EPIDURAL .Q0M PRN Rx#:   





    [id number]   


 


  Oral 500 120 


 


Output:   


 


  Urine 1400 2050 


 


    Uretheral (Salazar) 1400 400 


 


Other:   


 


  Voiding Method Indwelling Catheter Indwelling Catheter 














- Exam





Abdomen: Soft, nondistended, incision clean and dry, mild tenderness





- Labs


CBC & Chem 7: 


 10/21/18 07:48





 10/21/18 07:48


Labs: 


 Abnormal Lab Results - Last 24 Hours (Table)











  10/21/18 10/21/18 Range/Units





  07:48 07:48 


 


RBC  3.46 L   (4.30-5.90)  m/uL


 


Hgb  10.8 L   (13.0-17.5)  gm/dL


 


Hct  32.9 L   (39.0-53.0)  %


 


Chloride   108 H  ()  mmol/L


 


BUN   7 L  (9-20)  mg/dL


 


Total Protein   6.2 L  (6.3-8.2)  g/dL


 


Albumin   2.8 L  (3.5-5.0)  g/dL














Assessment and Plan


(1) Diverticulitis


Narrative/Plan: 


Standard full liquids.  Ambulate.  Decrease IV fluid rate.


Current Visit: Yes   Status: Acute   Code(s): K57.92 - DVTRCLI OF INTEST, PART 

UNSP, W/O PERF OR ABSCESS W/O BLEED   SNOMED Code(s): 451137677

## 2018-10-21 NOTE — P.PN
Progress Note - Text


Progress Note Date: 10/21/18





70-year-old male status post low anterior resection postop day #3, epidural 

catheter day #4.  Patient is doing well VAS is 0/10 in severity.  Patient 

denies any motor or sensory weakness.  Epidural raise at 10 ML's an hour.  He 

is to discontinue epidural catheter today.  Instructed floor nurse to remove 

epidural catheter and give subcu heparin 2 hours after removal.

## 2018-10-21 NOTE — P.PN
Subjective


Progress Note Date: 10/21/18





this is a 78-year-old male with a known past medical history of 

diverticulitiswith GI bleeding, strokes, hyperlipidemia and iron deficiency 

anemia. patient also had a masslike collection within the abdomen measuring 8 x 

7 x 5 cm between the sigmoid and urinary bladder.  During his hospitalization 

in September 2018 he was treated with IV antibiotics for 14 days.  He is 

followed by infectious disease.patient underwent lower anterior resection for 

his diverticulitis today with Dr. Barrios.  Patient tolerated surgery well 

with no complications.  Patient lying in bed comfortably.  Reports no pain.  We'

ve been consulted for medical management.  He denies any chest pain or 

shortness breath.  Denies any nausea or vomiting.  Denies any bowel movement 

changes or urinary symptoms.





10/19/2018 patient sitting up at bedside chair.  Pain is controlled.  Denies 

any nausea or vomiting.  Currently on a clear liquid diet.  Patient denies any 

chest pain or shortness of breath.  He has not passed gas or bowel movement 

yet.  Currently has epidural and Salazar catheter in place.  White count 13.9 

hemoglobin 10.5.  Mild tachycardia with a heart rate 109.  Remains on IV fluids








On 10/20/2018 patient was seen and examined he is alert and oriented 3 there 

is no fever or chills he denies any chest pain shortness of breath or cough 

there is no abdominal pain no nausea or vomiting he has not passed any gas or 

had any bowel movements yet he is tolerating liquid diet well Salazar catheter is 

in








On 10/21/2018 patient is alert and oriented 3.  Patient is currently sitting 

up in chair.  Patient denies chest pain or shortness breath.  Patient denies 

nausea vomiting or diarrhea.  Patient denies any urinary burning or frequency.  

Salazar catheter remains in place.  Patient denies bowel movement.  Patient 

remains on full liquid diet.  Blood pressure and heart rate remain elevated 

Lopressor has been added





Objective





- Vital Signs


Vital signs: 


 Vital Signs











Temp  98.6 F   10/21/18 09:20


 


Pulse  109 H  10/21/18 09:20


 


Resp  18   10/21/18 09:20


 


BP  171/88   10/21/18 09:20


 


Pulse Ox  98   10/21/18 09:20








 Intake & Output











 10/20/18 10/21/18 10/21/18





 18:59 06:59 18:59


 


Intake Total 358.269 4977 


 


Output Total 1400 2050 


 


Balance -686.833 -730 


 


Intake:   


 


  Intake, IV Titration 229.718 6288 





  Amount   


 


    D5-0.45% NaCl with KCl  1375 





    20Meq/l 1,000 ml @ 125   





    mls/hr IV .Q8H [facility] Rx#:   





    [id number]   


 


    Ropivacaine 250 mg 213.167  





    Hydromorphone (Pf) 5 mg   





    In Sodium Chloride 0.9%   





    200 ml @ Per Protocol   





    EPIDURAL .Q0M PRN Rx#:   





    [id number]   


 


  Oral 500 120 


 


Output:   


 


  Urine 1400 2050 


 


    Uretheral (Salazar) 1400 400 


 


Other:   


 


  Voiding Method Indwelling Catheter Indwelling Catheter 














- Exam


In general patient is alert and oriented 3 in no apparent distress


HEENT head normocephalic and atraumatic


Neck is supple no JVD no goiter no lymphadenopathy


Chest exam reveals a few scattered rhonchi no wheezing


Cardiac exam reveals regular heart sounds S1 and S2 no gallops no murmurs


Abdomen is soft nontender no organomegaly with normal bowel sounds


Extremity exam reveals no edema no cyanosis or clubbing


Neurological exam reveals no gross focal deficit








- Labs


CBC & Chem 7: 


 10/21/18 07:48





 10/21/18 07:48


Labs: 


 Abnormal Lab Results - Last 24 Hours (Table)











  10/21/18 10/21/18 Range/Units





  07:48 07:48 


 


RBC  3.46 L   (4.30-5.90)  m/uL


 


Hgb  10.8 L   (13.0-17.5)  gm/dL


 


Hct  32.9 L   (39.0-53.0)  %


 


Chloride   108 H  ()  mmol/L


 


BUN   7 L  (9-20)  mg/dL


 


Total Protein   6.2 L  (6.3-8.2)  g/dL


 


Albumin   2.8 L  (3.5-5.0)  g/dL














Assessment and Plan


Assessment: 


1.  Diverticulitis: Status post lower anterior resection.continue with postop 

orders per surgical service.  Per surgical services: Cathetered epidural to be 

removed today.  No flatulence or bowel movement.  Patient maintained on full 

liquid diet





2.  Recent masslike collection or phlegmon between the sigmoid and urinary 

bladder.  requiring hospitalization and completed treatment with IV antibiotics 

outpatient





3.  Essential hypertension: Continue hydralazine.  Lopressor 25 mg has been 

added





4. hyperlipidemia resume Lipitor





5.  History of CVA 2





6.  Iron deficiency anemia: Hemoglobin 10.5.  Will resume iron supplement when 

bowels become more active





7.  Leukocytosis: Encourage incentive spirometry use.  Possibly reactive from 

surgery.  Continue to monitor.  Repeat labs in a.m. WBC 7.3





GI prophylaxis omeprazole and DVT prophylaxis subcu heparin





I performed an examination of the patient and discussed their management with 

the Nurse Practitioner.  I have reviewed the Nurse Practitioner's notes and 

agree with the documented findings and plan of care

## 2018-10-22 VITALS
DIASTOLIC BLOOD PRESSURE: 96 MMHG | TEMPERATURE: 98.3 F | SYSTOLIC BLOOD PRESSURE: 146 MMHG | HEART RATE: 84 BPM | RESPIRATION RATE: 18 BRPM

## 2018-10-22 LAB
ALBUMIN SERPL-MCNC: 3.1 G/DL (ref 3.5–5)
ALP SERPL-CCNC: 104 U/L (ref 38–126)
ALT SERPL-CCNC: 23 U/L (ref 21–72)
ANION GAP SERPL CALC-SCNC: 8 MMOL/L
AST SERPL-CCNC: 24 U/L (ref 17–59)
BASOPHILS # BLD AUTO: 0.1 K/UL (ref 0–0.2)
BASOPHILS NFR BLD AUTO: 1 %
BUN SERPL-SCNC: 7 MG/DL (ref 9–20)
CALCIUM SPEC-MCNC: 9.5 MG/DL (ref 8.4–10.2)
CHLORIDE SERPL-SCNC: 107 MMOL/L (ref 98–107)
CO2 SERPL-SCNC: 27 MMOL/L (ref 22–30)
EOSINOPHIL # BLD AUTO: 0.3 K/UL (ref 0–0.7)
EOSINOPHIL NFR BLD AUTO: 3 %
ERYTHROCYTE [DISTWIDTH] IN BLOOD BY AUTOMATED COUNT: 3.62 M/UL (ref 4.3–5.9)
ERYTHROCYTE [DISTWIDTH] IN BLOOD: 13.9 % (ref 11.5–15.5)
GLUCOSE SERPL-MCNC: 143 MG/DL (ref 74–99)
HCT VFR BLD AUTO: 34.8 % (ref 39–53)
HGB BLD-MCNC: 11.1 GM/DL (ref 13–17.5)
LYMPHOCYTES # SPEC AUTO: 1 K/UL (ref 1–4.8)
LYMPHOCYTES NFR SPEC AUTO: 13 %
MCH RBC QN AUTO: 30.8 PG (ref 25–35)
MCHC RBC AUTO-ENTMCNC: 32 G/DL (ref 31–37)
MCV RBC AUTO: 96.1 FL (ref 80–100)
MONOCYTES # BLD AUTO: 0.4 K/UL (ref 0–1)
MONOCYTES NFR BLD AUTO: 5 %
NEUTROPHILS # BLD AUTO: 6 K/UL (ref 1.3–7.7)
NEUTROPHILS NFR BLD AUTO: 77 %
PLATELET # BLD AUTO: 264 K/UL (ref 150–450)
POTASSIUM SERPL-SCNC: 4.3 MMOL/L (ref 3.5–5.1)
PROT SERPL-MCNC: 6.7 G/DL (ref 6.3–8.2)
SODIUM SERPL-SCNC: 142 MMOL/L (ref 137–145)
WBC # BLD AUTO: 7.9 K/UL (ref 3.8–10.6)

## 2018-10-22 RX ADMIN — ALVIMOPAN SCH MG: 12 CAPSULE ORAL at 07:33

## 2018-10-22 RX ADMIN — METOPROLOL TARTRATE SCH MG: 25 TABLET, FILM COATED ORAL at 07:33

## 2018-10-22 RX ADMIN — DEXTROSE MONOHYDRATE, SODIUM CHLORIDE, AND POTASSIUM CHLORIDE SCH MLS/HR: 50; 4.5; 1.49 INJECTION, SOLUTION INTRAVENOUS at 02:04

## 2018-10-22 RX ADMIN — FAMOTIDINE SCH MG: 20 TABLET, FILM COATED ORAL at 07:33

## 2018-10-22 RX ADMIN — HEPARIN SODIUM SCH UNIT: 5000 INJECTION, SOLUTION INTRAVENOUS; SUBCUTANEOUS at 07:33

## 2018-10-22 NOTE — P.PN
Subjective


Progress Note Date: 10/22/18





this is a 78-year-old male with a known past medical history of 

diverticulitiswith GI bleeding, strokes, hyperlipidemia and iron deficiency 

anemia. patient also had a masslike collection within the abdomen measuring 8 x 

7 x 5 cm between the sigmoid and urinary bladder.  During his hospitalization 

in September 2018 he was treated with IV antibiotics for 14 days.  He is 

followed by infectious disease.patient underwent lower anterior resection for 

his diverticulitis today with Dr. Barrios.  Patient tolerated surgery well 

with no complications.  Patient lying in bed comfortably.  Reports no pain.  We'

ve been consulted for medical management.  He denies any chest pain or 

shortness breath.  Denies any nausea or vomiting.  Denies any bowel movement 

changes or urinary symptoms.





10/19/2018 patient sitting up at bedside chair.  Pain is controlled.  Denies 

any nausea or vomiting.  Currently on a clear liquid diet.  Patient denies any 

chest pain or shortness of breath.  He has not passed gas or bowel movement 

yet.  Currently has epidural and Salazar catheter in place.  White count 13.9 

hemoglobin 10.5.  Mild tachycardia with a heart rate 109.  Remains on IV 

fluidsOn 





10/20/2018 patient was seen and examined he is alert and oriented 3 there is 

no fever or chills he denies any chest pain shortness of breath or cough there 

is no abdominal pain no nausea or vomiting he has not passed any gas or had any 

bowel movements yet he is tolerating liquid diet well Salazar catheter is in





On 10/21/2018 patient is alert and oriented 3.  Patient is currently sitting 

up in chair.  Patient denies chest pain or shortness breath.  Patient denies 

nausea vomiting or diarrhea.  Patient denies any urinary burning or frequency.  

Salazar catheter remains in place.  Patient denies bowel movement.  Patient 

remains on full liquid diet.  Blood pressure and heart rate remain elevated 

Lopressor has been added





10/22/2018 patient's heart rate and blood pressure have shown improvement with 

the Lopressor 25 daily.  He denies any chest pain or shortness of breath.  He 

reports passing gas and has had 2 bowel movements this morning.  Denies any 

nausea or vomiting.  Has been up and ambulating in the hallway.  Anticipating 

discharge later this afternoon once seen by surgical service.














Objective





- Vital Signs


Vital signs: 


 Vital Signs











Temp  98.3 F   10/22/18 08:07


 


Pulse  84   10/22/18 08:07


 


Resp  18   10/22/18 08:07


 


BP  146/96   10/22/18 08:07


 


Pulse Ox  99   10/22/18 08:07








 Intake & Output











 10/21/18 10/22/18 10/22/18





 18:59 06:59 18:59


 


Output Total 700  


 


Balance -700  


 


Output:   


 


  Urine 700  


 


Other:   


 


  Voiding Method  Toilet Toilet


 


  # Voids 1 1 














- Exam





Head normocephalic


Neck supple


Lungs clear to auscultation bilaterally no wheezing or crackles


Heart regular rate and rhythm S1-S2, no rub or gallop


Abdomen is soft tender incision site.  Positive bowel sounds


Extremities no edema


Neuro alert and orientated to 3





- Labs


CBC & Chem 7: 


 10/22/18 08:14





 10/22/18 08:14


Labs: 


 Abnormal Lab Results - Last 24 Hours (Table)











  10/22/18 10/22/18 Range/Units





  08:14 08:14 


 


RBC  3.62 L   (4.30-5.90)  m/uL


 


Hgb  11.1 L   (13.0-17.5)  gm/dL


 


Hct  34.8 L   (39.0-53.0)  %


 


BUN   7 L  (9-20)  mg/dL


 


Glucose   143 H  (74-99)  mg/dL


 


Albumin   3.1 L  (3.5-5.0)  g/dL














Assessment and Plan


Assessment: 





1.  Diverticulitis: Status post lower anterior resection.continue with postop 

orders per surgical service





2.  Recent masslike collection or phlegmon between the sigmoid and urinary 

bladder.  requiring hospitalization and completed treatment with IV antibiotics 

outpatient





3.  Essential hypertension: Continue hydralazine.  Elevated blood pressure in 

sinus tachycardia Lopressor 25 mg daily added.  Blood pressure and heart rate 

have shown improvement





4. hyperlipidemia resume Lipitor





5.  History of CVA 2





6.  Iron deficiency anemia with some amount of acute blood loss anemia 

secondary to surgery.  Hemoglobin is now stable at 11.1.  Anticipating resuming 

iron at time of discharge





7.  Leukocytosis: Encourage incentive spirometry use.  Possibly reactive from 

surgery.  White count has normalized








GI prophylaxis omeprazole and DVT prophylaxis subcu heparin





Patient is medically stable for discharge once cleared by surgical service.  

Will patient follow-up with Dr. Kinney in 1 week





I performed an examination of the patient and discussed their management with 

the physician Assistant.  I have reviewed the Physician Assistant's notes and 

agree with the documented findings and plan of care

## 2018-10-22 NOTE — P.DS
Providers


Date of admission: 


10/18/18 07:18





Expected date of discharge: 10/22/18


Attending physician: 


Praveen Barrios





Consults: 





 





10/18/18 11:24


Consult Physician Routine 


   Consulting Provider: Ezekiel Hector


   Consult Reason/Comments: med manage


   Do you want consulting provider notified?: Yes











Primary care physician: 


[facility] Course: 





78-year-old male who has a past medical history of diverticulitis with a GI 

bleed with prior stroke with iron deficiency anemia.  Also has a masslike 

collection within the abdomen measures 8 x 7 x 5 between the sigmoid and 

urinary bladder.  Patient was hospitalized in September 2018 treated with IV 

antibiotics for 2 weeks.  Patient returned to undergo an elective low anterior 

resection for diverticulitis.  Patient tolerated the surgery and there were no 

postop complications.  Pain medication was effective for pain control.  Patient 

was seen by the dietitian and given instructions on diverticulosis diet.  Diet 

was tolerated.  On the day of discharge patient was up ambulating in the unit 

stated had 2 small bowel movements passing gas no nausea no vomiting and 

tolerating activity.  Patient states is anxious to be discharged home





Impression discharge diagnosis


Postop low anterior resection done October 18 for diverticulitis


History of diverticulitis


 iron deficiency anemia


History of a prior CVA  


history of a masslike collection within the abdomen measuring 8 x 7 cm between 

the sigmoid and urinary bladder treated with IV antibiotics outpatient setting


Hyperlipidemia


Essential hypertension


Recent masslike collection or phlegmon between the sigmoid and urinary bladder.

  requiring hospitalization and completed treatment with IV antibiotics 

outpatient





The above impression and plan of care have been discussed and directed by 

signing physician. Samira Schaeffer nurse practitioner acting as scribe for signing 

physician.





Plan - Discharge Summary


Discharge Rx Participant: Yes


New Discharge Prescriptions: 


New


   Metoprolol Tartrate [Lopressor] 25 mg PO DAILY #30 tab


   HYDROcodone/APAP 5-325MG [Norco 5-325] 1 tab PO Q6HR PRN 3 Days #12 tab


     PRN Reason: Analgesia





Continue


   Fluticasone Nasal Spray [Flonase Nasal Spray] 1 - 2 spray EA NOSTRIL BID PRN


     PRN Reason: Allergy Symptoms


   Ferrous Sulfate [Iron (65 MG Elemental)] 325 mg PO BID-W/MEALS  tab


   hydrALAZINE HCL [Apresoline] 25 mg PO TID  tab


   Atorvastatin [Lipitor] 40 mg PO HS


Discharge Medication List





Fluticasone Nasal Spray [Flonase Nasal Spray] 1 - 2 spray EA NOSTRIL BID PRN 09/ 11/18 [History]


Ferrous Sulfate [Iron (65 MG Elemental)] 325 mg PO BID-W/MEALS  tab 09/22/18 [Rx

]


hydrALAZINE HCL [Apresoline] 25 mg PO TID  tab 09/22/18 [Rx]


Atorvastatin [Lipitor] 40 mg PO HS 10/15/18 [History]


HYDROcodone/APAP 5-325MG [Norco 5-325] 1 tab PO Q6HR PRN 3 Days #12 tab 10/22/

18 [Rx]


Metoprolol Tartrate [Lopressor] 25 mg PO DAILY #30 tab 10/22/18 [Rx]








Follow up Appointment(s)/Referral(s): 


Imelda Kinney MD [Primary Care Provider] - 1 Week


Praveen Barrios MD [STAFF PHYSICIAN] - 1 Week


Activity/Diet/Wound Care/Special Instructions: 


No tub bath for six weeks.


Shower daily. 


No lifting over 10 pounds for the next 4 weeks.


Diet as directed


May use ice packs to surgical site.


No driving while taking narcotic for pain.





Discharge Disposition: HOME SELF-CARE

## 2019-08-09 ENCOUNTER — HOSPITAL ENCOUNTER (OUTPATIENT)
Dept: HOSPITAL 47 - RADCTMAIN | Age: 79
Discharge: HOME | End: 2019-08-09
Payer: MEDICARE

## 2019-08-09 DIAGNOSIS — N20.0: Primary | ICD-10-CM

## 2019-08-09 DIAGNOSIS — J90: ICD-10-CM

## 2019-08-09 LAB — BUN SERPL-SCNC: 16 MG/DL (ref 9–20)

## 2019-08-09 PROCEDURE — 74178 CT ABD&PLV WO CNTR FLWD CNTR: CPT

## 2019-08-09 PROCEDURE — 84520 ASSAY OF UREA NITROGEN: CPT

## 2019-08-09 PROCEDURE — 74400 UROGRAPHY IV +-KUB TOMOG: CPT

## 2019-08-09 PROCEDURE — 82565 ASSAY OF CREATININE: CPT

## 2019-08-09 PROCEDURE — 36415 COLL VENOUS BLD VENIPUNCTURE: CPT

## 2019-08-09 NOTE — CT
EXAMINATION TYPE: CT urogram wo/w con

 

DATE OF EXAM: 8/9/2019

 

COMPARISON: CT abdomen and pelvis September 11, 2018

 

HISTORY: Hematuria, microscopic

 

CT DLP: 3560 mGycm, Automated Exposure Control for Dose Reduction was Utilized.

 

CONTRAST: 

CT scan of the abdomen and pelvis is performed without oral and without and with IV Contrast, patient
 injected with 100 ml mL of Isovue 370. Urogram protocol with 3-D reconstructed images created on a [employer] workstation and reviewed.

 

FINDINGS:

 

KUB: Noncontrast images show 2 to 3 mm calculus lower pole level left kidney coronal image 90 series 
4 not clearly seen on prior study but likely obscured by excreted contrast. No right-sided nephrolith
iasis. There is diminished size to both kidneys with cortical thinning.

 

Postcontrast images show symmetric respiratory uptake and excretion without hydronephrosis bilaterall
y. There are several simple-appearing thin-walled cysts scattered throughout both kidneys, largest is
 anteriorly midpole of the right kidney measuring approximately 7.2 x 4.7 cm axial image 41 series 9.
 Visualized portion of both ureters show no obvious filling defect or calculus. Bladder is poorly dis
tended without intraluminal mass. Mild wall thickening anteriorly is present. No intraluminal calculu
s is seen.

 

LUNG BASES: Their is partial visualization of at least moderate left-sided pleural effusion with some
 compressive atelectasis filling nearly entire visualized left lung base. Follow-up advised.

 

LIVER/GB: Simple appearing roughly 1.0 cm cyst left hepatic lobe series 3 image 15 is redemonstrated 
and stable cholecystectomy clips are redemonstrated.

 

PANCREAS:  No significant abnormality is seen.

 

SPLEEN:  No significant abnormality is seen.

 

ADRENALS:  No significant abnormality is seen.

 

KIDNEYS:  No significant abnormality is seen.

 

BOWEL: Interval bowel surgery with no sutures of sigmoid colon level axial series 14 image 65. Some s
cattered colonic diverticula most prominent in the sigmoid colon remain present. No bowel obstruction
..

 

PROSTATE/SEMINAL VESICLES: Prostate gland is slightly enlarged in size bulging of bladder base suspic
ious for underlying BPH.

 

LYMPH NODES:  No greater than 1cm abdominal or pelvic lymph nodes are appreciated.

 

OSSEOUS STRUCTURES: Multilevel spurring and facet arthropathy of the spine. No suspicious sclerosis i
nvolving the T10 vertebra diffusely and portions of the T9 vertebra are identified. There is addition
al subtle sclerotic focus right iliac bone level posteriorly image 61 series 9 new from prior exam. M
oderate narrowing bilateral hip joints incidentally noted.

 

OTHER:  No significant additional abnormality is seen.

 

IMPRESSION:

1. There is 2 to 3 mm nonobstructing calculus lower pole left kidney may be accounting for patient's 
symptoms.

2. More importantly there is partial visualization of at least moderate left-sided pleural effusion. 
There are no suspicious sclerotic osseous lesions worrisome for osseous metastatic disease. Further c
linical workup advised.

 

Results communicated to ordering physician office via telephone at time of dictation.

## 2019-08-21 ENCOUNTER — HOSPITAL ENCOUNTER (OUTPATIENT)
Dept: HOSPITAL 47 - RADCTMAIN | Age: 79
Discharge: HOME | End: 2019-08-21
Attending: FAMILY MEDICINE
Payer: MEDICARE

## 2019-08-21 ENCOUNTER — HOSPITAL ENCOUNTER (INPATIENT)
Dept: HOSPITAL 47 - EC | Age: 79
LOS: 10 days | Discharge: HOME | DRG: 166 | End: 2019-08-31
Attending: FAMILY MEDICINE | Admitting: FAMILY MEDICINE
Payer: MEDICARE

## 2019-08-21 VITALS — BODY MASS INDEX: 31.4 KG/M2

## 2019-08-21 DIAGNOSIS — I10: ICD-10-CM

## 2019-08-21 DIAGNOSIS — Z82.49: ICD-10-CM

## 2019-08-21 DIAGNOSIS — J90: ICD-10-CM

## 2019-08-21 DIAGNOSIS — E11.9: ICD-10-CM

## 2019-08-21 DIAGNOSIS — Z79.899: ICD-10-CM

## 2019-08-21 DIAGNOSIS — J86.9: ICD-10-CM

## 2019-08-21 DIAGNOSIS — E78.5: ICD-10-CM

## 2019-08-21 DIAGNOSIS — Z86.73: ICD-10-CM

## 2019-08-21 DIAGNOSIS — D72.829: ICD-10-CM

## 2019-08-21 DIAGNOSIS — Z87.442: ICD-10-CM

## 2019-08-21 DIAGNOSIS — H91.90: ICD-10-CM

## 2019-08-21 DIAGNOSIS — J98.19: ICD-10-CM

## 2019-08-21 DIAGNOSIS — Z87.891: ICD-10-CM

## 2019-08-21 DIAGNOSIS — J98.19: Primary | ICD-10-CM

## 2019-08-21 DIAGNOSIS — J90: Primary | ICD-10-CM

## 2019-08-21 DIAGNOSIS — I71.2: ICD-10-CM

## 2019-08-21 LAB
ALBUMIN SERPL-MCNC: 4.2 G/DL (ref 3.5–5)
ALP SERPL-CCNC: 262 U/L (ref 38–126)
ALT SERPL-CCNC: 13 U/L (ref 21–72)
ANION GAP SERPL CALC-SCNC: 12 MMOL/L
APTT BLD: 22.6 SEC (ref 22–30)
AST SERPL-CCNC: 20 U/L (ref 17–59)
BASOPHILS # BLD AUTO: 0.1 K/UL (ref 0–0.2)
BASOPHILS NFR BLD AUTO: 1 %
BUN SERPL-SCNC: 17 MG/DL (ref 9–20)
BUN SERPL-SCNC: 18 MG/DL (ref 9–20)
CALCIUM SPEC-MCNC: 9.6 MG/DL (ref 8.4–10.2)
CELL CNT PNL FLD: 100
CHLORIDE SERPL-SCNC: 102 MMOL/L (ref 98–107)
CK SERPL-CCNC: 54 U/L (ref 55–170)
CO2 SERPL-SCNC: 27 MMOL/L (ref 22–30)
DEPRECATED POLYS # FLD: 4 %
EOSINOPHIL # BLD AUTO: 0.2 K/UL (ref 0–0.7)
EOSINOPHIL NFR BLD AUTO: 2 %
ERYTHROCYTE [DISTWIDTH] IN BLOOD BY AUTOMATED COUNT: 4.78 M/UL (ref 4.3–5.9)
ERYTHROCYTE [DISTWIDTH] IN BLOOD: 14 % (ref 11.5–15.5)
GLUCOSE BLD-MCNC: 198 MG/DL (ref 75–99)
GLUCOSE SERPL-MCNC: 89 MG/DL (ref 74–99)
HCT VFR BLD AUTO: 42.8 % (ref 39–53)
HGB BLD-MCNC: 13.8 GM/DL (ref 13–17.5)
INR PPP: 0.9 (ref ?–1.2)
LYMPHOCYTES # SPEC AUTO: 1.1 K/UL (ref 1–4.8)
LYMPHOCYTES NFR SPEC AUTO: 12 %
MAGNESIUM SPEC-SCNC: 2.1 MG/DL (ref 1.6–2.3)
MCH RBC QN AUTO: 29 PG (ref 25–35)
MCHC RBC AUTO-ENTMCNC: 32.3 G/DL (ref 31–37)
MCV RBC AUTO: 89.6 FL (ref 80–100)
MONOCYTES # BLD AUTO: 0.6 K/UL (ref 0–1)
MONOCYTES NFR BLD AUTO: 6 %
MONONUC CELLS # FLD: 92 %
NEUTROPHILS # BLD AUTO: 7 K/UL (ref 1.3–7.7)
NEUTROPHILS NFR BLD AUTO: 76 %
NUC CELL # FLD: 6300 /UL
PLATELET # BLD AUTO: 258 K/UL (ref 150–450)
POTASSIUM SERPL-SCNC: 3.9 MMOL/L (ref 3.5–5.1)
PROT SERPL-MCNC: 8.5 G/DL (ref 6.3–8.2)
PT BLD: 10.2 SEC (ref 9–12)
SODIUM SERPL-SCNC: 141 MMOL/L (ref 137–145)
WBC # BLD AUTO: 9.1 K/UL (ref 3.8–10.6)

## 2019-08-21 PROCEDURE — 32551 INSERTION OF CHEST TUBE: CPT

## 2019-08-21 PROCEDURE — 87075 CULTR BACTERIA EXCEPT BLOOD: CPT

## 2019-08-21 PROCEDURE — 85025 COMPLETE CBC W/AUTO DIFF WBC: CPT

## 2019-08-21 PROCEDURE — 87252 VIRUS INOCULATION TISSUE: CPT

## 2019-08-21 PROCEDURE — 96374 THER/PROPH/DIAG INJ IV PUSH: CPT

## 2019-08-21 PROCEDURE — 36415 COLL VENOUS BLD VENIPUNCTURE: CPT

## 2019-08-21 PROCEDURE — 87634 RSV DNA/RNA AMP PROBE: CPT

## 2019-08-21 PROCEDURE — 84520 ASSAY OF UREA NITROGEN: CPT

## 2019-08-21 PROCEDURE — 87498 ENTEROVIRUS PROBE&REVRS TRNS: CPT

## 2019-08-21 PROCEDURE — 87205 SMEAR GRAM STAIN: CPT

## 2019-08-21 PROCEDURE — 99285 EMERGENCY DEPT VISIT HI MDM: CPT

## 2019-08-21 PROCEDURE — 87798 DETECT AGENT NOS DNA AMP: CPT

## 2019-08-21 PROCEDURE — 85730 THROMBOPLASTIN TIME PARTIAL: CPT

## 2019-08-21 PROCEDURE — 82565 ASSAY OF CREATININE: CPT

## 2019-08-21 PROCEDURE — 71046 X-RAY EXAM CHEST 2 VIEWS: CPT

## 2019-08-21 PROCEDURE — 0W9B3ZZ DRAINAGE OF LEFT PLEURAL CAVITY, PERCUTANEOUS APPROACH: ICD-10-PCS

## 2019-08-21 PROCEDURE — 85652 RBC SED RATE AUTOMATED: CPT

## 2019-08-21 PROCEDURE — 71045 X-RAY EXAM CHEST 1 VIEW: CPT

## 2019-08-21 PROCEDURE — 96375 TX/PRO/DX INJ NEW DRUG ADDON: CPT

## 2019-08-21 PROCEDURE — 80053 COMPREHEN METABOLIC PANEL: CPT

## 2019-08-21 PROCEDURE — 76937 US GUIDE VASCULAR ACCESS: CPT

## 2019-08-21 PROCEDURE — 86431 RHEUMATOID FACTOR QUANT: CPT

## 2019-08-21 PROCEDURE — 93005 ELECTROCARDIOGRAM TRACING: CPT

## 2019-08-21 PROCEDURE — 71260 CT THORAX DX C+: CPT

## 2019-08-21 PROCEDURE — 80048 BASIC METABOLIC PNL TOTAL CA: CPT

## 2019-08-21 PROCEDURE — 87496 CYTOMEG DNA AMP PROBE: CPT

## 2019-08-21 PROCEDURE — 87502 INFLUENZA DNA AMP PROBE: CPT

## 2019-08-21 PROCEDURE — 84484 ASSAY OF TROPONIN QUANT: CPT

## 2019-08-21 PROCEDURE — 84157 ASSAY OF PROTEIN OTHER: CPT

## 2019-08-21 PROCEDURE — 82945 GLUCOSE OTHER FLUID: CPT

## 2019-08-21 PROCEDURE — 76942 ECHO GUIDE FOR BIOPSY: CPT

## 2019-08-21 PROCEDURE — 83735 ASSAY OF MAGNESIUM: CPT

## 2019-08-21 PROCEDURE — 85027 COMPLETE CBC AUTOMATED: CPT

## 2019-08-21 PROCEDURE — 87206 SMEAR FLUORESCENT/ACID STAI: CPT

## 2019-08-21 PROCEDURE — 85610 PROTHROMBIN TIME: CPT

## 2019-08-21 PROCEDURE — 82247 BILIRUBIN TOTAL: CPT

## 2019-08-21 PROCEDURE — 89050 BODY FLUID CELL COUNT: CPT

## 2019-08-21 PROCEDURE — 82150 ASSAY OF AMYLASE: CPT

## 2019-08-21 PROCEDURE — 86200 CCP ANTIBODY: CPT

## 2019-08-21 PROCEDURE — 31624 DX BRONCHOSCOPE/LAVAGE: CPT

## 2019-08-21 PROCEDURE — 88305 TISSUE EXAM BY PATHOLOGIST: CPT

## 2019-08-21 PROCEDURE — 87529 HSV DNA AMP PROBE: CPT

## 2019-08-21 PROCEDURE — 80202 ASSAY OF VANCOMYCIN: CPT

## 2019-08-21 PROCEDURE — 83615 LACTATE (LD) (LDH) ENZYME: CPT

## 2019-08-21 PROCEDURE — 88108 CYTOPATH CONCENTRATE TECH: CPT

## 2019-08-21 PROCEDURE — 36410 VNPNXR 3YR/> PHY/QHP DX/THER: CPT

## 2019-08-21 PROCEDURE — 76604 US EXAM CHEST: CPT

## 2019-08-21 PROCEDURE — 87116 MYCOBACTERIA CULTURE: CPT

## 2019-08-21 PROCEDURE — 82550 ASSAY OF CK (CPK): CPT

## 2019-08-21 PROCEDURE — 96361 HYDRATE IV INFUSION ADD-ON: CPT

## 2019-08-21 PROCEDURE — 87070 CULTURE OTHR SPECIMN AEROBIC: CPT

## 2019-08-21 PROCEDURE — 87102 FUNGUS ISOLATION CULTURE: CPT

## 2019-08-21 PROCEDURE — 83880 ASSAY OF NATRIURETIC PEPTIDE: CPT

## 2019-08-21 RX ADMIN — METHYLPREDNISOLONE SODIUM SUCCINATE SCH MG: 125 INJECTION, POWDER, FOR SOLUTION INTRAMUSCULAR; INTRAVENOUS at 23:27

## 2019-08-21 RX ADMIN — INSULIN ASPART SCH UNIT: 100 INJECTION, SOLUTION INTRAVENOUS; SUBCUTANEOUS at 20:39

## 2019-08-21 RX ADMIN — METHYLPREDNISOLONE SODIUM SUCCINATE SCH MG: 125 INJECTION, POWDER, FOR SOLUTION INTRAMUSCULAR; INTRAVENOUS at 17:55

## 2019-08-21 RX ADMIN — CEFAZOLIN SCH MLS/HR: 330 INJECTION, POWDER, FOR SOLUTION INTRAMUSCULAR; INTRAVENOUS at 15:14

## 2019-08-21 NOTE — XR
EXAMINATION TYPE: XR chest 2V

 

DATE OF EXAM: 8/21/2019

 

COMPARISON: Chest CT done same date

 

HISTORY: Difficulty breathing

 

TECHNIQUE:  Frontal and lateral views of the chest are obtained.

 

FINDINGS:  Near complete opacification noted in the left hemithorax as on patient's chest CT. Patient
 is rotated. Heart is obscured. No evident pneumothorax.

 

IMPRESSION:  Large left pleural effusion and associated atelectasis versus pneumonia. There may be un
derlying mass.

## 2019-08-21 NOTE — CT
EXAMINATION TYPE: CT chest w con

 

DATE OF EXAM: 8/21/2019

 

COMPARISON: None

 

HISTORY: Lung nodule

 

CT DLP: 594.5 mGycm

Automated exposure control for dose reduction was used.

 

CONTRAST: 

CT scan of the chest is performed with IV Contrast, patient injected with 100 mL of Isovue 300.

 

FINDINGS:

 

LUNGS: Severe collapse of the left lung with the near complete filling of the left-sided pleural spac
e with massive pleural effusion. Only a small amount of aerated lung is seen within the left lung ape
x. Underlying mass is difficult to exclude. There appears to be obstruction of the left upper and low
er lobe bronchi. The right lung demonstrate some mild peripheral subpleural fibrosis. No nodule or ma
ss is identified. No right-sided infiltrate is seen.

 

MEDIASTINUM: There are no greater than 1 cm hilar or mediastinal lymph nodes.   No pericardial effusi
on is seen. Ascending thoracic aortic aneurysm of 4.7 cm AP dimension. The heart is not enlarged.

 

UPPER ABDOMEN: 1.3 cm hypoattenuating lesion lobe may reflect a small cyst.

 

OTHER:  No additional significant abnormality is seen.

 

IMPRESSION:

1.Severe collapse of the left lung with the near complete filling of the left-sided pleural space wit
h massive pleural effusion. Only a small amount of aerated lung is seen within the left lung apex. Un
derlying mass is difficult to exclude.

2. Ascending thoracic aortic aneurysm.
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
## 2019-08-21 NOTE — OP
OPERATIVE REPORT



OPERATION:

Left-sided thoracentesis.



PREOPERATIVE DIAGNOSIS:

Large left pleural effusion.



POSTOPERATIVE DIAGNOSIS:

Large left pleural effusion.



ANESTHESIA USED:

Lidocaine 1%, 2 mL.



PROCEDURE DESCRIPTION:

The patient was placed in a sitting-upright position. The area below the left scapula

was prepared in a sterile fashion and drapes were applied.  At the level of the eighth

intercostal space and tip of the scapula, the area was locally anesthetized, and a 26-

gauge needle was inserted at the same site, advanced into the pleural space.  Fluid was

localized with a needle.  Then a small tiny incision was made at the same site. The

needle and the catheter of the thoracentesis tray were inserted at the same site,

advanced into the pleural space.  Fluid was obtained, and as soon as the fluid was

obtained the catheter was advanced out of the needle into the pleural space and the

needle was pulled out of the pleural space.  Freely flowing fluid was removed; roughly

2000 mL of dark green-colored fluid drained from the left pleural space.  The patient

was having some discomfort at the end of the procedure around 2000 mL of fluid

drainage. Then the needle was pulled out of the pleural space. No evidence of any

immediate complications noted on the chest x-ray, but he continued to have fairly good

amount of pleural effusion even after draining 2000 mL of fluid from the left pleural

space.  Fluid was sent for different diagnostic studies.  Chest x-ray was reviewed

postoperatively.





MMODL / IJN: [id number] / Job#: [id number]
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
## 2019-08-21 NOTE — XR
EXAMINATION TYPE: XR chest 1V portable

 

DATE OF EXAM: 8/21/2019

 

COMPARISON: Prior chest x-ray same dated earlier time

 

HISTORY: Status post left thoracentesis

 

TECHNIQUE: Single frontal view of the chest is obtained.

 

FINDINGS:  There is some improvement in aeration at the upper lobe. Large amount of retained fluid pe
rsists. No evident pneumothorax. Heart is obscured.

 

IMPRESSION:  No evident complication status post thoracentesis.

## 2019-08-22 LAB
CCP IGG SERPL-ACNC: 0.8 U/ML
GLUCOSE BLD-MCNC: 120 MG/DL (ref 75–99)
GLUCOSE BLD-MCNC: 123 MG/DL (ref 75–99)
GLUCOSE BLD-MCNC: 139 MG/DL (ref 75–99)
GLUCOSE BLD-MCNC: 141 MG/DL (ref 75–99)
GLUCOSE BLD-MCNC: 149 MG/DL (ref 75–99)
PROT FLD-MCNC: 6640 MG/DL

## 2019-08-22 RX ADMIN — INSULIN ASPART SCH: 100 INJECTION, SOLUTION INTRAVENOUS; SUBCUTANEOUS at 18:32

## 2019-08-22 RX ADMIN — CEFAZOLIN SCH MLS/HR: 330 INJECTION, POWDER, FOR SOLUTION INTRAMUSCULAR; INTRAVENOUS at 13:23

## 2019-08-22 RX ADMIN — METHYLPREDNISOLONE SODIUM SUCCINATE SCH MG: 125 INJECTION, POWDER, FOR SOLUTION INTRAMUSCULAR; INTRAVENOUS at 18:41

## 2019-08-22 RX ADMIN — INSULIN ASPART SCH: 100 INJECTION, SOLUTION INTRAVENOUS; SUBCUTANEOUS at 12:12

## 2019-08-22 RX ADMIN — METOPROLOL SUCCINATE SCH MG: 50 TABLET, EXTENDED RELEASE ORAL at 08:22

## 2019-08-22 RX ADMIN — METHYLPREDNISOLONE SODIUM SUCCINATE SCH MG: 125 INJECTION, POWDER, FOR SOLUTION INTRAMUSCULAR; INTRAVENOUS at 12:13

## 2019-08-22 RX ADMIN — PIPERACILLIN AND TAZOBACTAM SCH MLS/HR: 3; .375 INJECTION, POWDER, FOR SOLUTION INTRAVENOUS at 22:03

## 2019-08-22 RX ADMIN — INSULIN ASPART SCH UNIT: 100 INJECTION, SOLUTION INTRAVENOUS; SUBCUTANEOUS at 22:03

## 2019-08-22 RX ADMIN — PIPERACILLIN AND TAZOBACTAM SCH MLS/HR: 3; .375 INJECTION, POWDER, FOR SOLUTION INTRAVENOUS at 13:14

## 2019-08-22 RX ADMIN — INSULIN ASPART SCH UNIT: 100 INJECTION, SOLUTION INTRAVENOUS; SUBCUTANEOUS at 08:23

## 2019-08-22 RX ADMIN — METHYLPREDNISOLONE SODIUM SUCCINATE SCH MG: 125 INJECTION, POWDER, FOR SOLUTION INTRAMUSCULAR; INTRAVENOUS at 06:07
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
## 2019-08-22 NOTE — US
EXAMINATION TYPE: US guided chest tube insertion

 

DATE OF EXAM: 8/22/2019

 

COMPARISON: Ultrasound same day

 

HISTORY: Hydropneumothorax, pleural effusion.

 

PROCEDURE:The left posterior chest was evaluated with ultrasound and the skin overlying a suitable pa
th to the pleural effusion was localized and the overlying skin prepped and draped. Lidocaine used fo
r local anesthesia. Skin nick made with a scalpel. 21-gauge needle was advanced and dark-colored flui
d aspirated in the hub of the needle. A 0.018 inch wire was advanced. Access site was dilated with a 
transitional dilator. A 0.038 inch wire was advanced. Access site was dilated and subsequently an 8.5
 [place] catheter was advanced and fixed in place within the pleural space. Catheter drained dark-colo
red fluid. Catheter was attached to one-way valve and fixed in place with sterile dressing. No immedi
ate consultation.  Hemostasis achieved.  Patient remained in stable condition.  Post procedure chest 
x-ray pending. 

 

IMPRESSION: Status post ultrasound left chest tube insertion.  This procedure performed by the unders
igned

## 2019-08-22 NOTE — XR
EXAMINATION TYPE: XR chest 1V portable

 

DATE OF EXAM: 8/22/2019

 

COMPARISON: Chest x-ray 8/22/2019

 

HISTORY: Status post chest tube placement

 

TECHNIQUE: Single frontal view of the chest is obtained.

 

FINDINGS:  Left-sided hydropneumothorax is present, likely trapped lung. There is no mediastinal shif
t. Pigtail catheter present at the level the posterior costophrenic sulcus. Right lung is clear. Hear
t is obscured by density of the left lung base.

 

IMPRESSION:  Status post chest tube insertion with probable trapped lung, hydropneumothorax.

## 2019-08-22 NOTE — XR
EXAMINATION TYPE: XR chest 1V portable

 

DATE OF EXAM: 8/22/2019

 

COMPARISON: Prior chest x-ray 8/21/2019

 

HISTORY: Pleural effusion, status post thoracentesis

 

TECHNIQUE: Single frontal view of the chest is obtained.

 

FINDINGS:  There is some persistent abnormal density in the left lung base. Left-sided pneumothorax i
s present. Right lung is within normal limits. The heart is obscured.

 

IMPRESSION:  Left pneumothorax status post thoracentesis may represent trapped lung.

 

A [place] critical message alert has been initiated for Vj Bolanos via the [employer] | Critic
al Results System on 8/22/2019 11:01 AM.  This message alert has been sent to Vj Bolanos via the pref
erences provided by the clinician for the receipt of Radiology Critical Findings. Message ID [id number].

## 2019-08-22 NOTE — US
EXAMINATION TYPE: US chest

 

DATE OF EXAM: 8/22/2019

 

COMPARISON: NONE

 

CLINICAL HISTORY: Markings for thoracentesis by pulmonary staff.

 

TECHNIQUE:  Targeted ultrasound of the posterior lower lateral chest

 

EXAM MEASUREMENTS:

 

 

Left Pleural Effusion pocket size:  6.4 cm

**  Left skin surface to fluid distance:  4.5 cm

 

 

****Mobile septations at 4.3cm

 

Left side marked for possible thoracentesis outside the dept.

 

Pulmonologists are able to review the images in the patient?s EMR.  

 

No significant right pleural effusion.

 

Ultrasound performed of the posterior left and right chest.

 

IMPRESSIONS: Left pleural effusion.

## 2019-08-22 NOTE — P.PN
Subjective


Progress Note Date: 08/22/19


Principal diagnosis: 





Large left pleural effusion.





This is a 79-year-old white male with history of previous CVA, hypertension, 

hyperlipidemia,previous history of GI bleeding,history of nephrolithiasis, and 

recent renal study showing 3 mm nonobstructing calculus in the left lower pole 

of the left kidney.during the study, the patient was noted to have left-sided 

pleural effusion, hence he was advised to have a CT of the chest.this was done 

today on outpatient basis, and there was a significant collapse of the left lung

with near complete filling of the left sided pleural space with massive pleural 

effusion.  The only area was 90 portion of the left apex that was noted to be 

aerated based on the CT of the chest.  Mass lesion could not be entirely ruled 

out.  Hence the patient was advised to go to the ER, chest x-ray confirmed what 

was seen on the CT of the chest again, and the patient was admitted.  I went 

down to see the patient in the ER, and I performed a left-sided thoracentesis, I

was able to drain about 2000 mL of greenish color pleural effusion, and 

follow-up chest x-ray showed residual pleural effusion which I plan to evaluate 

by ultrasound in the morning, may need another thoracentesis.  Patient will be 

admitted, and the fluid that I drained from the left pleural space was sent for 

different diagnostic studies.patient was last admitted to the hospital in 

October of 2018,back then the patient had low anterior resection for 

diverticulitis, and for ongoing iron deficiency anemia.  Back then he had a 

masslike collection within the abdomen measuring 87 cm between the sigmoid and 

the urinary bladder treated with antibiotics and outpatient setting.pulmonary-

wise, the patient denies any cough, no fever, no chills, no hemoptysis, no 

weight loss, he has some shortness of breath on exertion, and left-sided 

pleuritic chest pain.  This has been going on for the last few days.no history 

of underlying malignancy.





The patient is seen today 08/22/2019 in follow-up on the observation unit.  He 

is currently awake and alert in no acute distress.  He is maintaining good O2 

saturations in the 90s on room air.  He is status post left-sided thoracentesis 

yesterday with 2000 mL of greenish colored pleural fluid removed.  Cultures and 

Gram stain are pending.  Fluid analysis reveals less than 4 glucose, 6300 

nucleated cells, 4 poly-nuclear WBCs, 92 mononuclear WBCs.  Fluid protein 6.6.  

.  Bilirubin pending. Exudative in nature.  No fever, no chills, no night

sweats.  He is currently on IV Solu-Medrol.  9 normal saline at 20 ML's.





Objective





- Vital Signs


Vital signs: 


                                   Vital Signs











Temp  98.5 F   08/22/19 07:25


 


Pulse  95   08/22/19 07:25


 


Resp  18   08/22/19 07:25


 


BP  138/93   08/22/19 07:25


 


Pulse Ox  96   08/22/19 07:25








                                 Intake & Output











 08/21/19 08/22/19 08/22/19





 18:59 06:59 18:59


 


Intake Total  300 236


 


Balance  300 236


 


Weight 109.316 kg  


 


Intake:   


 


  Oral  300 236


 


Other:   


 


  Voiding Method Toilet Toilet Toilet


 


  # Voids  1 














- Exam





GENERAL EXAM: Alert, pleasant 79-year-old gentleman, active, comfortable in no 

apparent distress.  On room air.


HEAD: Normocephalic.


EYES: Normal reaction of pupils, equal size.


NOSE: Clear with pink turbinates.


THROAT: No erythema or exudates.


NECK: No masses, no JVD.


CHEST: No chest wall deformity.


LUNGS: Equal air entry with crackles, dullness to the left lung base.


CVS: S1 and S2 normal with no audible murmur, regular rhythm.


ABDOMEN: No hepatosplenomegaly, normal bowel sounds, no guarding or rigidity.


SPINE: No scoliosis or deformity


SKIN: No rashes


CENTRAL NERVOUS SYSTEM: No focal deficits, tone is normal in all 4 extremities.


EXTREMITIES: There is no peripheral edema.  No clubbing, no cyanosis.  

Peripheral pulses are intact.





- Labs


CBC & Chem 7: 


                                 08/21/19 12:34





                                 08/21/19 12:34


Labs: 


                  Abnormal Lab Results - Last 24 Hours (Table)











  08/21/19 08/21/19 08/22/19 Range/Units





  12:34 20:09 06:44 


 


POC Glucose (mg/dL)   198 H  139 H  (75-99)  mg/dL


 


ALT  13 L    (21-72)  U/L


 


Alkaline Phosphatase  262 H    ()  U/L


 


Creatine Kinase  54 L    ()  U/L


 


Total Protein  8.5 H    (6.3-8.2)  g/dL








                      Microbiology - Last 24 Hours (Table)











 08/21/19 16:00 Gram Stain - Preliminary





 Pleural Fluid Body Fluid Culture - Preliminary


 


 08/21/19 16:00 Anaerobic Culture - Preliminary





 Pleural Fluid 


 


 08/21/19 16:00 Fungal Culture - Preliminary





 Pleural Fluid 


 


 08/21/19 16:00 Acid Fast Bacilli Culture - Preliminary





 Pleural Fluid 














Assessment and Plan


Assessment: 





Impression:





1: Left-sided pleural effusion with near complete collapse of the left lung, 

possibly malignant , status post left thoracentesis 2 L of fluid removed, and 

the fluid was sent for different diagnostic studies.





2 previous history of CVA, presently asymptomatic.





3 history of diverticulitis and GI bleeding requiring low anterior resection 

October 18 2018.





4 history of iron deficiency anemia secondary to GI blood losses





5 history of masslike collection within the abdomen measuring 87 cm between the

sigmoid and urinary bladder treated mostly with antibiotics.





6 benign essential hypertension





7 hyperlipidemia





Recommendation:





The patient was seen and evaluated by Dr. Bolanos.  Follow-up chest x-ray 

reviewed.  Ultrasound reviewed.  Still 6.4 cm pocket on the left. Fluid analysis

reviewed.  Bilirubin pending.  Gram stain and cultures pending.  We will plan 

for repeat left-sided thoracentesis and send for pH level.  Within the 

differential remains malignancy, rheumatoid, doubt tuberculosis.  We will 

continue to follow and make further recommendations based on his clinical 

status.





I, the cosigning physician, performed a history & physical examination of the 

patient. Lungs sounds with crackles and dullness of the left lung base.  

Maintaining good O2 saturations in the 90s on room air.  I discussed the 

assessment and plan of care with my nurse practitioner, Zeinab Castillo. I attest to 

the above note as dictated by her.

## 2019-08-23 LAB
ALBUMIN SERPL-MCNC: 3.9 G/DL (ref 3.5–5)
ALP SERPL-CCNC: 206 U/L (ref 38–126)
ALT SERPL-CCNC: 13 U/L (ref 21–72)
ANION GAP SERPL CALC-SCNC: 12 MMOL/L
AST SERPL-CCNC: 20 U/L (ref 17–59)
BUN SERPL-SCNC: 31 MG/DL (ref 9–20)
CALCIUM SPEC-MCNC: 10.1 MG/DL (ref 8.4–10.2)
CELLS COUNTED: 200
CHLORIDE SERPL-SCNC: 104 MMOL/L (ref 98–107)
CO2 SERPL-SCNC: 27 MMOL/L (ref 22–30)
ERYTHROCYTE [DISTWIDTH] IN BLOOD BY AUTOMATED COUNT: 4.69 M/UL (ref 4.3–5.9)
ERYTHROCYTE [DISTWIDTH] IN BLOOD: 14.9 % (ref 11.5–15.5)
GLUCOSE BLD-MCNC: 128 MG/DL (ref 75–99)
GLUCOSE BLD-MCNC: 128 MG/DL (ref 75–99)
GLUCOSE BLD-MCNC: 160 MG/DL (ref 75–99)
GLUCOSE BLD-MCNC: 96 MG/DL (ref 75–99)
GLUCOSE SERPL-MCNC: 107 MG/DL (ref 74–99)
HCT VFR BLD AUTO: 42.3 % (ref 39–53)
HGB BLD-MCNC: 13.7 GM/DL (ref 13–17.5)
LYMPHOCYTES # BLD MANUAL: 1.26 K/UL (ref 1–4.8)
MCH RBC QN AUTO: 29.3 PG (ref 25–35)
MCHC RBC AUTO-ENTMCNC: 32.5 G/DL (ref 31–37)
MCV RBC AUTO: 90.1 FL (ref 80–100)
MONOCYTES # BLD MANUAL: 2.53 K/UL (ref 0–1)
NEUTROPHILS NFR BLD MANUAL: 89 %
PLATELET # BLD AUTO: 322 K/UL (ref 150–450)
POTASSIUM SERPL-SCNC: 5 MMOL/L (ref 3.5–5.1)
PROT SERPL-MCNC: 7.8 G/DL (ref 6.3–8.2)
SODIUM SERPL-SCNC: 143 MMOL/L (ref 137–145)
WBC # BLD AUTO: 31.6 K/UL (ref 3.8–10.6)

## 2019-08-23 RX ADMIN — METHYLPREDNISOLONE SODIUM SUCCINATE SCH: 125 INJECTION, POWDER, FOR SOLUTION INTRAMUSCULAR; INTRAVENOUS at 06:37

## 2019-08-23 RX ADMIN — PIPERACILLIN AND TAZOBACTAM SCH MLS/HR: 3; .375 INJECTION, POWDER, FOR SOLUTION INTRAVENOUS at 08:29

## 2019-08-23 RX ADMIN — PIPERACILLIN AND TAZOBACTAM SCH MLS/HR: 3; .375 INJECTION, POWDER, FOR SOLUTION INTRAVENOUS at 16:16

## 2019-08-23 RX ADMIN — METHYLPREDNISOLONE SODIUM SUCCINATE SCH MG: 125 INJECTION, POWDER, FOR SOLUTION INTRAMUSCULAR; INTRAVENOUS at 12:55

## 2019-08-23 RX ADMIN — METHYLPREDNISOLONE SODIUM SUCCINATE SCH MG: 125 INJECTION, POWDER, FOR SOLUTION INTRAMUSCULAR; INTRAVENOUS at 18:37

## 2019-08-23 RX ADMIN — INSULIN ASPART SCH: 100 INJECTION, SOLUTION INTRAVENOUS; SUBCUTANEOUS at 07:17

## 2019-08-23 RX ADMIN — METHYLPREDNISOLONE SODIUM SUCCINATE SCH MG: 125 INJECTION, POWDER, FOR SOLUTION INTRAMUSCULAR; INTRAVENOUS at 01:09

## 2019-08-23 RX ADMIN — PIPERACILLIN AND TAZOBACTAM SCH: 3; .375 INJECTION, POWDER, FOR SOLUTION INTRAVENOUS at 06:40

## 2019-08-23 RX ADMIN — INSULIN ASPART SCH UNIT: 100 INJECTION, SOLUTION INTRAVENOUS; SUBCUTANEOUS at 20:07

## 2019-08-23 RX ADMIN — CEFAZOLIN SCH: 330 INJECTION, POWDER, FOR SOLUTION INTRAMUSCULAR; INTRAVENOUS at 19:56

## 2019-08-23 RX ADMIN — INSULIN ASPART SCH: 100 INJECTION, SOLUTION INTRAVENOUS; SUBCUTANEOUS at 12:30

## 2019-08-23 RX ADMIN — METHYLPREDNISOLONE SODIUM SUCCINATE SCH: 125 INJECTION, POWDER, FOR SOLUTION INTRAMUSCULAR; INTRAVENOUS at 04:59

## 2019-08-23 RX ADMIN — METOPROLOL SUCCINATE SCH MG: 50 TABLET, EXTENDED RELEASE ORAL at 08:23

## 2019-08-23 RX ADMIN — INSULIN ASPART SCH: 100 INJECTION, SOLUTION INTRAVENOUS; SUBCUTANEOUS at 18:00

## 2019-08-23 NOTE — XR
EXAMINATION TYPE: XR chest 1V portable

 

DATE OF EXAM: 8/23/2019

 

COMPARISON: Prior chest x-ray 8/22/2019, chest CT 8/23/2019

 

HISTORY: Hydropneumothorax, trapped lung

 

TECHNIQUE: Single frontal view of the chest is obtained.

 

FINDINGS:  There is been interval removal of the fluid within the left hemithorax. Pneumothorax persi
sts, basilar density on the left may represent an underlying mass, findings suspicious for trapped haven
ng. Heart size is stable. Patient is rotated. No sizable effusion.

 

IMPRESSION:  Findings likely represent trapped lung, possible left lower lobe lung mass.

## 2019-08-23 NOTE — P.PN
Subjective


Progress Note Date: 08/23/19


Principal diagnosis: 





Left-sided pleural effusion with near complete collapse of the left lung, 

possibly malignant, status post left thoracentesis with 2 L of fluid removed by 

pulmonary medicine, past medical history significant for hypertension, 

hyperlipidemia, previous CVA, history of GI bleed, history of diverticulitis 

status post low anterior resection, recent nephrolithiasis with a recent renal 

study showing a 3 mm nonobstructing calculus in the left lower pole of the left 

kidney





The patient is sitting up to the bedside chair.  He is in no acute distress.  He

remains hemodynamically stable.  The patient underwent a placement of left 

pleural pigtail catheter per interventional radiology yesterday.  The pigtail 

catheter remains to low continuous wall suction -20 cm H2O.  Draining thin 

serosanguineous drainage.  Intermittent air leak is present.  Cytology results 

from the pleural fluid showed mixed inflammatory cell population with abundant 

reactive mesothelial cells and occasional pigmented histiocytes.  It was 

negative for cells cytologically diagnostic of a neoplasm.  Oxygen saturations 

are 95% on room air. 





Objective





- Vital Signs


Vital signs: 


                                   Vital Signs











Temp  97.8 F   08/23/19 14:26


 


Pulse  65   08/23/19 14:26


 


Resp  16   08/23/19 14:26


 


BP  142/85   08/23/19 14:26


 


Pulse Ox  95   08/23/19 14:26








                                 Intake & Output











 08/22/19 08/23/19 08/23/19





 18:59 06:59 18:59


 


Intake Total 694  450


 


Output Total 1450 440 300


 


Balance -756 -440 150


 


Weight   109.316 kg


 


Intake:   


 


  Oral 494  450


 


  Other 200  


 


Output:   


 


  Chest Tube Drainage 1450 140 100


 


    left posterior chest 1450 140 100


 


  Urine  300 200


 


Other:   


 


  Voiding Method Toilet Toilet Toilet





  Urinal Urinal














- Constitutional


General appearance: Present: cooperative, no acute distress, obese





- Respiratory


Details: 





Lungs sounds essentially clear throughout, diminished to his left lower lobe.  

Respirations are symmetrical and nonlabored.  Left posterior pigtail chest tube 

catheter in place to low continuous wall suction -20 cm H2O.  Intermittent air 

leak is present.  Draining thin serosanguineous drainage with 1.6 L of fluid 

drained in the last 24 hours.





- Cardiovascular


Details: 





Regular rhythm and rate.  S1 and S2 present, negative for S3, gallop or murmur.





- Gastrointestinal


Gastrointestinal Comment(s): 





Abdomen is soft, nontender and nondistended.  Active bowel sounds present in all

4 quadrants.  No guarding or rigidity.  No organomegaly appreciated.





- Genitourinary


Genitourinary Comment(s): 





Voiding clear yellow urine.





- Integumentary


Integumentary Comment(s): 





Skin is warm and dry.  No clubbing or cyanosis is present.  No rash or abnormal 

pigmentation is present.





- Neurologic


Neurologic: Present: CNII-XII intact





- Musculoskeletal


Musculoskeletal: Present: gait normal, strength equal bilaterally





- Psychiatric


Psychiatric: Present: A&O x's 3, appropriate affect, intact judgment & insight





- Allied health notes


Allied health notes reviewed: nursing





- Labs


CBC & Chem 7: 


                                 08/23/19 07:52





                                 08/23/19 07:52


Labs: 


                  Abnormal Lab Results - Last 24 Hours (Table)











  08/22/19 08/22/19 08/22/19 Range/Units





  16:48 20:31 21:50 


 


WBC     (3.8-10.6)  k/uL


 


Neutrophils # (Manual)     (1.3-7.7)  k/uL


 


Monocytes # (Manual)     (0-1.0)  k/uL


 


BUN     (9-20)  mg/dL


 


Glucose     (74-99)  mg/dL


 


POC Glucose (mg/dL)  123 H  149 H  141 H  (75-99)  mg/dL


 


ALT     (21-72)  U/L


 


Alkaline Phosphatase     ()  U/L














  08/23/19 08/23/19 08/23/19 Range/Units





  06:46 07:52 07:52 


 


WBC   31.6 H   (3.8-10.6)  k/uL


 


Neutrophils # (Manual)   28.12 H   (1.3-7.7)  k/uL


 


Monocytes # (Manual)   2.53 H   (0-1.0)  k/uL


 


BUN    31 H  (9-20)  mg/dL


 


Glucose    107 H  (74-99)  mg/dL


 


POC Glucose (mg/dL)  128 H    (75-99)  mg/dL


 


ALT    13 L  (21-72)  U/L


 


Alkaline Phosphatase    206 H  ()  U/L








                      Microbiology - Last 24 Hours (Table)











 08/21/19 16:00 Gram Stain - Preliminary





 Pleural Fluid Body Fluid Culture - Preliminary


 


 08/21/19 16:00 Acid Fast Bacilli Smear - Final





 Pleural Fluid Acid Fast Bacilli Culture - Preliminary














- Imaging and Cardiology


Chest x-ray: report reviewed, image reviewed





Assessment and Plan


Assessment: 





1.  Left-sided pleural effusion with near complete collapse of the left lung, 

possibly malignant , status post left thoracentesis 2 L of fluid removed, status

post left chest pigtail catheter placement per interventional radiology


2.  Left-sided pneumothorax, status post thoracentesis, possible trapped lung


3.  History of CVA, no residual deficits


4.  History of diverticulitis and GI bleeding requiring low anterior resection 

October 18 2018.


5.  History of iron deficiency anemia secondary to GI blood losses


6.  History of masslike collection within the abdomen measuring 87 cm between 

the sigmoid and urinary bladder treated mostly with antibiotics.


7.  History of hypertension


8.  Hyperlipidemia


Plan: 





1.  Continue left pigtail catheter to low continuous wall suction -20 cm H2O.


2.  Computed tomography scan results pending.


3.  Pleural fluid cytology results showed mixed inflammatory cell population 

with abundant reactive mesothelial cells and occasional pigmented histiocytes.  

Negative for cells cytologically diagnostic for of neoplasm.


4.  Pulmonary management per Dr. Bolanos.


5.  Encourage use of his incentive spirometry every hour while awake.


6.  More recommendations to follow based on patient's clinical course.


Time with Patient: Greater than 30

## 2019-08-23 NOTE — CT
EXAMINATION TYPE: CT chest w con

 

DATE OF EXAM: 8/23/2019

 

COMPARISON: August 21, 2019

 

HISTORY: possible lung mass

 

CT DLP: 546.6 mGycm

Automated exposure control for dose reduction was used.

 

CONTRAST: 

CT scan of the chest is performed with IV Contrast, patient injected with 100 mL of Isovue 300.

 

FINDINGS:

 

LUNGS: The patient is status post thoracentesis. There is a 50% pneumothorax noted. There is residual
 atelectatic lung about the left hilar region with rounded masslike area 3.2 x 3.1 cm. This could how
ever reflect an area of rounded residual atelectasis. Continued follow-up is advised. Also consider P
ET/CT. The right lung is clear. Scattered emphysematous changes noted.

 

MEDIASTINUM: There are no greater than 1 cm hilar or mediastinal lymph nodes.   No pericardial effusi
on is seen.  Thoracic aorta is of normal caliber. The heart is not enlarged.

 

UPPER ABDOMEN:  No significant abnormality appreciated.

 

OTHER:  Sclerotic changes of the approximate T9 and T10 segments.

 

IMPRESSION:

1. 50% left-sided pneumothorax in a patient who is status post thoracentesis.

2. Masslike area mid left lung versus atelectasis. Trapped lung difficult to exclude.

3. Sclerotic appearance to the T9 and T10 segments. Metastatic disease not excluded.

 

A [place] critical message alert has been initiated for Vj Bolanos via the [employer] | Critic
al Results System on 8/23/2019 10:59 AM.  This message alert has been sent to Vj Bolanos via the pref
erences provided by the clinician for the receipt of Radiology Critical Findings. Message ID [id number].
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
## 2019-08-23 NOTE — P.PN
Subjective


Progress Note Date: 08/23/19





Nilda More is a 78 yo M with PMH significant for CVA, HTN, HLD who presented 

to the ED at the request of his PCP after a large L pleural effusion was noted 

on an outpatient imaging study. He went for a CT to evaluate suspected 

nephrolithiasis, CT did show 3 mm non-obstructing stone but incidentally noted 

pleural effusion. This was then evaluated more fully with a chest CT which 

showed almost complete collapse of the L lung with only the apex aerated. Pt 

denies chest pain, shortness of breath, pain with inspiration. He does feel that

he had been getting dyspneic with exertion more recently and had noticed a 

cough. No fever, chills, night sweats or recent weight loss. He is a former 

smoker. In the ED, CXR again confirmed large L effusion and pulmonology was 

consulted and performed thoracentesis with 2000 ml greenish fluid removed.





8/23. Pt had a pleurex catheter placed to suction with another liter of fluid 

removed. A follow up CT scan was performed which shows new L pneumothorax and 

masslike consolidation at mid lung. CT surgery consulted to further eval. 

Pleural fluid cytology studies unremarkable. Pt denies chest pain, shortness of 

breath, fever, chills.





Objective





- Vital Signs


Vital signs: 


                                   Vital Signs











Temp  97.7 F   08/23/19 07:06


 


Pulse  75   08/23/19 07:06


 


Resp  16   08/23/19 07:06


 


BP  160/95   08/23/19 07:06


 


Pulse Ox  95   08/23/19 07:06








                                 Intake & Output











 08/22/19 08/23/19 08/23/19





 18:59 06:59 18:59


 


Intake Total 694  450


 


Output Total 1450 440 300


 


Balance -756 -440 150


 


Weight   109.316 kg


 


Intake:   


 


  Oral 494  450


 


  Other 200  


 


Output:   


 


  Chest Tube Drainage 1450 140 100


 


    left posterior chest 1450 140 100


 


  Urine  300 200


 


Other:   


 


  Voiding Method Toilet Toilet Toilet





  Urinal Urinal














- Exam





Gen: well developed, well nourished, NAD. Vitals reviewed


CV: RRR, no murmur, pulses 2+


Lungs: improved aeration, diminished breath sounds at L base, normal effort


Ext: no edema


Skin: warm and dry





- Labs


CBC & Chem 7: 


                                 08/23/19 07:52





                                 08/23/19 07:52


Labs: 


                  Abnormal Lab Results - Last 24 Hours (Table)











  08/22/19 08/22/19 08/22/19 Range/Units





  16:48 20:31 21:50 


 


WBC     (3.8-10.6)  k/uL


 


Neutrophils # (Manual)     (1.3-7.7)  k/uL


 


Monocytes # (Manual)     (0-1.0)  k/uL


 


BUN     (9-20)  mg/dL


 


Glucose     (74-99)  mg/dL


 


POC Glucose (mg/dL)  123 H  149 H  141 H  (75-99)  mg/dL


 


ALT     (21-72)  U/L


 


Alkaline Phosphatase     ()  U/L














  08/23/19 08/23/19 08/23/19 Range/Units





  06:46 07:52 07:52 


 


WBC   31.6 H   (3.8-10.6)  k/uL


 


Neutrophils # (Manual)   28.12 H   (1.3-7.7)  k/uL


 


Monocytes # (Manual)   2.53 H   (0-1.0)  k/uL


 


BUN    31 H  (9-20)  mg/dL


 


Glucose    107 H  (74-99)  mg/dL


 


POC Glucose (mg/dL)  128 H    (75-99)  mg/dL


 


ALT    13 L  (21-72)  U/L


 


Alkaline Phosphatase    206 H  ()  U/L








                      Microbiology - Last 24 Hours (Table)











 08/21/19 16:00 Gram Stain - Preliminary





 Pleural Fluid Body Fluid Culture - Preliminary


 


 08/21/19 16:00 Acid Fast Bacilli Smear - Final





 Pleural Fluid Acid Fast Bacilli Culture - Preliminary














Assessment and Plan


(1) Empyema of left pleural space


Current Visit: Yes   Status: Acute   Code(s): J86.9 - PYOTHORAX WITHOUT FISTULA 

 SNOMED Code(s): 49908206


   





(2) Pleural effusion on left


Current Visit: Yes   Status: Acute   Code(s): J90 - PLEURAL EFFUSION, NOT 

ELSEWHERE CLASSIFIED   SNOMED Code(s): 81821953


   





(3) HTN (hypertension)


Current Visit: Yes   Status: Acute   Code(s): I10 - ESSENTIAL (PRIMARY) HYPER

TENSION   SNOMED Code(s): 75768734


   





(4) Type 2 diabetes mellitus


Current Visit: Yes   Status: Acute   Code(s): E11.9 - TYPE 2 DIABETES MELLITUS 

WITHOUT COMPLICATIONS   SNOMED Code(s): 13129115


   


Plan: 





1. L pleural effusion. Possible empyema. Pulmonology and CT surgery following 

and fluid studies ordered. Continue antibiotics


2. T2DM. Accucheck, sliding scale


3. HTN, continue toprol

## 2019-08-23 NOTE — P.PN
Subjective


Progress Note Date: 08/23/19


Principal diagnosis: 


Large left pleural effusion





This is a 79-year-old white male with history of previous CVA, hypertension, 

hyperlipidemia,previous history of GI bleeding,history of nephrolithiasis, and 

recent renal study showing 3 mm nonobstructing calculus in the left lower pole 

of the left kidney.during the study, the patient was noted to have left-sided 

pleural effusion, hence he was advised to have a CT of the chest.this was done 

today on outpatient basis, and there was a significant collapse of the left lung

with near complete filling of the left sided pleural space with massive pleural 

effusion.  The only area was 90 portion of the left apex that was noted to be 

aerated based on the CT of the chest.  Mass lesion could not be entirely ruled 

out.  Hence the patient was advised to go to the ER, chest x-ray confirmed what 

was seen on the CT of the chest again, and the patient was admitted.  I went 

down to see the patient in the ER, and I performed a left-sided thoracentesis, I

was able to drain about 2000 mL of greenish color pleural effusion, and follow-

up chest x-ray showed residual pleural effusion which I plan to evaluate by 

ultrasound in the morning, may need another thoracentesis.  Patient will be 

admitted, and the fluid that I drained from the left pleural space was sent for 

different diagnostic studies.patient was last admitted to the hospital in 

October of 2018,back then the patient had low anterior resection for 

diverticulitis, and for ongoing iron deficiency anemia.  Back then he had a 

masslike collection within the abdomen measuring 87 cm between the sigmoid and 

the urinary bladder treated with antibiotics and outpatient setting.pulmonary-

wise, the patient denies any cough, no fever, no chills, no hemoptysis, no 

weight loss, he has some shortness of breath on exertion, and left-sided 

pleuritic chest pain.  This has been going on for the last few days.no history 

of underlying malignancy.





The patient is seen today 08/22/2019 in follow-up on the observation unit.  He 

is currently awake and alert in no acute distress.  He is maintaining good O2 

saturations in the 90s on room air.  He is status post left-sided thoracentesis 

yesterday with 2000 mL of greenish colored pleural fluid removed.  Cultures and 

Gram stain are pending.  Fluid analysis reveals less than 4 glucose, 6300 

nucleated cells, 4 poly-nuclear WBCs, 92 mononuclear WBCs.  Fluid protein 6.6.  

.  Bilirubin pending. Exudative in nature.  No fever, no chills, no night

sweats.  He is currently on IV Solu-Medrol.  9 normal saline at 20 ML's.





On 08/23/2019 patient is seen in follow-up on the surgical floor.  Yesterday 

left-sided pigtail chest tube was inserted by interventional radiology, and was 

connected to Pleur-evac which was placed to suction, and patient has since put 

out additional 1650 ML of dark colored pleural fluid.  Cytology still pending, 

cultures remain negative thus far.  Patient denies any chest pain, denies any 

dyspnea, he states his breathing has much improved, he is currently remains on 

room air, with a pulse ox of 95%, hemodynamically stable, lung sounds reveal an 

air entry on the left, with diminished breath sounds at the left base.  We'll 

obtain incentive spirometer, patient will be encouraged to deep breathing cough,

remains on empiric antibiotics in the form of Zosyn, and IV steroids, today's 

labs have been reviewed showing leukocytosis, of 31.6, possibly steroid induced,

electrolytes are within normal limits, BUN 31 and creatinine is 0.96.











Objective





- Vital Signs


Vital signs: 


                                   Vital Signs











Temp  97.7 F   08/23/19 07:06


 


Pulse  75   08/23/19 07:06


 


Resp  16   08/23/19 07:06


 


BP  160/95   08/23/19 07:06


 


Pulse Ox  95   08/23/19 07:06








                                 Intake & Output











 08/22/19 08/23/19 08/23/19





 18:59 06:59 18:59


 


Intake Total 694  250


 


Output Total 1450 440 40


 


Balance -756 -440 210


 


Intake:   


 


  Oral 494  250


 


  Other 200  


 


Output:   


 


  Chest Tube Drainage 1450 140 40


 


    left posterior chest 1450 140 40


 


  Urine  300 


 


Other:   


 


  Voiding Method Toilet Toilet Toilet





  Urinal Urinal














- Exam


 GENERAL EXAM: Alert, pleasant, 79-year-old white male, on room air, in no acute

distress, comfortable in no apparent distress.


HEAD: Normocephalic/atraumatic.


EYES: Normal reaction of pupils, equal size.  Conjunctiva pink, sclera white.


NOSE: Clear with pink turbinates.


THROAT: No erythema or exudates.


NECK: No masses, no JVD, no thyroid enlargement, no adenopathy.


CHEST: No chest wall deformity.  Symmetrical expansion.  Left posterior pigtail 

chest tube catheter connected to a Pleur-evac with 1650 ML of dark pleural 

fluid, Pleur-evac to wall suction, no evidence of air leak


LUNGS: Equal air entry with no crackles, wheeze, rhonchi or dullness.


CVS: Regular rate and rhythm, normal S1 and S2, no gallops, no murmurs, no rubs


ABDOMEN: Soft, nontender.  No hepatosplenomegaly, normal bowel sounds, no 

guarding or rigidity.


EXTREMITIES: No clubbing, no edema, no cyanosis, 2+ pulses and upper and lower 

extremities.


MUSCULOSKELETAL: Muscle strength and tone normal.


SPINE: No scoliosis or deformity


SKIN: No rashes


CENTRAL NERVOUS SYSTEM: Alert and oriented -3.  No focal deficits, tone is 

normal in all 4 extremities.


PSYCHIATRIC: Alert and oriented -3.  Appropriate affect.  Intact judgment and 

insight.











- Labs


CBC & Chem 7: 


                                 08/23/19 07:52





                                 08/23/19 07:52


Labs: 


                  Abnormal Lab Results - Last 24 Hours (Table)











  08/22/19 08/22/19 08/22/19 Range/Units





  11:11 11:54 16:48 


 


WBC     (3.8-10.6)  k/uL


 


Neutrophils # (Manual)     (1.3-7.7)  k/uL


 


Monocytes # (Manual)     (0-1.0)  k/uL


 


ESR  41 H    (0-15)  mm/hr


 


BUN     (9-20)  mg/dL


 


Glucose     (74-99)  mg/dL


 


POC Glucose (mg/dL)   120 H  123 H  (75-99)  mg/dL


 


ALT     (21-72)  U/L


 


Alkaline Phosphatase     ()  U/L














  08/22/19 08/22/19 08/23/19 Range/Units





  20:31 21:50 06:46 


 


WBC     (3.8-10.6)  k/uL


 


Neutrophils # (Manual)     (1.3-7.7)  k/uL


 


Monocytes # (Manual)     (0-1.0)  k/uL


 


ESR     (0-15)  mm/hr


 


BUN     (9-20)  mg/dL


 


Glucose     (74-99)  mg/dL


 


POC Glucose (mg/dL)  149 H  141 H  128 H  (75-99)  mg/dL


 


ALT     (21-72)  U/L


 


Alkaline Phosphatase     ()  U/L














  08/23/19 08/23/19 Range/Units





  07:52 07:52 


 


WBC  31.6 H   (3.8-10.6)  k/uL


 


Neutrophils # (Manual)  28.12 H   (1.3-7.7)  k/uL


 


Monocytes # (Manual)  2.53 H   (0-1.0)  k/uL


 


ESR    (0-15)  mm/hr


 


BUN   31 H  (9-20)  mg/dL


 


Glucose   107 H  (74-99)  mg/dL


 


POC Glucose (mg/dL)    (75-99)  mg/dL


 


ALT   13 L  (21-72)  U/L


 


Alkaline Phosphatase   206 H  ()  U/L








                      Microbiology - Last 24 Hours (Table)











 08/21/19 16:00 Gram Stain - Preliminary





 Pleural Fluid Body Fluid Culture - Preliminary


 


 08/21/19 16:00 Acid Fast Bacilli Smear - Final





 Pleural Fluid Acid Fast Bacilli Culture - Preliminary














Assessment and Plan


Plan: 


 Assessment:





1 Left-sided pleural effusion with near complete collapse of the left lung, 

possibly malignant , status post left thoracentesis 2 L of fluid removed, and 

the fluid was sent for different diagnostic studies.  Patient is status post 

thoracentesis would removal of 2 L of pleural fluid, with additional fluid 

remaining in the left pleural space, status post left chest pigtail chest tube 

with additional 1600 mL of pleural fluid drainage





2 left-sided pneumothorax, status post thoracentesis, trapped lung





3 previous history of CVA, presently asymptomatic.





4 history of diverticulitis and GI bleeding requiring low anterior resection 

October 18 2018.





5 history of iron deficiency anemia secondary to GI blood losses





6 history of masslike collection within the abdomen measuring 87 cm between the

sigmoid and urinary bladder treated mostly with antibiotics.





7 benign essential hypertension





8 hyperlipidemia





Plan:





Surgery has been consulted, CT chest of the chest has been reviewed, showing 50%

left-sided pneumothorax, masslike area in the mid left lung, possibly trapped 

lung, cytology still pending, cultures remain negative thus far.  Clinically 

patient remains stable, denies any dyspnea, he is on room air, no chest pain.  

No fever or chills, continue with empiric antibiotics, continue with IV 

steroids.  Her vital incentive spirometer, encouraged to deep breathe and cough,

encourage ambulation.  We'll continue to follow





I performed a history & physical examination of the patient and discussed their 

management with my nurse practitioner, Lidia Lassiter.  I reviewed the nurse 

practitioner's note and agree with the documented findings and plan of care.  

Lung sounds are positive for diminished breath sounds.  The findings and the 

impression was discussed with the patient.  I attest to the documentation by the

nurse practitioner. 





Time with Patient: Less than 30

## 2019-08-24 LAB
BASOPHILS # BLD AUTO: 0 K/UL (ref 0–0.2)
BASOPHILS NFR BLD AUTO: 0 %
EOSINOPHIL # BLD AUTO: 0.2 K/UL (ref 0–0.7)
EOSINOPHIL NFR BLD AUTO: 1 %
ERYTHROCYTE [DISTWIDTH] IN BLOOD BY AUTOMATED COUNT: 5.13 M/UL (ref 4.3–5.9)
ERYTHROCYTE [DISTWIDTH] IN BLOOD: 14.4 % (ref 11.5–15.5)
GLUCOSE BLD-MCNC: 110 MG/DL (ref 75–99)
GLUCOSE BLD-MCNC: 160 MG/DL (ref 75–99)
GLUCOSE BLD-MCNC: 176 MG/DL (ref 75–99)
GLUCOSE BLD-MCNC: 94 MG/DL (ref 75–99)
HCT VFR BLD AUTO: 46.9 % (ref 39–53)
HGB BLD-MCNC: 15 GM/DL (ref 13–17.5)
LYMPHOCYTES # SPEC AUTO: 0.7 K/UL (ref 1–4.8)
LYMPHOCYTES NFR SPEC AUTO: 3 %
MCH RBC QN AUTO: 29.3 PG (ref 25–35)
MCHC RBC AUTO-ENTMCNC: 32 G/DL (ref 31–37)
MCV RBC AUTO: 91.5 FL (ref 80–100)
MONOCYTES # BLD AUTO: 0.9 K/UL (ref 0–1)
MONOCYTES NFR BLD AUTO: 4 %
NEUTROPHILS # BLD AUTO: 19.3 K/UL (ref 1.3–7.7)
NEUTROPHILS NFR BLD AUTO: 91 %
PLATELET # BLD AUTO: 320 K/UL (ref 150–450)
WBC # BLD AUTO: 21.2 K/UL (ref 3.8–10.6)

## 2019-08-24 RX ADMIN — METHYLPREDNISOLONE SODIUM SUCCINATE SCH MG: 125 INJECTION, POWDER, FOR SOLUTION INTRAMUSCULAR; INTRAVENOUS at 05:57

## 2019-08-24 RX ADMIN — METHYLPREDNISOLONE SODIUM SUCCINATE SCH MG: 125 INJECTION, POWDER, FOR SOLUTION INTRAMUSCULAR; INTRAVENOUS at 17:23

## 2019-08-24 RX ADMIN — METHYLPREDNISOLONE SODIUM SUCCINATE SCH MG: 125 INJECTION, POWDER, FOR SOLUTION INTRAMUSCULAR; INTRAVENOUS at 23:14

## 2019-08-24 RX ADMIN — INSULIN ASPART SCH UNIT: 100 INJECTION, SOLUTION INTRAVENOUS; SUBCUTANEOUS at 12:33

## 2019-08-24 RX ADMIN — INSULIN ASPART SCH: 100 INJECTION, SOLUTION INTRAVENOUS; SUBCUTANEOUS at 17:03

## 2019-08-24 RX ADMIN — CEFAZOLIN SCH: 330 INJECTION, POWDER, FOR SOLUTION INTRAMUSCULAR; INTRAVENOUS at 14:03

## 2019-08-24 RX ADMIN — INSULIN ASPART SCH: 100 INJECTION, SOLUTION INTRAVENOUS; SUBCUTANEOUS at 07:09

## 2019-08-24 RX ADMIN — METHYLPREDNISOLONE SODIUM SUCCINATE SCH MG: 125 INJECTION, POWDER, FOR SOLUTION INTRAMUSCULAR; INTRAVENOUS at 12:33

## 2019-08-24 RX ADMIN — INSULIN ASPART SCH UNIT: 100 INJECTION, SOLUTION INTRAVENOUS; SUBCUTANEOUS at 21:11

## 2019-08-24 RX ADMIN — METHYLPREDNISOLONE SODIUM SUCCINATE SCH MG: 125 INJECTION, POWDER, FOR SOLUTION INTRAMUSCULAR; INTRAVENOUS at 01:09

## 2019-08-24 RX ADMIN — PIPERACILLIN AND TAZOBACTAM SCH MLS/HR: 3; .375 INJECTION, POWDER, FOR SOLUTION INTRAVENOUS at 23:14

## 2019-08-24 RX ADMIN — METOPROLOL SUCCINATE SCH MG: 50 TABLET, EXTENDED RELEASE ORAL at 08:37

## 2019-08-24 RX ADMIN — ACETAMINOPHEN PRN MG: 325 TABLET, FILM COATED ORAL at 08:37

## 2019-08-24 RX ADMIN — PIPERACILLIN AND TAZOBACTAM SCH MLS/HR: 3; .375 INJECTION, POWDER, FOR SOLUTION INTRAVENOUS at 08:38

## 2019-08-24 RX ADMIN — SODIUM CHLORIDE SCH MLS/HR: 9 INJECTION, SOLUTION INTRAVENOUS at 05:56

## 2019-08-24 RX ADMIN — PIPERACILLIN AND TAZOBACTAM SCH MLS/HR: 3; .375 INJECTION, POWDER, FOR SOLUTION INTRAVENOUS at 15:40

## 2019-08-24 RX ADMIN — SODIUM CHLORIDE SCH MLS/HR: 9 INJECTION, SOLUTION INTRAVENOUS at 23:14

## 2019-08-24 RX ADMIN — PIPERACILLIN AND TAZOBACTAM SCH MLS/HR: 3; .375 INJECTION, POWDER, FOR SOLUTION INTRAVENOUS at 01:08

## 2019-08-24 NOTE — XR
EXAMINATION TYPE: XR chest 1V portable

 

DATE OF EXAM: 8/24/2019

 

COMPARISON: 8/23/2019, CT chest 8/23/2019

 

INDICATION: Pneumothorax, follow-up

 

TECHNIQUE: Single frontal view of the chest is obtained.

 

FINDINGS:  

The heart size is mildly prominent.  

The pulmonary vasculature is normal.  

There is some increased density along the pleural margin of the pneumothorax at the left base.  

There is a large loculated pneumothorax at the left lung base and at the left apex. This is stable fr
om the prior chest film. This is present on the chest CT.

 

IMPRESSION:  

1. Stable large left pneumothorax.

## 2019-08-24 NOTE — P.PN
Subjective


Progress Note Date: 08/24/19


Principal diagnosis: 





Large left pleural effusion.





This is a 79-year-old white male with history of previous CVA, hypertension, 

hyperlipidemia,previous history of GI bleeding,history of nephrolithiasis, and 

recent renal study showing 3 mm nonobstructing calculus in the left lower pole 

of the left kidney.during the study, the patient was noted to have left-sided 

pleural effusion, hence he was advised to have a CT of the chest. This was done 

today on outpatient basis, and there was a significant collapse of the left lung

with near complete filling of the left sided pleural space with massive pleural 

effusion.  The only area was 90 portion of the left apex that was noted to be 

aerated based on the CT of the chest.  Mass lesion could not be entirely ruled 

out.  Hence the patient was advised to go to the ER, chest x-ray confirmed what 

was seen on the CT of the chest again, and the patient was admitted.  I went 

down to see the patient in the ER, and I performed a left-sided thoracentesis, I

was able to drain about 2000 mL of greenish color pleural effusion, and follow-u

p chest x-ray showed residual pleural effusion which I plan to evaluate by 

ultrasound in the morning, may need another thoracentesis.  Patient will be 

admitted, and the fluid that I drained from the left pleural space was sent for 

different diagnostic studies.patient was last admitted to the hospital in 

October of 2018,back then the patient had low anterior resection for 

diverticulitis, and for ongoing iron deficiency anemia.  Back then he had a 

masslike collection within the abdomen measuring 87 cm between the sigmoid and 

the urinary bladder treated with antibiotics and outpatient setting.pulmonary-

wise, the patient denies any cough, no fever, no chills, no hemoptysis, no 

weight loss, he has some shortness of breath on exertion, and left-sided 

pleuritic chest pain.  This has been going on for the last few days.no history 

of underlying malignancy.





The patient is seen today 08/22/2019 in follow-up on the observation unit.  He 

is currently awake and alert in no acute distress.  He is maintaining good O2 

saturations in the 90s on room air.  He is status post left-sided thoracentesis 

yesterday with 2000 mL of greenish colored pleural fluid removed.  Cultures and 

Gram stain are pending.  Fluid analysis reveals less than 4 glucose, 6300 

nucleated cells, 4 poly-nuclear WBCs, 92 mononuclear WBCs.  Fluid protein 6.6.  

.  Bilirubin pending. Exudative in nature.  No fever, no chills, no night

sweats.  He is currently on IV Solu-Medrol.  9 normal saline at 20 ML's.





The patient is seen today 08/24/2019 in follow-up on the regular medical floor. 

He is currently sitting up in a chair at the bedside.  He is awake and alert in 

no acute distress. Maintaining good O2 saturations in the upper 90s on room air.

 He is afebrile. Chest tube remains in place. Chest x-ray reveals a stable large

left pneumothorax. Pleural fluid is negative for malignancy. Cultures pending. 

White count 21.2.  Hemoglobin 15.0. He remains on IV Solu-Medrol, vancomycin and

Zosyn.





Objective





- Vital Signs


Vital signs: 


                                   Vital Signs











Temp  98.2 F   08/24/19 07:00


 


Pulse  70   08/24/19 07:00


 


Resp  17   08/24/19 07:00


 


BP  165/92   08/24/19 07:00


 


Pulse Ox  97   08/24/19 07:00








                                 Intake & Output











 08/23/19 08/24/19 08/24/19





 18:59 06:59 18:59


 


Intake Total 450 480 


 


Output Total 360 1050 


 


Balance 90 -570 


 


Weight 109.316 kg  


 


Intake:   


 


  Oral 450 480 


 


Output:   


 


  Chest Tube Drainage 160 50 


 


    left posterior chest 160 50 


 


  Urine 200 1000 


 


Other:   


 


  Voiding Method Toilet Toilet 





 Urinal Urinal 


 


  # Voids  2 














- Exam





GENERAL EXAM: Alert, pleasant 79-year-old gentleman, active, comfortable in no 

apparent distress.  On room air.


HEAD: Normocephalic.


EYES: Normal reaction of pupils, equal size.


NOSE: Clear with pink turbinates.


THROAT: No erythema or exudates.


NECK: No masses, no JVD.


CHEST: No chest wall deformity.


LUNGS: Equal air entry with crackles, dullness to the left lung base. Chest tube

in place.


CVS: S1 and S2 normal with no audible murmur, regular rhythm.


ABDOMEN: No hepatosplenomegaly, normal bowel sounds, no guarding or rigidity.


SPINE: No scoliosis or deformity


SKIN: No rashes


CENTRAL NERVOUS SYSTEM: No focal deficits, tone is normal in all 4 extremities.


EXTREMITIES: There is no peripheral edema.  No clubbing, no cyanosis.  

Peripheral pulses are intact.





- Labs


CBC & Chem 7: 


                                 08/24/19 12:25





                                 08/24/19 07:27


Labs: 


                  Abnormal Lab Results - Last 24 Hours (Table)











  08/23/19 08/23/19 08/24/19 Range/Units





  16:39 20:03 07:05 


 


WBC     (3.8-10.6)  k/uL


 


Neutrophils #     (1.3-7.7)  k/uL


 


Lymphocytes #     (1.0-4.8)  k/uL


 


POC Glucose (mg/dL)  128 H  160 H  110 H  (75-99)  mg/dL














  08/24/19 08/24/19 Range/Units





  11:46 12:25 


 


WBC   21.2 H  (3.8-10.6)  k/uL


 


Neutrophils #   19.3 H  (1.3-7.7)  k/uL


 


Lymphocytes #   0.7 L  (1.0-4.8)  k/uL


 


POC Glucose (mg/dL)  160 H   (75-99)  mg/dL








                      Microbiology - Last 24 Hours (Table)











 08/21/19 16:00 Anaerobic Culture - Preliminary





 Pleural Fluid 


 


 08/21/19 16:00 Gram Stain - Preliminary





 Pleural Fluid Body Fluid Culture - Preliminary














Assessment and Plan


Assessment: 





Impression:





1: Left-sided pleural effusion with near complete collapse of the left lung, 

possibly malignant , status post left thoracentesis 2 L of fluid removed, and 

the fluid is negative for malignancy, cultures pending.  Status post left-sided 

chest tube placement.





2 previous history of CVA, presently asymptomatic.





3 history of diverticulitis and GI bleeding requiring low anterior resection 

October 18 2018.





4 history of iron deficiency anemia secondary to GI blood losses





5 history of masslike collection within the abdomen measuring 87 cm between the

sigmoid and urinary bladder treated mostly with antibiotics.





6 benign essential hypertension





7 hyperlipidemia





Recommendation:





The patient was seen and evaluated by Dr. Bolanos. Chest x-ray and labs reviewed.

Pleural fluid negative for malignancy. Cultures pending. chest tube remains in 

place.  The plan is for bronchoscopy with BAL on Monday.  Will most likely need 

a VATS procedure. We will continue to follow and make further recommendations 

based on his clinical status.





I, the cosigning physician, performed a history & physical examination of the 

patient. Lungs sounds with crackles and dullness of the left lung base.  

Maintaining good O2 saturations in the 90s on room air.  I discussed the 

assessment and plan of care with my nurse practitioner, Zeinab Castillo. I attest to 

the above note as dictated by her.

## 2019-08-24 NOTE — P.PN
Subjective


Progress Note Date: 08/24/19


Principal diagnosis: 


Large left-sided Pleural effusion with lung collapse; status post thoracentesis 

and Pleurx catheter placement with postoperative pneumothorax


Masslike consolidation in left midlung


Patient is seen and evaluated in the room at bedside with multiple family membe

rs including 2 sons and wife; detailed discussion about further plan of care and

prognosis was discussed and all questions by family were answered to 

satisfaction; patient is sitting up in bedside chair and denies any complaint of

chest pain or shortness of breath








Objective





- Vital Signs


Vital signs: 


                                   Vital Signs











Temp  98.2 F   08/24/19 07:00


 


Pulse  70   08/24/19 07:00


 


Resp  17   08/24/19 07:00


 


BP  165/92   08/24/19 07:00


 


Pulse Ox  97   08/24/19 07:00








                                 Intake & Output











 08/23/19 08/24/19 08/24/19





 18:59 06:59 18:59


 


Intake Total 450 480 


 


Output Total 360 1050 


 


Balance 90 -570 


 


Weight 109.316 kg  


 


Intake:   


 


  Oral 450 480 


 


Output:   


 


  Chest Tube Drainage 160 50 


 


    left posterior chest 160 50 


 


  Urine 200 1000 


 


Other:   


 


  Voiding Method Toilet Toilet 





 Urinal Urinal 


 


  # Voids  2 














- Exam








PHYSICAL EXAMINATION: 





GENERAL: The patient is alert and oriented x3, not in any acute distress. Well 

developed, well nourished. 


HEENT: Pupils are round and equally reacting to light. EOMI. No scleral icterus.

No conjunctival pallor. Normocephalic, atraumatic. No pharyngeal erythema. No 

thyromegaly. 


CARDIOVASCULAR: S1 and S2 present. No murmurs, rubs, or gallops. 


PULMONARY: Chest is clear to auscultation, no wheezing or crackles. 


ABDOMEN: Soft, nontender, nondistended, normoactive bowel sounds. No palpable 

organomegaly. 


MUSCULOSKELETAL: No joint swelling or deformity.


EXTREMITIES: No cyanosis, clubbing, or pedal edema. 


NEUROLOGICAL: Gross neurological examination did not reveal any focal deficits. 


SKIN: No rashes. 














- Labs


CBC & Chem 7: 


                                 08/24/19 12:25





                                 08/24/19 07:27


Labs: 


                  Abnormal Lab Results - Last 24 Hours (Table)











  08/23/19 08/23/19 08/24/19 Range/Units





  16:39 20:03 07:05 


 


POC Glucose (mg/dL)  128 H  160 H  110 H  (75-99)  mg/dL














  08/24/19 Range/Units





  11:46 


 


POC Glucose (mg/dL)  160 H  (75-99)  mg/dL








                      Microbiology - Last 24 Hours (Table)











 08/21/19 16:00 Anaerobic Culture - Preliminary





 Pleural Fluid 


 


 08/21/19 16:00 Gram Stain - Preliminary





 Pleural Fluid Body Fluid Culture - Preliminary














Assessment and Plan


Assessment: 


1.  Large left-sided pleural effusion; status post thoracentesis with chest tube

placement


- Patient is status post Pleurx catheter placement which is to suction draining 

serous sanguinous fluid with air leak


- We will fluid cytology has been negative for malignant cells


- Patient remains on empiric IV antibiotic treatment in form of Zosyn for 

possible empyema


- Thoracic surgery is consulted for further evaluation and recommendations





2.  Postoperative pneumothorax/ possible trapped lung


- Thoracic surgery is on board; plan is to discuss further plan of care with 

pulmonary service for possible bronchoscopy versus thoracotomy


- Chest tube to suction with intermittently





3.  Left midlung masslike consolidation; cytology is negative for cancer cells 

so far





4.  Diabetes mellitus type 2; fairly stable





5.  Hypertension; stable on metoprolol





6.  DVT prophylaxis; SCDs only





CODE STATUS; full code





Time with Patient: Greater than 30

## 2019-08-24 NOTE — P.PN
Subjective


Progress Note Date: 08/24/19


Principal diagnosis: 





Left-sided pleural effusion with near complete collapse of the left lung, 

possibly malignant, status post left thoracentesis with 2 L of fluid removed by 

pulmonary medicine, past medical history significant for hypertension, 

hyperlipidemia, previous CVA, history of GI bleed, history of diverticulitis 

status post low anterior resection, recent nephrolithiasis with a recent renal 

study showing a 3 mm nonobstructing calculus in the left lower pole of the left 

kidney





POD #2 ultrasound-guided chest tube insertion by interventional radiology.





Leukocytosis, unknown etiology





The patient is sitting up to the bedside chair.  He is in no acute distress.  He

remains hemodynamically stable.  Left chest pigtail catheter remains in place to

low continuous wall suction -20 cm H2O.  Draining thin serosanguineous drainage.

 Intermittent air leak is present.  Cytology results from the pleural fluid 

showed mixed inflammatory cell population with abundant reactive mesothelial 

cells and occasional pigmented histiocytes. It was negative for cells 

cytologically diagnostic of a neoplasm  This was discussed with the patient, the

patient's son and wife present at his bedside by Dr. Serna. Oxygen saturations 

are 97% on room air.  He is achieving 1500 mL on his incentive spirometry.  He 

denies any complaints of pain or shortness of breath this time.





Objective





- Vital Signs


Vital signs: 


                                   Vital Signs











Temp  98.2 F   08/24/19 07:00


 


Pulse  70   08/24/19 07:00


 


Resp  17   08/24/19 07:00


 


BP  165/92   08/24/19 07:00


 


Pulse Ox  97   08/24/19 07:00








                                 Intake & Output











 08/23/19 08/24/19 08/24/19





 18:59 06:59 18:59


 


Intake Total 450 480 


 


Output Total 360 1050 


 


Balance 90 -570 


 


Weight 109.316 kg  


 


Intake:   


 


  Oral 450 480 


 


Output:   


 


  Chest Tube Drainage 160 50 


 


    left posterior chest 160 50 


 


  Urine 200 1000 


 


Other:   


 


  Voiding Method Toilet Toilet 





 Urinal Urinal 


 


  # Voids  2 














- Constitutional


General appearance: Present: cooperative, no acute distress, obese





- Respiratory


Details: 





Lungs sounds essentially clear throughout, diminished to his left lower lobe.  

Respirations are symmetrical and nonlabored.  Left posterior pigtail chest tube 

catheter in place to low continuous wall suction -20 cm H2O.  Intermittent air 

leak is present.  Draining thin serosanguineous drainage with 200 milliliters of

fluid drained in the last 24 hours.





- Cardiovascular


Details: 





Regular rhythm and rate.  S1 and S2 present, negative for S3, gallop or murmur. 

No edema present.





- Gastrointestinal


Gastrointestinal Comment(s): 





Abdomen is soft, nontender and nondistended.  Active bowel sounds present in all

4 quadrants.  No guarding or rigidity.  No organomegaly appreciated.





- Genitourinary


Genitourinary Comment(s): 





Voiding clear yellow urine.





- Integumentary


Integumentary Comment(s): 








Skin is warm and dry.  No clubbing or cyanosis is present.  No rash or abnormal 

pigmentation is present.  Pigtail catheter dressing clean, dry and intact.





- Neurologic


Neurologic: Present: CNII-XII intact





- Musculoskeletal


Musculoskeletal: Present: gait normal, generalized weakness, right sided 

weakness





- Psychiatric


Psychiatric: Present: A&O x's 3, appropriate affect, intact judgment & insight





- Allied health notes


Allied health notes reviewed: nursing





- Labs


CBC & Chem 7: 


                                 08/24/19 12:25





                                 08/24/19 07:27


Labs: 


                  Abnormal Lab Results - Last 24 Hours (Table)











  08/23/19 08/23/19 08/24/19 Range/Units





  16:39 20:03 07:05 


 


WBC     (3.8-10.6)  k/uL


 


Neutrophils #     (1.3-7.7)  k/uL


 


Lymphocytes #     (1.0-4.8)  k/uL


 


POC Glucose (mg/dL)  128 H  160 H  110 H  (75-99)  mg/dL














  08/24/19 08/24/19 Range/Units





  11:46 12:25 


 


WBC   21.2 H  (3.8-10.6)  k/uL


 


Neutrophils #   19.3 H  (1.3-7.7)  k/uL


 


Lymphocytes #   0.7 L  (1.0-4.8)  k/uL


 


POC Glucose (mg/dL)  160 H   (75-99)  mg/dL








                      Microbiology - Last 24 Hours (Table)











 08/21/19 16:00 Anaerobic Culture - Preliminary





 Pleural Fluid 


 


 08/21/19 16:00 Gram Stain - Preliminary





 Pleural Fluid Body Fluid Culture - Preliminary














- Imaging and Cardiology


Chest x-ray: report reviewed, image reviewed





Assessment and Plan


Assessment: 





1.  Left-sided pleural effusion with near complete collapse of the left lung, 

possibly malignant , status post left thoracentesis 2 L of fluid removed, status

post left chest pigtail catheter placement per interventional radiology


2.  Left-sided pneumothorax, status post thoracentesis, possible trapped lung


3.  History of CVA, no residual deficits


4.  History of diverticulitis and GI bleeding requiring low anterior resection 

October 18 2018.


5.  History of iron deficiency anemia secondary to GI blood losses


6.  History of masslike collection within the abdomen measuring 87 cm between t

he sigmoid and urinary bladder treated mostly with antibiotics.


7.  History of hypertension


8.  Hyperlipidemia


9.  Leukocytosis, unknown etiology


Plan: 





1.  Continue left pigtail catheter to low continuous wall suction -20 cm H2O.


2.  Computed tomography scan reviewed with the patient by Dr. Serna.


3.  Pleural fluid cytology results showed mixed inflammatory cell population 

with abundant reactive mesothelial cells and occasional pigmented histiocytes.  

Negative for cells cytologically diagnostic for of neoplasm.  Dr. Serna 

discussed the cytology results with the patient, his sons and his wife.


4.  Pulmonary management per Dr. Bolanos.  Dr. Serna spoke with Dr. Bolanos 

regarding a care plan and the patient will undergo a bronchoscopy for further 

diagnostic workup on Monday, 08/26/2019.


5.  Encourage use of his incentive spirometry every hour while awake.


6.  More recommendations to follow based on patient's clinical course.


Time with Patient: Greater than 30

## 2019-08-25 LAB
ANION GAP SERPL CALC-SCNC: 8 MMOL/L
BASOPHILS # BLD AUTO: 0 K/UL (ref 0–0.2)
BASOPHILS NFR BLD AUTO: 0 %
BUN SERPL-SCNC: 33 MG/DL (ref 9–20)
CALCIUM SPEC-MCNC: 9 MG/DL (ref 8.4–10.2)
CHLORIDE SERPL-SCNC: 107 MMOL/L (ref 98–107)
CO2 SERPL-SCNC: 27 MMOL/L (ref 22–30)
EOSINOPHIL # BLD AUTO: 0 K/UL (ref 0–0.7)
EOSINOPHIL NFR BLD AUTO: 0 %
ERYTHROCYTE [DISTWIDTH] IN BLOOD BY AUTOMATED COUNT: 4.95 M/UL (ref 4.3–5.9)
ERYTHROCYTE [DISTWIDTH] IN BLOOD: 14.9 % (ref 11.5–15.5)
GLUCOSE BLD-MCNC: 103 MG/DL (ref 75–99)
GLUCOSE BLD-MCNC: 124 MG/DL (ref 75–99)
GLUCOSE BLD-MCNC: 130 MG/DL (ref 75–99)
GLUCOSE BLD-MCNC: 87 MG/DL (ref 75–99)
GLUCOSE SERPL-MCNC: 111 MG/DL (ref 74–99)
HCT VFR BLD AUTO: 45.9 % (ref 39–53)
HGB BLD-MCNC: 14.6 GM/DL (ref 13–17.5)
LYMPHOCYTES # SPEC AUTO: 0.6 K/UL (ref 1–4.8)
LYMPHOCYTES NFR SPEC AUTO: 3 %
MCH RBC QN AUTO: 29.4 PG (ref 25–35)
MCHC RBC AUTO-ENTMCNC: 31.7 G/DL (ref 31–37)
MCV RBC AUTO: 92.7 FL (ref 80–100)
MONOCYTES # BLD AUTO: 0.5 K/UL (ref 0–1)
MONOCYTES NFR BLD AUTO: 3 %
NEUTROPHILS # BLD AUTO: 17.9 K/UL (ref 1.3–7.7)
NEUTROPHILS NFR BLD AUTO: 94 %
PLATELET # BLD AUTO: 300 K/UL (ref 150–450)
POTASSIUM SERPL-SCNC: 4.2 MMOL/L (ref 3.5–5.1)
SODIUM SERPL-SCNC: 142 MMOL/L (ref 137–145)
WBC # BLD AUTO: 19.2 K/UL (ref 3.8–10.6)

## 2019-08-25 RX ADMIN — CEFAZOLIN SCH: 330 INJECTION, POWDER, FOR SOLUTION INTRAMUSCULAR; INTRAVENOUS at 19:44

## 2019-08-25 RX ADMIN — INSULIN ASPART SCH: 100 INJECTION, SOLUTION INTRAVENOUS; SUBCUTANEOUS at 11:48

## 2019-08-25 RX ADMIN — PIPERACILLIN AND TAZOBACTAM SCH MLS/HR: 3; .375 INJECTION, POWDER, FOR SOLUTION INTRAVENOUS at 16:35

## 2019-08-25 RX ADMIN — METHYLPREDNISOLONE SODIUM SUCCINATE SCH MG: 125 INJECTION, POWDER, FOR SOLUTION INTRAMUSCULAR; INTRAVENOUS at 18:12

## 2019-08-25 RX ADMIN — METHYLPREDNISOLONE SODIUM SUCCINATE SCH MG: 125 INJECTION, POWDER, FOR SOLUTION INTRAMUSCULAR; INTRAVENOUS at 05:03

## 2019-08-25 RX ADMIN — METOPROLOL SUCCINATE SCH MG: 50 TABLET, EXTENDED RELEASE ORAL at 08:13

## 2019-08-25 RX ADMIN — PIPERACILLIN AND TAZOBACTAM SCH MLS/HR: 3; .375 INJECTION, POWDER, FOR SOLUTION INTRAVENOUS at 08:13

## 2019-08-25 RX ADMIN — ACETAMINOPHEN PRN MG: 325 TABLET, FILM COATED ORAL at 16:34

## 2019-08-25 RX ADMIN — METHYLPREDNISOLONE SODIUM SUCCINATE SCH MG: 125 INJECTION, POWDER, FOR SOLUTION INTRAMUSCULAR; INTRAVENOUS at 12:02

## 2019-08-25 RX ADMIN — INSULIN ASPART SCH: 100 INJECTION, SOLUTION INTRAVENOUS; SUBCUTANEOUS at 20:32

## 2019-08-25 RX ADMIN — INSULIN ASPART SCH: 100 INJECTION, SOLUTION INTRAVENOUS; SUBCUTANEOUS at 16:35

## 2019-08-25 RX ADMIN — INSULIN ASPART SCH: 100 INJECTION, SOLUTION INTRAVENOUS; SUBCUTANEOUS at 06:50

## 2019-08-25 RX ADMIN — SODIUM CHLORIDE SCH MLS/HR: 9 INJECTION, SOLUTION INTRAVENOUS at 16:35

## 2019-08-25 NOTE — P.PN
Subjective


Progress Note Date: 08/25/19


Principal diagnosis: 


Large left-sided Pleural effusion with lung collapse; status post thoracentesis 

and Pleurx catheter placement with postoperative pneumothorax


Masslike consolidation in left midlung


Patient is seen and evaluated in the room at bedside with multiple family membe

rs including 2 sons and wife; detailed discussion about further plan of care and

prognosis was discussed and all questions by family were answered to 

satisfaction; patient is sitting up in bedside chair and denies any complaint of

chest pain or shortness of breath





24-hour interval change 08/25/2019


Patient is seen and evaluated in the room with family members at bedside; she 

denies any complaint of chest shortness of breath at this time; left chest 

pigtail catheter in place


Vital signs remained stable with a temperature of 98.2, pulse 67, respiration 15

and blood pressure 149/84; SpO2 of 99% on room air


Lab review shows a white blood count of 19.2 which is improved from 21.2 

yesterday; sodium at 142 potassium 4.2 with B UN of 33 with creatinine of 0.97


Pleural fluid cytology shows mixed inflammatory cells with abundant reactive 

mesothelial cells and occasional pigmented histocytes; no malignant cells are 

seen


Thoracic surgeon pulmonary R following and patient is scheduled for bronchoscopy

tomorrow morning





Objective





- Vital Signs


Vital signs: 


                                   Vital Signs











Temp  98.2 F   08/25/19 15:00


 


Pulse  67   08/25/19 15:00


 


Resp  15   08/25/19 15:00


 


BP  149/84   08/25/19 15:00


 


Pulse Ox  99   08/25/19 15:00








                                 Intake & Output











 08/24/19 08/25/19 08/25/19





 18:59 06:59 18:59


 


Intake Total 480 420 


 


Output Total 125 40 660


 


Balance 355 380 -660


 


Intake:   


 


   420 


 


    Piperacillin-Tazobactam 3 100 100 





    .375 gm In Sodium   





    Chloride 0.9% 100 ml @ 25   





    mls/hr IVPB Q8HR [facility] Rx#   





    :[id number]   


 


    Sodium Chloride 0.9% 1, 160 320 





    000 ml @ 20 mls/hr IV .   





    Q24H [facility] Rx#:[id number]   


 


  Oral 220  


 


Output:   


 


  Chest Tube Drainage 125 40 110


 


    left posterior chest 125 40 110


 


  Urine   550


 


Other:   


 


  Voiding Method  Toilet 





  Urinal 














- Exam








PHYSICAL EXAMINATION: 





GENERAL: The patient is alert and oriented x3, not in any acute distress. Well 

developed, well nourished. 


HEENT: Pupils are round and equally reacting to light. EOMI. No scleral icterus.

No conjunctival pallor. Normocephalic, atraumatic. No pharyngeal erythema. No 

thyromegaly. 


CARDIOVASCULAR: S1 and S2 present. No murmurs, rubs, or gallops. 


PULMONARY: Chest is clear to auscultation, no wheezing or crackles. 


ABDOMEN: Soft, nontender, nondistended, normoactive bowel sounds. No palpable 

organomegaly. 


MUSCULOSKELETAL: No joint swelling or deformity.


EXTREMITIES: No cyanosis, clubbing, or pedal edema. 


NEUROLOGICAL: Gross neurological examination did not reveal any focal deficits. 


SKIN: No rashes. 














- Labs


CBC & Chem 7: 


                                 08/25/19 07:25





                                 08/25/19 07:25


Labs: 


                  Abnormal Lab Results - Last 24 Hours (Table)











  08/24/19 08/25/19 08/25/19 Range/Units





  20:46 06:47 07:25 


 


WBC     (3.8-10.6)  k/uL


 


Neutrophils #     (1.3-7.7)  k/uL


 


Lymphocytes #     (1.0-4.8)  k/uL


 


BUN    33 H  (9-20)  mg/dL


 


Glucose    111 H  (74-99)  mg/dL


 


POC Glucose (mg/dL)  176 H  103 H   (75-99)  mg/dL














  08/25/19 Range/Units





  07:25 


 


WBC  19.2 H  (3.8-10.6)  k/uL


 


Neutrophils #  17.9 H  (1.3-7.7)  k/uL


 


Lymphocytes #  0.6 L  (1.0-4.8)  k/uL


 


BUN   (9-20)  mg/dL


 


Glucose   (74-99)  mg/dL


 


POC Glucose (mg/dL)   (75-99)  mg/dL








                      Microbiology - Last 24 Hours (Table)











 08/21/19 16:00 Gram Stain - Preliminary





 Pleural Fluid Body Fluid Culture - Preliminary














Assessment and Plan


Assessment: 


1.  Large left-sided pleural effusion; status post thoracentesis with chest tube

placement


- Patient is status post Pleurx catheter placement which is to suction draining 

serous sanguinous fluid with air leak


- We will fluid cytology has been negative for malignant cells


- Patient remains on empiric IV antibiotic treatment in form of Zosyn for 

possible empyema


- Thoracic surgery is consulted for further evaluation and recommendations





2.  Postoperative pneumothorax/ possible trapped lung


- Thoracic surgery is on board; plan is to discuss further plan of care with 

pulmonary service for possible bronchoscopy versus thoracotomy


- Chest tube to suction with intermittently





3.  Left midlung masslike consolidation; cytology is negative for cancer cells 

so far





4.  Diabetes mellitus type 2; fairly stable





5.  Hypertension; stable on metoprolol





6.  DVT prophylaxis; SCDs only





CODE STATUS; full code





Time with Patient: Greater than 30

## 2019-08-25 NOTE — P.PN
Subjective


Progress Note Date: 08/25/19


Principal diagnosis: 





Left-sided pleural effusion with near complete collapse of the left lung, 

possibly malignant, status post left thoracentesis with 2 L of fluid removed by 

pulmonary medicine, past medical history significant for hypertension, 

hyperlipidemia, previous CVA, history of GI bleed, history of diverticulitis 

status post low anterior resection, recent nephrolithiasis with a recent renal 

study showing a 3 mm nonobstructing calculus in the left lower pole of the left 

kidney





POD #3 ultrasound-guided chest tube insertion by interventional radiology.





Leukocytosis, unknown etiology





The patient is sitting up to the bedside chair.  He is in no acute distress.  He

remains hemodynamically stable.  Left chest pigtail catheter remains in place to

low continuous wall suction -20 cm H2O.  Draining thin serosanguineous drainage.

 Intermittent air leak is present.  Cytology results from the pleural fluid 

showed mixed inflammatory cell population with abundant reactive mesothelial 

cells and occasional pigmented histiocytes. It was negative for cells 

cytologically diagnostic of a neoplasm Oxygen saturations are 99% on room air.  

He is achieving 2000 mL on his incentive spirometry.  He denies any complaints 

of pain or shortness of breath this time.  The patient's sons are at his 

bedside.  He is scheduled for a bronchoscopy procedure to be performed by Dr. Bolanos tomorrow 08/26/2019.





Objective





- Vital Signs


Vital signs: 


                                   Vital Signs











Temp  98.4 F   08/25/19 07:00


 


Pulse  63   08/25/19 07:00


 


Resp  16   08/25/19 07:00


 


BP  153/94   08/25/19 07:00


 


Pulse Ox  99   08/25/19 07:00








                                 Intake & Output











 08/24/19 08/25/19 08/25/19





 18:59 06:59 18:59


 


Intake Total 480 420 


 


Output Total 125 40 110


 


Balance 355 380 -110


 


Intake:   


 


   420 


 


    Piperacillin-Tazobactam 3 100 100 





    .375 gm In Sodium   





    Chloride 0.9% 100 ml @ 25   





    mls/hr IVPB Q8HR [facility] Rx#   





    :[id number]   


 


    Sodium Chloride 0.9% 1, 160 320 





    000 ml @ 20 mls/hr IV .   





    Q24H [facility] Rx#:[id number]   


 


  Oral 220  


 


Output:   


 


  Chest Tube Drainage 125 40 110


 


    left posterior chest 125 40 110


 


Other:   


 


  Voiding Method  Toilet 





  Urinal 














- Constitutional


General appearance: Present: cooperative, no acute distress, obese





- Neck


Details: 





Lungs sounds essentially clear throughout, diminished to his left lower lobe.  

Respirations are symmetrical and nonlabored.  Oxygen saturation is 99% on room 

air.  Achieving 2001 L on his incentive spirometry.  Left posterior pigtail 

chest tube catheter in place to low continuous wall suction -20 cm H2O.  

Intermittent air leak is present.  Draining thin serosanguineous drainage with 

150 mL of fluid drained in the last 24 hours.





- Cardiovascular


Details: 





Regular rhythm and rate.  S1 and S2 present, negative for S3, gallop or murmur. 

No edema present.





- Gastrointestinal


Gastrointestinal Comment(s): 





Abdomen is soft, nontender and nondistended.  Active bowel sounds present in all

4 quadrants.  No guarding or rigidity.  No organomegaly appreciated.





- Genitourinary


Genitourinary Comment(s): 





Voiding clear yellow urine.





- Integumentary


Integumentary Comment(s): 





Skin is warm and dry.  No clubbing or cyanosis is present.  No rash or abnormal 

pigmentation is present. Left chest pigtail catheter dressing clean, dry and 

intact.





- Neurologic


Neurologic: Present: CNII-XII intact





- Musculoskeletal


Musculoskeletal: Present: gait normal, strength equal bilaterally





- Psychiatric


Psychiatric: Present: A&O x's 3, appropriate affect, intact judgment & insight





- Allied health notes


Allied health notes reviewed: nursing





- Labs


CBC & Chem 7: 


                                 08/25/19 07:25





                                 08/25/19 07:25


Labs: 


                  Abnormal Lab Results - Last 24 Hours (Table)











  08/24/19 08/25/19 08/25/19 Range/Units





  20:46 06:47 07:25 


 


WBC     (3.8-10.6)  k/uL


 


Neutrophils #     (1.3-7.7)  k/uL


 


Lymphocytes #     (1.0-4.8)  k/uL


 


BUN    33 H  (9-20)  mg/dL


 


Glucose    111 H  (74-99)  mg/dL


 


POC Glucose (mg/dL)  176 H  103 H   (75-99)  mg/dL














  08/25/19 Range/Units





  07:25 


 


WBC  19.2 H  (3.8-10.6)  k/uL


 


Neutrophils #  17.9 H  (1.3-7.7)  k/uL


 


Lymphocytes #  0.6 L  (1.0-4.8)  k/uL


 


BUN   (9-20)  mg/dL


 


Glucose   (74-99)  mg/dL


 


POC Glucose (mg/dL)   (75-99)  mg/dL








                      Microbiology - Last 24 Hours (Table)











 08/21/19 16:00 Gram Stain - Preliminary





 Pleural Fluid Body Fluid Culture - Preliminary














- Imaging and Cardiology


Chest x-ray: report reviewed, image reviewed





Assessment and Plan


Assessment: 





1.  Left-sided pleural effusion with near complete collapse of the left lung, 

possibly malignant , status post left thoracentesis 2 L of fluid removed, status

post left chest pigtail catheter placement per interventional radiology


2.  Left-sided pneumothorax, status post thoracentesis, possible trapped lung


3.  History of CVA, no residual deficits


4.  History of diverticulitis and GI bleeding requiring low anterior resection 

October 18 2018.


5.  History of iron deficiency anemia secondary to GI blood losses


6.  History of masslike collection within the abdomen measuring 87 cm between 

the sigmoid and urinary bladder treated mostly with antibiotics.


7.  History of hypertension


8.  Hyperlipidemia


9.  Leukocytosis, unknown etiology


Plan: 





1.  Continue left pigtail catheter to low continuous wall suction -20 cm H2O.


2.  Patient is scheduled for a bronchoscopy procedure tomorrow Monday, 

08/26/2018 to be performed by Dr. Bolanos


3.  Encourage activity as tolerated.  Out of bed for all meals.


4.  Pulmonary management per Dr. Bolanos. 


5.  Encourage use of his incentive spirometry every hour while awake.


6.  DVT and GI prophylaxis.


7.  More recommendations to follow based on patient's clinical course.


Time with Patient: Greater than 30

## 2019-08-25 NOTE — XR
EXAMINATION TYPE: XR chest 1V portable

 

DATE OF EXAM: 8/25/2019

 

CLINICAL HISTORY: Difficulty breathing progress study. Left-sided pigtail catheter and effusion. 

 

TECHNIQUE: Single AP portable upright view of the chest is obtained.

 

COMPARISON: Chest x-ray from one day earlier. CT chest from 2 days earlier.

 

FINDINGS:  Loculated lateral left basilar pneumothorax remains present with lateral opacity. Right haven
ng remains clear. Background chronic emphysematous change. Cardiac silhouette size is stable and mild
ly enlarged. No mediastinal shift is present. Osseous structures are demineralized.

 

IMPRESSION: Stable fairly moderate to large size left-sided pneumothorax most prominent lateral basil
ar aspect. There is associated left lung atelectasis and/or infiltrate. No mediastinal shift. Backgro
und chronic emphysematous change and mild cardiomegaly noted.

## 2019-08-25 NOTE — P.PN
Subjective


Progress Note Date: 08/25/19


Principal diagnosis: 


Large left pleural effusion





This is a 79-year-old white male with history of previous CVA, hypertension, 

hyperlipidemia,previous history of GI bleeding,history of nephrolithiasis, and 

recent renal study showing 3 mm nonobstructing calculus in the left lower pole 

of the left kidney.during the study, the patient was noted to have left-sided 

pleural effusion, hence he was advised to have a CT of the chest.this was done 

today on outpatient basis, and there was a significant collapse of the left lung

with near complete filling of the left sided pleural space with massive pleural 

effusion.  The only area was 90 portion of the left apex that was noted to be 

aerated based on the CT of the chest.  Mass lesion could not be entirely ruled 

out.  Hence the patient was advised to go to the ER, chest x-ray confirmed what 

was seen on the CT of the chest again, and the patient was admitted.  I went 

down to see the patient in the ER, and I performed a left-sided thoracentesis, I

was able to drain about 2000 mL of greenish color pleural effusion, and follow-

up chest x-ray showed residual pleural effusion which I plan to evaluate by 

ultrasound in the morning, may need another thoracentesis.  Patient will be 

admitted, and the fluid that I drained from the left pleural space was sent for 

different diagnostic studies.patient was last admitted to the hospital in 

October of 2018,back then the patient had low anterior resection for 

diverticulitis, and for ongoing iron deficiency anemia.  Back then he had a 

masslike collection within the abdomen measuring 87 cm between the sigmoid and 

the urinary bladder treated with antibiotics and outpatient setting.pulmonary-

wise, the patient denies any cough, no fever, no chills, no hemoptysis, no 

weight loss, he has some shortness of breath on exertion, and left-sided 

pleuritic chest pain.  This has been going on for the last few days.no history 

of underlying malignancy.





The patient is seen today 08/22/2019 in follow-up on the observation unit.  He 

is currently awake and alert in no acute distress.  He is maintaining good O2 

saturations in the 90s on room air.  He is status post left-sided thoracentesis 

yesterday with 2000 mL of greenish colored pleural fluid removed.  Cultures and 

Gram stain are pending.  Fluid analysis reveals less than 4 glucose, 6300 

nucleated cells, 4 poly-nuclear WBCs, 92 mononuclear WBCs.  Fluid protein 6.6.  

.  Bilirubin pending. Exudative in nature.  No fever, no chills, no night

sweats.  He is currently on IV Solu-Medrol.  9 normal saline at 20 ML's.





On 08/23/2019 patient is seen in follow-up on the surgical floor.  Yesterday 

left-sided pigtail chest tube was inserted by interventional radiology, and was 

connected to Pleur-evac which was placed to suction, and patient has since put 

out additional 1650 ML of dark colored pleural fluid.  Cytology still pending, 

cultures remain negative thus far.  Patient denies any chest pain, denies any 

dyspnea, he states his breathing has much improved, he is currently remains on 

room air, with a pulse ox of 95%, hemodynamically stable, lung sounds reveal an 

air entry on the left, with diminished breath sounds at the left base.  We'll 

obtain incentive spirometer, patient will be encouraged to deep breathing cough,

remains on empiric antibiotics in the form of Zosyn, and IV steroids, today's 

labs have been reviewed showing leukocytosis, of 31.6, possibly steroid induced,

electrolytes are within normal limits, BUN 31 and creatinine is 0.96.





On 08/25/2019 patient seen in follow-up on medical surgical floor.  He is awake 

and alert, in no acute distress, room air pulse ox is 99%, denies any shortness 

of breath, denies any chest pain, afebrile.  There has been 210 mL of dark 

pleural fluid out of the left-sided pigtail chest tube catheter in the last 24 

hours, it is 12 suction, no evidence of air leak, today's chest x-ray has been 

reviewed showing stable fairly moderate to large sized left-sided pneumothorax 

at lateral basilar aspect.  Clinically patient is asymptomatic, fluid for 

cytology was negative for any cytologically malignant cells, pleural fluid 

cultures are negative, patient denies any cough or chest congestion, he has been

scheduled for bronchoscopy with BAL tomorrow on only 25 2019 with Dr. Delong. 











Objective





- Vital Signs


Vital signs: 


                                   Vital Signs











Temp  98.4 F   08/25/19 07:00


 


Pulse  63   08/25/19 07:00


 


Resp  16   08/25/19 07:00


 


BP  153/94   08/25/19 07:00


 


Pulse Ox  99   08/25/19 07:00








                                 Intake & Output











 08/24/19 08/25/19 08/25/19





 18:59 06:59 18:59


 


Intake Total 480 420 


 


Output Total 125 40 40


 


Balance 355 380 -40


 


Intake:   


 


   420 


 


    Piperacillin-Tazobactam 3 100 100 





    .375 gm In Sodium   





    Chloride 0.9% 100 ml @ 25   





    mls/hr IVPB Q8HR [facility] Rx#   





    :[id number]   


 


    Sodium Chloride 0.9% 1, 160 320 





    000 ml @ 20 mls/hr IV .   





    Q24H [facility] Rx#:[id number]   


 


  Oral 220  


 


Output:   


 


  Chest Tube Drainage 125 40 40


 


    left posterior chest 125 40 40


 


Other:   


 


  Voiding Method  Toilet 





  Urinal 














- Exam


 GENERAL EXAM: Alert, pleasant, 79-year-old white male, on room air, in no acute

distress, comfortable in no apparent distress.


HEAD: Normocephalic/atraumatic.


EYES: Normal reaction of pupils, equal size.  Conjunctiva pink, sclera white.


NOSE: Clear with pink turbinates.


THROAT: No erythema or exudates.


NECK: No masses, no JVD, no thyroid enlargement, no adenopathy.


CHEST: No chest wall deformity.  Symmetrical expansion.  Left posterior pigtail 

chest tube catheter connected to a Pleur-evac with  dark pleural fluid, Pleur-

evac to wall suction, no evidence of air leak


LUNGS: Equal air entry with no crackles, wheeze, rhonchi or dullness.


CVS: Regular rate and rhythm, normal S1 and S2, no gallops, no murmurs, no rubs


ABDOMEN: Soft, nontender.  No hepatosplenomegaly, normal bowel sounds, no 

guarding or rigidity.


EXTREMITIES: No clubbing, no edema, no cyanosis, 2+ pulses and upper and lower 

extremities.


MUSCULOSKELETAL: Muscle strength and tone normal.


SPINE: No scoliosis or deformity


SKIN: No rashes


CENTRAL NERVOUS SYSTEM: Alert and oriented -3.  No focal deficits, tone is 

normal in all 4 extremities.


PSYCHIATRIC: Alert and oriented -3.  Appropriate affect.  Intact judgment and 

insight.











- Labs


CBC & Chem 7: 


                                 08/25/19 07:25





                                 08/25/19 07:25


Labs: 


                  Abnormal Lab Results - Last 24 Hours (Table)











  08/24/19 08/24/19 08/24/19 Range/Units





  11:46 12:25 20:46 


 


WBC   21.2 H   (3.8-10.6)  k/uL


 


Neutrophils #   19.3 H   (1.3-7.7)  k/uL


 


Lymphocytes #   0.7 L   (1.0-4.8)  k/uL


 


BUN     (9-20)  mg/dL


 


Glucose     (74-99)  mg/dL


 


POC Glucose (mg/dL)  160 H   176 H  (75-99)  mg/dL














  08/25/19 08/25/19 08/25/19 Range/Units





  06:47 07:25 07:25 


 


WBC    19.2 H  (3.8-10.6)  k/uL


 


Neutrophils #    17.9 H  (1.3-7.7)  k/uL


 


Lymphocytes #    0.6 L  (1.0-4.8)  k/uL


 


BUN   33 H   (9-20)  mg/dL


 


Glucose   111 H   (74-99)  mg/dL


 


POC Glucose (mg/dL)  103 H    (75-99)  mg/dL








                      Microbiology - Last 24 Hours (Table)











 08/21/19 16:00 Gram Stain - Preliminary





 Pleural Fluid Body Fluid Culture - Preliminary














Assessment and Plan


Plan: 


 Assessment:





1 Left-sided pleural effusion with near complete collapse of the left lung, 

possibly malignant , status post left thoracentesis 2 L of fluid removed, and 

the fluid was sent for different diagnostic studies.  Patient is status post 

thoracentesis would removal of 2 L of pleural fluid, with additional fluid 

remaining in the left pleural space, status post left chest pigtail chest tube, 

no cytologically malignant cells on the pleural fluid cytology, cultures are 

negative thus far.





2 left-sided pneumothorax, status post thoracentesis, trapped lung





3 previous history of CVA, presently asymptomatic.





4 history of diverticulitis and GI bleeding requiring low anterior resection 

October 18 2018.





5 history of iron deficiency anemia secondary to GI blood losses





6 history of masslike collection within the abdomen measuring 87 cm between the

sigmoid and urinary bladder treated mostly with antibiotics.





7 benign essential hypertension





8 hyperlipidemia





Plan:





Continue encouraging incentive spirometry use, deep breathing and coughing, 

continue with empiric antibiotics, patient is on a schedule bronchoscopy with 

BAL tomorrow by Dr. Delong.  Nothing by mouth after midnight.  Continue with 

breathing treatments.  We'll follow





I performed a history & physical examination of the patient and discussed their 

management with my nurse practitioner, Lidia Lassiter.  I reviewed the nurse 

practitioner's note and agree with the documented findings and plan of care.  

Lung sounds are positive for diminished breath sounds.  The findings and the 

impression was discussed with the patient.  I attest to the documentation by the

nurse practitioner. 





Time with Patient: Less than 30

## 2019-08-26 LAB
ANION GAP SERPL CALC-SCNC: 5 MMOL/L
BASOPHILS # BLD AUTO: 0 K/UL (ref 0–0.2)
BASOPHILS NFR BLD AUTO: 0 %
BUN SERPL-SCNC: 32 MG/DL (ref 9–20)
CALCIUM SPEC-MCNC: 9 MG/DL (ref 8.4–10.2)
CHLORIDE SERPL-SCNC: 107 MMOL/L (ref 98–107)
CO2 SERPL-SCNC: 29 MMOL/L (ref 22–30)
EOSINOPHIL # BLD AUTO: 0.1 K/UL (ref 0–0.7)
EOSINOPHIL NFR BLD AUTO: 1 %
ERYTHROCYTE [DISTWIDTH] IN BLOOD BY AUTOMATED COUNT: 4.76 M/UL (ref 4.3–5.9)
ERYTHROCYTE [DISTWIDTH] IN BLOOD: 15 % (ref 11.5–15.5)
GLUCOSE BLD-MCNC: 103 MG/DL (ref 75–99)
GLUCOSE SERPL-MCNC: 111 MG/DL (ref 74–99)
HCT VFR BLD AUTO: 43.5 % (ref 39–53)
HGB BLD-MCNC: 13.9 GM/DL (ref 13–17.5)
LYMPHOCYTES # SPEC AUTO: 0.7 K/UL (ref 1–4.8)
LYMPHOCYTES NFR SPEC AUTO: 4 %
MCH RBC QN AUTO: 29.2 PG (ref 25–35)
MCHC RBC AUTO-ENTMCNC: 31.9 G/DL (ref 31–37)
MCV RBC AUTO: 91.4 FL (ref 80–100)
MONOCYTES # BLD AUTO: 0.5 K/UL (ref 0–1)
MONOCYTES NFR BLD AUTO: 3 %
NEUTROPHILS # BLD AUTO: 15.8 K/UL (ref 1.3–7.7)
NEUTROPHILS NFR BLD AUTO: 92 %
NUC CELL # FLD: 6 /UL
PLATELET # BLD AUTO: 279 K/UL (ref 150–450)
POTASSIUM SERPL-SCNC: 5 MMOL/L (ref 3.5–5.1)
SODIUM SERPL-SCNC: 141 MMOL/L (ref 137–145)
WBC # BLD AUTO: 17.2 K/UL (ref 3.8–10.6)

## 2019-08-26 PROCEDURE — 0B9H8ZX DRAINAGE OF LUNG LINGULA, VIA NATURAL OR ARTIFICIAL OPENING ENDOSCOPIC, DIAGNOSTIC: ICD-10-PCS

## 2019-08-26 RX ADMIN — SODIUM CHLORIDE SCH MLS/HR: 9 INJECTION, SOLUTION INTRAVENOUS at 05:58

## 2019-08-26 RX ADMIN — INSULIN ASPART SCH: 100 INJECTION, SOLUTION INTRAVENOUS; SUBCUTANEOUS at 07:17

## 2019-08-26 RX ADMIN — PIPERACILLIN AND TAZOBACTAM SCH MLS/HR: 3; .375 INJECTION, POWDER, FOR SOLUTION INTRAVENOUS at 00:06

## 2019-08-26 RX ADMIN — INSULIN ASPART SCH: 100 INJECTION, SOLUTION INTRAVENOUS; SUBCUTANEOUS at 11:58

## 2019-08-26 RX ADMIN — METHYLPREDNISOLONE SODIUM SUCCINATE SCH MG: 125 INJECTION, POWDER, FOR SOLUTION INTRAMUSCULAR; INTRAVENOUS at 11:56

## 2019-08-26 RX ADMIN — CEFAZOLIN SCH MLS/HR: 330 INJECTION, POWDER, FOR SOLUTION INTRAMUSCULAR; INTRAVENOUS at 14:40

## 2019-08-26 RX ADMIN — PIPERACILLIN AND TAZOBACTAM SCH MLS/HR: 3; .375 INJECTION, POWDER, FOR SOLUTION INTRAVENOUS at 08:28

## 2019-08-26 RX ADMIN — METHYLPREDNISOLONE SODIUM SUCCINATE SCH MG: 125 INJECTION, POWDER, FOR SOLUTION INTRAMUSCULAR; INTRAVENOUS at 00:06

## 2019-08-26 RX ADMIN — METOPROLOL SUCCINATE SCH MG: 50 TABLET, EXTENDED RELEASE ORAL at 08:12

## 2019-08-26 RX ADMIN — METHYLPREDNISOLONE SODIUM SUCCINATE SCH MG: 125 INJECTION, POWDER, FOR SOLUTION INTRAMUSCULAR; INTRAVENOUS at 05:20

## 2019-08-26 RX ADMIN — PIPERACILLIN AND TAZOBACTAM SCH MLS/HR: 3; .375 INJECTION, POWDER, FOR SOLUTION INTRAVENOUS at 15:27

## 2019-08-26 RX ADMIN — SODIUM CHLORIDE SCH MLS/HR: 9 INJECTION, SOLUTION INTRAVENOUS at 22:10

## 2019-08-26 NOTE — XR
EXAMINATION TYPE: XR chest 1V portable

 

DATE OF EXAM: 8/26/2019

 

COMPARISON: 8/25/2019

 

HISTORY: Pneumothorax

 

TECHNIQUE: Single frontal view of the chest is obtained.

 

FINDINGS:  Loculated lateral left basilar pneumothorax remains present with lateral opacity. Right haven
ng remains clear. Background chronic emphysematous change. Cardiac silhouette size is stable and mild
ly enlarged. No mediastinal shift is present. Osseous structures are demineralized. Retrocardiac dens
ity on the left is stable. May represent consolidated lung neoplasm. Arthropathy shoulders.

 

 

IMPRESSION:  

1. Stable large sized left pneumothorax unchanged in appearance.

## 2019-08-26 NOTE — PCN
PROCEDURE NOTE



PROCEDURE:

Bronchoscopy, airway examination and therapeutic lavage, BAL.



PREOPERATIVE DIAGNOSIS:

Rule out endobronchial disease.



POSTOPERATIVE DIAGNOSIS:

Rule out endobronchial disease.



:

Dr. Delong.



Bennie Aguilar, CATARINO, provided general anesthesia, unconscious sedation.



There was informed consent and universal timeout.



PROCEDURE DESCRIPTION:

After the patient was adequately sedated and being fully monitored, the bronchoscope

was inserted through the right nostril.  It passed through the right nasopharynx into

the oropharynx.  The hypopharynx was then identified.  The hypopharyngeal structures,

including anterior commissure, true cords, false cords, arytenoids, piriform sinuses,

right and left, and valleculae were all normal.  After topicalization of the glottic

opening, the bronchoscope was pushed through the glottic opening into the windpipe.

The trachea appeared completely normal.  The tracheal angeles was sharp.  The right and

left mainstem were topicalized.  The right upper lobe and its 3 segments, right middle

lobe and its 2 segments, right lower lobe and its 5 segments, the left upper lobe

proper and its 2 segments, the lingula and its 2 segments, and the left lower lobe and

its 4 segments were very carefully observed.  There were no endobronchial lesions

noted.  There were no masses or tumors.  There was no bleeding.  The mucosa appeared

normal throughout.  There were some minimal secretions noted on the left side.  The

bronchoscope was then wedged into the lingula.  BAL took place.  The patient tolerated

the procedure well.  I took pictures of all the segments of the left lung.  There were

no abnormalities noted and the bronchoscope was then was withdrawn.  There were no

immediate complications.  The patient tolerated the procedure well.



MMODL / IJN: [id number] / Job#: [id number]

## 2019-08-26 NOTE — P.PN
Subjective


Progress Note Date: 08/26/19


Principal diagnosis: 


Large left pleural effusion





This is a 79-year-old white male with history of previous CVA, hypertension, 

hyperlipidemia,previous history of GI bleeding,history of nephrolithiasis, and 

recent renal study showing 3 mm nonobstructing calculus in the left lower pole 

of the left kidney.during the study, the patient was noted to have left-sided 

pleural effusion, hence he was advised to have a CT of the chest.this was done 

today on outpatient basis, and there was a significant collapse of the left lung

with near complete filling of the left sided pleural space with massive pleural 

effusion.  The only area was 90 portion of the left apex that was noted to be 

aerated based on the CT of the chest.  Mass lesion could not be entirely ruled 

out.  Hence the patient was advised to go to the ER, chest x-ray confirmed what 

was seen on the CT of the chest again, and the patient was admitted.  I went 

down to see the patient in the ER, and I performed a left-sided thoracentesis, I

was able to drain about 2000 mL of greenish color pleural effusion, and follow-

up chest x-ray showed residual pleural effusion which I plan to evaluate by 

ultrasound in the morning, may need another thoracentesis.  Patient will be 

admitted, and the fluid that I drained from the left pleural space was sent for 

different diagnostic studies.patient was last admitted to the hospital in 

October of 2018,back then the patient had low anterior resection for 

diverticulitis, and for ongoing iron deficiency anemia.  Back then he had a 

masslike collection within the abdomen measuring 87 cm between the sigmoid and 

the urinary bladder treated with antibiotics and outpatient setting.pulmonary-

wise, the patient denies any cough, no fever, no chills, no hemoptysis, no 

weight loss, he has some shortness of breath on exertion, and left-sided 

pleuritic chest pain.  This has been going on for the last few days.no history 

of underlying malignancy.





The patient is seen today 08/22/2019 in follow-up on the observation unit.  He 

is currently awake and alert in no acute distress.  He is maintaining good O2 

saturations in the 90s on room air.  He is status post left-sided thoracentesis 

yesterday with 2000 mL of greenish colored pleural fluid removed.  Cultures and 

Gram stain are pending.  Fluid analysis reveals less than 4 glucose, 6300 

nucleated cells, 4 poly-nuclear WBCs, 92 mononuclear WBCs.  Fluid protein 6.6.  

.  Bilirubin pending. Exudative in nature.  No fever, no chills, no night

sweats.  He is currently on IV Solu-Medrol.  9 normal saline at 20 ML's.





On 08/23/2019 patient is seen in follow-up on the surgical floor.  Yesterday 

left-sided pigtail chest tube was inserted by interventional radiology, and was 

connected to Pleur-evac which was placed to suction, and patient has since put 

out additional 1650 ML of dark colored pleural fluid.  Cytology still pending, 

cultures remain negative thus far.  Patient denies any chest pain, denies any 

dyspnea, he states his breathing has much improved, he is currently remains on 

room air, with a pulse ox of 95%, hemodynamically stable, lung sounds reveal an 

air entry on the left, with diminished breath sounds at the left base.  We'll 

obtain incentive spirometer, patient will be encouraged to deep breathing cough,

remains on empiric antibiotics in the form of Zosyn, and IV steroids, today's 

labs have been reviewed showing leukocytosis, of 31.6, possibly steroid induced,

electrolytes are within normal limits, BUN 31 and creatinine is 0.96.





On 08/25/2019 patient seen in follow-up on medical surgical floor.  He is awake 

and alert, in no acute distress, room air pulse ox is 99%, denies any shortness 

of breath, denies any chest pain, afebrile.  There has been 210 mL of dark 

pleural fluid out of the left-sided pigtail chest tube catheter in the last 24 

hours, it is 12 suction, no evidence of air leak, today's chest x-ray has been 

reviewed showing stable fairly moderate to large sized left-sided pneumothorax 

at lateral basilar aspect.  Clinically patient is asymptomatic, fluid for 

cytology was negative for any cytologically malignant cells, pleural fluid 

cultures are negative, patient denies any cough or chest congestion, he has been

scheduled for bronchoscopy with BAL tomorrow on only 25 2019 with Dr. Delong. 





On 08/26/2019 patient seen in follow-up on medical surgical floor.  Denies any 

chest pain, or dyspnea, remains on room air, with a pulse ox of 94%, lung sounds

reveal diminished breath sounds over left lower lobe, clear on the right, 

afebrile, cytology of the pleural fluid did not show cytologically malignant 

cells, pleural fluid cultures remain negative to date.  Patient is scheduled for

bronchoscopy with BAL today with Dr. Delong, he is been nothing by mouth since 

midnight.  Vital signs have been stable.  Today's chest x-ray has been reviewed,

showing stable large sized left pneumothorax unchanged in appearance from 

yesterday.  Patient continues on empiric antibiotics form of Zosyn and 

vancomycin











Objective





- Vital Signs


Vital signs: 


                                   Vital Signs











Temp  97.6 F   08/26/19 08:47


 


Pulse  59 L  08/26/19 08:47


 


Resp  16   08/26/19 08:47


 


BP  161/87   08/26/19 08:47


 


Pulse Ox  94 L  08/26/19 08:47








                                 Intake & Output











 08/25/19 08/26/19 08/26/19





 18:59 06:59 18:59


 


Intake Total  980 


 


Output Total 670 550 100


 


Balance -670 430 -100


 


Intake:   


 


  Intake, IV Titration  860 





  Amount   


 


    Piperacillin-Tazobactam 3  100 





    .375 gm In Sodium   





    Chloride 0.9% 100 ml @ 25   





    mls/hr IVPB Q8HR [facility] Rx#   





    :[id number]   


 


    Sodium Chloride 0.9% 1,  260 





    000 ml @ 20 mls/hr IV .   





    Q24H [facility] Rx#:[id number]   


 


    Vancomycin 1,750 mg In  500 





    Sodium Chloride 0.9% 500   





    ml 500 ml @ 167 mls/hr   





    IVPB Q16H [facility] Rx#:   





    [id number]   


 


  Oral  120 


 


Output:   


 


  Chest Tube Drainage 120  100


 


    left posterior chest 120  100


 


  Urine 550 550 


 


Other:   


 


  Voiding Method  Toilet 





  Urinal 


 


  # Voids  1 














- Exam


 GENERAL EXAM: Alert, pleasant, 79-year-old white male, on room air, in no acute

distress, comfortable in no apparent distress.


HEAD: Normocephalic/atraumatic.


EYES: Normal reaction of pupils, equal size.  Conjunctiva pink, sclera white.


NOSE: Clear with pink turbinates.


THROAT: No erythema or exudates.


NECK: No masses, no JVD, no thyroid enlargement, no adenopathy.


CHEST: No chest wall deformity.  Symmetrical expansion.  Left posterior pigtail 

chest tube catheter connected to a Pleur-evac with  dark pleural fluid, Pleur-

evac to wall suction, no evidence of air leak


LUNGS: Equal air entry with no crackles, wheeze, rhonchi or dullness.


CVS: Regular rate and rhythm, normal S1 and S2, no gallops, no murmurs, no rubs


ABDOMEN: Soft, nontender.  No hepatosplenomegaly, normal bowel sounds, no guard

ing or rigidity.


EXTREMITIES: No clubbing, no edema, no cyanosis, 2+ pulses and upper and lower 

extremities.


MUSCULOSKELETAL: Muscle strength and tone normal.


SPINE: No scoliosis or deformity


SKIN: No rashes


CENTRAL NERVOUS SYSTEM: Alert and oriented -3.  No focal deficits, tone is 

normal in all 4 extremities.


PSYCHIATRIC: Alert and oriented -3.  Appropriate affect.  Intact judgment and 

insight.











- Labs


CBC & Chem 7: 


                                 08/26/19 05:36





                                 08/26/19 05:36


Labs: 


                  Abnormal Lab Results - Last 24 Hours (Table)











  08/25/19 08/25/19 08/26/19 Range/Units





  16:35 20:18 05:36 


 


WBC    17.2 H  (3.8-10.6)  k/uL


 


Neutrophils #    15.8 H  (1.3-7.7)  k/uL


 


Lymphocytes #    0.7 L  (1.0-4.8)  k/uL


 


BUN     (9-20)  mg/dL


 


Glucose     (74-99)  mg/dL


 


POC Glucose (mg/dL)  130 H  124 H   (75-99)  mg/dL














  08/26/19 08/26/19 Range/Units





  05:36 06:55 


 


WBC    (3.8-10.6)  k/uL


 


Neutrophils #    (1.3-7.7)  k/uL


 


Lymphocytes #    (1.0-4.8)  k/uL


 


BUN  32 H   (9-20)  mg/dL


 


Glucose  111 H   (74-99)  mg/dL


 


POC Glucose (mg/dL)   103 H  (75-99)  mg/dL








                      Microbiology - Last 24 Hours (Table)











 08/21/19 16:00 Anaerobic Culture - Final





 Pleural Fluid 


 


 08/21/19 16:00 Gram Stain - Final





 Pleural Fluid Body Fluid Culture - Final














Assessment and Plan


Plan: 


 Assessment:





1 Left-sided pleural effusion with near complete collapse of the left lung, 

possibly malignant , status post left thoracentesis 2 L of fluid removed, and 

the fluid was sent for different diagnostic studies.  Patient is status post 

thoracentesis would removal of 2 L of pleural fluid, with additional fluid 

remaining in the left pleural space, status post left chest pigtail chest tube, 

no cytologically malignant cells on the pleural fluid cytology, cultures are 

negative thus far.





2 left-sided pneumothorax, status post thoracentesis, trapped lung





3 previous history of CVA, presently asymptomatic.





4 history of diverticulitis and GI bleeding requiring low anterior resection 

October 18 2018.





5 history of iron deficiency anemia secondary to GI blood losses





6 history of masslike collection within the abdomen measuring 87 cm between the

sigmoid and urinary bladder treated mostly with antibiotics.





7 benign essential hypertension





8 hyperlipidemia





Plan:





We'll proceed with bronchoscopy with BAL today with Dr. Delong to rule out 

endobronchial disease.  Continue empiric antibiotics, pleural fluid cultures 

remain negative today, pleural fluid cytology was negative.  Patient denies any 

dyspnea, no fever or chills.  We'll stop the IV steroids, we'll continue to 

follow.


I performed a history & physical examination of the patient and discussed their 

management with my nurse practitioner, Lidia Lassiter.  I reviewed the nurse 

practitioner's note and agree with the documented findings and plan of care.  

Lung sounds are positive for diminished breath sounds.  The findings and the 

impression was discussed with the patient.  I attest to the documentation by the

nurse practitioner. 





Time with Patient: Less than 30

## 2019-08-26 NOTE — P.PN
Subjective


Progress Note Date: 08/26/19


Principal diagnosis: 





Left-sided pleural effusion with near complete collapse of the left lung, 

possibly malignant, status post left thoracentesis with 2 L of fluid removed by 

pulmonary medicine, past medical history significant for hypertension, 

hyperlipidemia, previous CVA, history of GI bleed, history of diverticulitis 

status post low anterior resection, recent nephrolithiasis with a recent renal 

study showing a 3 mm nonobstructing calculus in the left lower pole of the left 

kidney





POD #4 ultrasound-guided chest tube insertion by interventional radiology.





Leukocytosis, unknown etiology





The patient is sitting up to the bedside chair.  He is in no acute distress.  

Left chest pigtail catheter remains in place to low continuous wall suction -20 

cm H2O.  Draining thin serosanguineous drainage.  Intermittent air leak is 

present.  345 mL output in the last 24 hours.  Pleural fluid cultures remain 

negative.  Oxygen saturations are 94% on room air.  He is achieving 2000 mL on 

his incentive spirometry.  He denies any complaints of pain or shortness of 

breath this time.  He is scheduled for a bronchoscopy today to be completed by 

Dr. Delong from pulmonary medicine





Objective





- Vital Signs


Vital signs: 


                                   Vital Signs











Temp  97.6 F   08/26/19 08:47


 


Pulse  59 L  08/26/19 08:47


 


Resp  16   08/26/19 08:47


 


BP  161/87   08/26/19 08:47


 


Pulse Ox  94 L  08/26/19 08:47








                                 Intake & Output











 08/25/19 08/26/19 08/26/19





 18:59 06:59 18:59


 


Intake Total  980 150


 


Output Total 670 550 100


 


Balance -670 430 50


 


Intake:   


 


  IV   150


 


  Intake, IV Titration  860 





  Amount   


 


    Piperacillin-Tazobactam 3  100 





    .375 gm In Sodium   





    Chloride 0.9% 100 ml @ 25   





    mls/hr IVPB Q8HR [facility] Rx#   





    :[id number]   


 


    Sodium Chloride 0.9% 1,  260 





    000 ml @ 20 mls/hr IV .   





    Q24H [facility] Rx#:[id number]   


 


    Vancomycin 1,750 mg In  500 





    Sodium Chloride 0.9% 500   





    ml 500 ml @ 167 mls/hr   





    IVPB Q16H [facility] Rx#:   





    [id number]   


 


  Oral  120 


 


Output:   


 


  Chest Tube Drainage 120  100


 


    left posterior chest 120  100


 


  Urine 550 550 


 


Other:   


 


  Voiding Method  Toilet 





  Urinal 


 


  # Voids  1 














- Constitutional


General appearance: Present: cooperative, no acute distress, obese





- Respiratory


Details: 





Lung sounds essentially clear throughout, diminished to his left lower lobe.  

Respirations are symmetrical and nonlabored.  Oxygen saturation is 94% on room 

air.  Achieving 2000 mL on his incentive spirometry.  Left pigtail catheter in 

place and connected to low continuous wall suction -20 cm H2O.  Intermittent air

leak is present.  345 mL output in the last 24 hours.





- Cardiovascular


Details: 





Regular rhythm and rate.  S1 and S2 present, negative for S3, gallop or murmur. 

No edema present. 





- Gastrointestinal


Gastrointestinal Comment(s): 





Abdomen soft, nontender nondistended.  Obese.  No guarding or rigidity.  Active 

bowel sounds present in all 4 abdominal quadrants.





- Genitourinary


Genitourinary Comment(s): 





Voiding clear yellow urine.





- Integumentary


Integumentary Comment(s): 





Skin is warm and dry.  No clubbing or cyanosis is present.  No rash or abnormal 

dictation is present.  Left chest pigtail catheter dressing is clean, dry and 

intact.





- Neurologic


Neurologic: Present: CNII-XII intact





- Musculoskeletal


Musculoskeletal: Present: gait normal, strength equal bilaterally





- Psychiatric


Psychiatric: Present: A&O x's 3, appropriate affect, intact judgment & insight





- Allied health notes


Allied health notes reviewed: nursing





- Labs


CBC & Chem 7: 


                                 08/26/19 05:36





                                 08/26/19 05:36


Labs: 


                  Abnormal Lab Results - Last 24 Hours (Table)











  08/25/19 08/25/19 08/26/19 Range/Units





  16:35 20:18 05:36 


 


WBC    17.2 H  (3.8-10.6)  k/uL


 


Neutrophils #    15.8 H  (1.3-7.7)  k/uL


 


Lymphocytes #    0.7 L  (1.0-4.8)  k/uL


 


BUN     (9-20)  mg/dL


 


Glucose     (74-99)  mg/dL


 


POC Glucose (mg/dL)  130 H  124 H   (75-99)  mg/dL














  08/26/19 08/26/19 Range/Units





  05:36 06:55 


 


WBC    (3.8-10.6)  k/uL


 


Neutrophils #    (1.3-7.7)  k/uL


 


Lymphocytes #    (1.0-4.8)  k/uL


 


BUN  32 H   (9-20)  mg/dL


 


Glucose  111 H   (74-99)  mg/dL


 


POC Glucose (mg/dL)   103 H  (75-99)  mg/dL








                      Microbiology - Last 24 Hours (Table)











 08/21/19 16:00 Anaerobic Culture - Final





 Pleural Fluid 


 


 08/21/19 16:00 Gram Stain - Final





 Pleural Fluid Body Fluid Culture - Final














- Imaging and Cardiology


Chest x-ray: report reviewed, image reviewed





Assessment and Plan


Assessment: 





1.  Left-sided pleural effusion with near complete collapse of the left lung, 

possibly malignant , status post left thoracentesis 2 L of fluid removed, status

post left chest pigtail catheter placement per interventional radiology


2.  Left-sided pneumothorax, status post thoracentesis, possible trapped lung


3.  History of CVA, no residual deficits


4.  History of diverticulitis and GI bleeding requiring low anterior resection 

October 18 2018.


5.  History of iron deficiency anemia secondary to GI blood losses


6.  History of masslike collection within the abdomen measuring 87 cm between 

the sigmoid and urinary bladder treated mostly with antibiotics.


7.  History of hypertension


8.  Hyperlipidemia


9.  Leukocytosis, unknown etiology


Plan: 





1.  Continue left pigtail catheter to low continuous wall suction -20 cm H2O.


2.  Patient is scheduled for a bronchoscopy procedure Today Monday, 08/26/2018 

to be performed by Dr. Delong.  Once the bronchoscopy has been completed further 

treatment plan will be discussed with the patient.


3.  Encourage activity as tolerated.  Out of bed for all meals.


4.  Pulmonary management recommendations per Dr. Delong. 


5.  Encourage use of his incentive spirometry every hour while awake.


6.  DVT and GI prophylaxis.


7.  More recommendations to follow based on patient's clinical course.


Time with Patient: Greater than 30

## 2019-08-26 NOTE — P.PN
Subjective


Progress Note Date: 08/26/19





Nilda More is a 78 yo M with PMH significant for CVA, HTN, HLD who presented 

to the ED at the request of his PCP after a large L pleural effusion was noted 

on an outpatient imaging study. He went for a CT to evaluate suspected 

nephrolithiasis, CT did show 3 mm non-obstructing stone but incidentally noted 

pleural effusion. This was then evaluated more fully with a chest CT which 

showed almost complete collapse of the L lung with only the apex aerated. Pt 

denies chest pain, shortness of breath, pain with inspiration. He does feel that

he had been getting dyspneic with exertion more recently and had noticed a 

cough. No fever, chills, night sweats or recent weight loss. He is a former 

smoker. In the ED, CXR again confirmed large L effusion and pulmonology was 

consulted and performed thoracentesis with 2000 ml greenish fluid removed.





8/26. Pt with pleurex catheter in place, he denies chest pain or shortness of 

breath. Pleural fluid culture remains negative for malignant cells and culture 

negative. Pt for bronchoscopy today.





Objective





- Vital Signs


Vital signs: 


                                   Vital Signs











Temp  97.9 F   08/26/19 19:07


 


Pulse  67   08/26/19 19:07


 


Resp  18   08/26/19 19:07


 


BP  129/72   08/26/19 19:07


 


Pulse Ox  98   08/26/19 19:07








                                 Intake & Output











 08/26/19 08/26/19 08/27/19





 06:59 18:59 06:59


 


Intake Total 980 250 120


 


Output Total 550 225 


 


Balance 430 25 120


 


Intake:   


 


  IV  250 


 


    Piperacillin-Tazobactam 3  100 





    .375 gm In Sodium   





    Chloride 0.9% 100 ml @ 25   





    mls/hr IVPB Q8HR [facility] Rx#   





    :[id number]   


 


  Intake, IV Titration 860  





  Amount   


 


    Piperacillin-Tazobactam 3 100  





    .375 gm In Sodium   





    Chloride 0.9% 100 ml @ 25   





    mls/hr IVPB Q8HR [facility] Rx#   





    :[id number]   


 


    Sodium Chloride 0.9% 1, 260  





    000 ml @ 20 mls/hr IV .   





    Q24H [facility] Rx#:[id number]   


 


    Vancomycin 1,750 mg In 500  





    Sodium Chloride 0.9% 500   





    ml 500 ml @ 167 mls/hr   





    IVPB Q16H [facility] Rx#:   





    [id number]   


 


  Oral 120  120


 


Output:   


 


  Chest Tube Drainage  225 


 


    left posterior chest  225 


 


  Urine 550  


 


Other:   


 


  Voiding Method Toilet  





 Urinal  


 


  # Voids 1  














- Exam





Gen: well developed, well nourished, NAD. Vitals reviewed


CV: RRR, no murmur, pulses 2+


Lungs: improved aeration, diminished breath sounds at L base, normal effort


Ext: no edema


Skin: warm and dry





- Labs


CBC & Chem 7: 


                                 08/26/19 05:36





                                 08/26/19 05:36


Labs: 


                  Abnormal Lab Results - Last 24 Hours (Table)











  08/26/19 08/26/19 08/26/19 Range/Units





  05:36 05:36 06:55 


 


WBC  17.2 H    (3.8-10.6)  k/uL


 


Neutrophils #  15.8 H    (1.3-7.7)  k/uL


 


Lymphocytes #  0.7 L    (1.0-4.8)  k/uL


 


BUN   32 H   (9-20)  mg/dL


 


Glucose   111 H   (74-99)  mg/dL


 


POC Glucose (mg/dL)    103 H  (75-99)  mg/dL








                      Microbiology - Last 24 Hours (Table)











 08/26/19 12:10 Bronchial Washings Culture - Preliminary





 Bronchial Washings - Right 


 


 08/26/19 12:10 Acid Fast Bacilli Culture - Preliminary





 Bronchial Washings - Right 


 


 08/26/19 12:10 Fungal Culture - Preliminary





 Bronchial Washings - Right 


 


 08/21/19 16:00 Anaerobic Culture - Final





 Pleural Fluid 














Assessment and Plan


(1) Empyema of left pleural space


Current Visit: Yes   Status: Acute   Code(s): J86.9 - PYOTHORAX WITHOUT FISTULA 

 SNOMED Code(s): 99960587


   





(2) Pleural effusion on left


Current Visit: Yes   Status: Acute   Code(s): J90 - PLEURAL EFFUSION, NOT 

ELSEWHERE CLASSIFIED   SNOMED Code(s): 79425377


   





(3) HTN (hypertension)


Current Visit: Yes   Status: Acute   Code(s): I10 - ESSENTIAL (PRIMARY) 

HYPERTENSION   SNOMED Code(s): 73089098


   





(4) Type 2 diabetes mellitus


Current Visit: Yes   Status: Acute   Code(s): E11.9 - TYPE 2 DIABETES MELLITUS 

WITHOUT COMPLICATIONS   SNOMED Code(s): 03012690


   


Plan: 





1. L pleural effusion. Pulmonology and CT surgery following. Cytology negative. 

Continue empiric coverage for empyema


2. LML consolidation. Further eval per Pulm and CT via bronchoscopy today


3. T2DM. Accucheck, sliding scale


4. HTN, continue toprol

## 2019-08-27 LAB
ALBUMIN SERPL-MCNC: 2.8 G/DL (ref 3.5–5)
ALP SERPL-CCNC: 156 U/L (ref 38–126)
ALT SERPL-CCNC: 72 U/L (ref 21–72)
ANION GAP SERPL CALC-SCNC: 4 MMOL/L
AST SERPL-CCNC: 39 U/L (ref 17–59)
BASOPHILS # BLD AUTO: 0.1 K/UL (ref 0–0.2)
BASOPHILS NFR BLD AUTO: 0 %
BUN SERPL-SCNC: 32 MG/DL (ref 9–20)
CALCIUM SPEC-MCNC: 8 MG/DL (ref 8.4–10.2)
CHLORIDE SERPL-SCNC: 110 MMOL/L (ref 98–107)
CO2 SERPL-SCNC: 28 MMOL/L (ref 22–30)
EOSINOPHIL # BLD AUTO: 0.1 K/UL (ref 0–0.7)
EOSINOPHIL NFR BLD AUTO: 1 %
ERYTHROCYTE [DISTWIDTH] IN BLOOD BY AUTOMATED COUNT: 4.76 M/UL (ref 4.3–5.9)
ERYTHROCYTE [DISTWIDTH] IN BLOOD: 14.6 % (ref 11.5–15.5)
GLUCOSE SERPL-MCNC: 75 MG/DL (ref 74–99)
HCT VFR BLD AUTO: 43.5 % (ref 39–53)
HGB BLD-MCNC: 13.9 GM/DL (ref 13–17.5)
LYMPHOCYTES # SPEC AUTO: 1 K/UL (ref 1–4.8)
LYMPHOCYTES NFR SPEC AUTO: 7 %
MCH RBC QN AUTO: 29.1 PG (ref 25–35)
MCHC RBC AUTO-ENTMCNC: 31.8 G/DL (ref 31–37)
MCV RBC AUTO: 91.5 FL (ref 80–100)
MONOCYTES # BLD AUTO: 1.2 K/UL (ref 0–1)
MONOCYTES NFR BLD AUTO: 9 %
NEUTROPHILS # BLD AUTO: 11.2 K/UL (ref 1.3–7.7)
NEUTROPHILS NFR BLD AUTO: 82 %
PLATELET # BLD AUTO: 266 K/UL (ref 150–450)
POTASSIUM SERPL-SCNC: 4.1 MMOL/L (ref 3.5–5.1)
PROT SERPL-MCNC: 5.9 G/DL (ref 6.3–8.2)
SODIUM SERPL-SCNC: 142 MMOL/L (ref 137–145)
WBC # BLD AUTO: 13.8 K/UL (ref 3.8–10.6)

## 2019-08-27 RX ADMIN — ACETAMINOPHEN PRN MG: 325 TABLET, FILM COATED ORAL at 21:08

## 2019-08-27 RX ADMIN — CEFAZOLIN SCH: 330 INJECTION, POWDER, FOR SOLUTION INTRAMUSCULAR; INTRAVENOUS at 22:38

## 2019-08-27 RX ADMIN — PIPERACILLIN AND TAZOBACTAM SCH MLS/HR: 3; .375 INJECTION, POWDER, FOR SOLUTION INTRAVENOUS at 08:24

## 2019-08-27 RX ADMIN — SODIUM CHLORIDE SCH MLS/HR: 9 INJECTION, SOLUTION INTRAVENOUS at 14:11

## 2019-08-27 RX ADMIN — METOPROLOL SUCCINATE SCH MG: 50 TABLET, EXTENDED RELEASE ORAL at 08:23

## 2019-08-27 RX ADMIN — PIPERACILLIN AND TAZOBACTAM SCH MLS/HR: 3; .375 INJECTION, POWDER, FOR SOLUTION INTRAVENOUS at 00:09

## 2019-08-27 RX ADMIN — PIPERACILLIN AND TAZOBACTAM SCH MLS/HR: 3; .375 INJECTION, POWDER, FOR SOLUTION INTRAVENOUS at 16:12

## 2019-08-27 RX ADMIN — ACETAMINOPHEN PRN MG: 325 TABLET, FILM COATED ORAL at 14:17

## 2019-08-27 RX ADMIN — CEFAZOLIN SCH MLS/HR: 330 INJECTION, POWDER, FOR SOLUTION INTRAMUSCULAR; INTRAVENOUS at 22:37

## 2019-08-27 NOTE — P.PN
Subjective


Progress Note Date: 08/27/19





Nilda More is a 80 yo M with PMH significant for CVA, HTN, HLD who presented 

to the ED at the request of his PCP after a large L pleural effusion was noted 

on an outpatient imaging study. He went for a CT to evaluate suspected 

nephrolithiasis, CT did show 3 mm non-obstructing stone but incidentally noted 

pleural effusion. This was then evaluated more fully with a chest CT which 

showed almost complete collapse of the L lung with only the apex aerated. Pt 

denies chest pain, shortness of breath, pain with inspiration. He does feel that

he had been getting dyspneic with exertion more recently and had noticed a 

cough. No fever, chills, night sweats or recent weight loss. He is a former 

smoker. In the ED, CXR again confirmed large L effusion and pulmonology was 

consulted and performed thoracentesis with 2000 ml greenish fluid removed.





8/27. Pt with pleurex catheter in place, he denies chest pain or shortness of 

breath. Pleural fluid culture remains negative for malignant cells and culture 

negative. He is s/p bronchoscopy which was unremarkable. WBC trending down and 

pt remains on abx. Pulmonology and CT surgery following.





Objective





- Vital Signs


Vital signs: 


                                   Vital Signs











Temp  97.8 F   08/27/19 14:40


 


Pulse  63   08/27/19 14:40


 


Resp  16   08/27/19 14:40


 


BP  144/73   08/27/19 14:40


 


Pulse Ox  97   08/27/19 14:40








                                 Intake & Output











 08/26/19 08/27/19 08/27/19





 18:59 06:59 18:59


 


Intake Total 250 840 100


 


Output Total 225 720 


 


Balance 25 120 100


 


Weight  110.1 kg 


 


Intake:   


 


   100 100


 


    Piperacillin-Tazobactam 3 100 100 100





    .375 gm In Sodium   





    Chloride 0.9% 100 ml @ 25   





    mls/hr IVPB Q8HR [facility] Rx#   





    :[id number]   


 


  Intake, IV Titration  500 





  Amount   


 


    Vancomycin 1,750 mg In  500 





    Sodium Chloride 0.9% 500   





    ml 500 ml @ 167 mls/hr   





    IVPB Q16H [facility] Rx#:   





    [id number]   


 


  Oral  240 


 


Output:   


 


  Chest Tube Drainage 225 170 


 


    left posterior chest 225 170 


 


  Urine  550 


 


Other:   


 


  Voiding Method  Toilet Toilet





  Urinal Urinal


 


  # Voids  1 














- Exam





Gen: well developed, well nourished, NAD. Vitals reviewed


CV: RRR, no murmur, pulses 2+


Lungs: improved aeration, diminished breath sounds at L base, normal effort


Ext: no edema


Skin: warm and dry





- Labs


CBC & Chem 7: 


                                 08/27/19 07:18





                                 08/27/19 07:18


Labs: 


                  Abnormal Lab Results - Last 24 Hours (Table)











  08/27/19 08/27/19 Range/Units





  07:18 07:18 


 


WBC  13.8 H   (3.8-10.6)  k/uL


 


Neutrophils #  11.2 H   (1.3-7.7)  k/uL


 


Monocytes #  1.2 H   (0-1.0)  k/uL


 


Chloride   110 H  ()  mmol/L


 


BUN   32 H  (9-20)  mg/dL


 


Calcium   8.0 L  (8.4-10.2)  mg/dL


 


Alkaline Phosphatase   156 H  ()  U/L


 


Total Protein   5.9 L  (6.3-8.2)  g/dL


 


Albumin   2.8 L  (3.5-5.0)  g/dL








                      Microbiology - Last 24 Hours (Table)











 08/26/19 12:10 Acid Fast Bacilli Smear - Final





 Bronchial Washings - Right Acid Fast Bacilli Culture - Preliminary


 


 08/26/19 12:10 Gram Stain - Preliminary





 Bronchial Washings - Right Bronchial Washings Culture - Preliminary


 


 08/26/19 12:10 Fungal Culture - Preliminary





 Bronchial Washings - Right 














Assessment and Plan


(1) Empyema of left pleural space


Current Visit: Yes   Status: Acute   Code(s): J86.9 - PYOTHORAX WITHOUT FISTULA 

 SNOMED Code(s): 94516301


   





(2) Pleural effusion on left


Current Visit: Yes   Status: Acute   Code(s): J90 - PLEURAL EFFUSION, NOT 

ELSEWHERE CLASSIFIED   SNOMED Code(s): 09049588


   





(3) HTN (hypertension)


Current Visit: Yes   Status: Acute   Code(s): I10 - ESSENTIAL (PRIMARY) 

HYPERTENSION   SNOMED Code(s): 95950901


   





(4) Type 2 diabetes mellitus


Current Visit: Yes   Status: Acute   Code(s): E11.9 - TYPE 2 DIABETES MELLITUS 

WITHOUT COMPLICATIONS   SNOMED Code(s): 88990701


   


Plan: 





1. L pleural effusion. Pulmonology and CT surgery following. Cytology negative. 

Continue empiric coverage for empyema.


2. LML consolidation. Bronchoscopy unremarkable


3. T2DM. Accucheck, sliding scale


4. HTN, continue toprol

## 2019-08-27 NOTE — P.PN
Subjective


Progress Note Date: 08/27/19


Principal diagnosis: 





Left-sided pleural effusion with near complete collapse of the left lung, 

possibly malignant, status post left thoracentesis with 2 L of fluid removed by 

pulmonary medicine, past medical history significant for hypertension, 

hyperlipidemia, previous CVA, history of GI bleed, history of diverticulitis 

status post low anterior resection, recent nephrolithiasis with a recent renal 

study showing a 3 mm nonobstructing calculus in the left lower pole of the left 

kidney





POD #5 ultrasound-guided chest tube insertion by interventional radiology.





Leukocytosis, unknown etiology





The patient is sitting up to the bedside chair.  He is in no acute distress.  

Left chest pigtail catheter remains in place to low continuous wall suction -20 

cm H2O.  Draining thin serosanguineous drainage.  Intermittent air leak is 

present.  400 mL output in the last 24 hours.  Pleural fluid cultures remain 

negative.  Oxygen saturations are 98% on room air.  He is achieving 2000 mL on 

his incentive spirometry.  He denies any complaints of pain or shortness of 

breath this time.  He underwent a bronchoscopy procedure performed by Dr. Delong 

yesterday.  Dr. Ramirez evaluated the patient this morning and is recommending 

alteplase 10 mg/100 mL normal saline pleural instillation today.  No surgical 

intervention is scheduled at this time.  The patient's WBC count is trending 

down and this morning is 13.8, BUN is 32, creatinine 1.04.





Objective





- Vital Signs


Vital signs: 


                                   Vital Signs











Temp  98.0 F   08/27/19 07:00


 


Pulse  57 L  08/27/19 08:00


 


Resp  16   08/27/19 08:00


 


BP  162/96   08/27/19 07:00


 


Pulse Ox  98   08/27/19 07:00








                                 Intake & Output











 08/26/19 08/27/19 08/27/19





 18:59 06:59 18:59


 


Intake Total 250 840 


 


Output Total 225 720 


 


Balance 25 120 


 


Weight  110.1 kg 


 


Intake:   


 


   100 


 


    Piperacillin-Tazobactam 3 100 100 





    .375 gm In Sodium   





    Chloride 0.9% 100 ml @ 25   





    mls/hr IVPB Q8HR [facility] Rx#   





    :[id number]   


 


  Intake, IV Titration  500 





  Amount   


 


    Vancomycin 1,750 mg In  500 





    Sodium Chloride 0.9% 500   





    ml 500 ml @ 167 mls/hr   





    IVPB Q16H [facility] Rx#:   





    [id number]   


 


  Oral  240 


 


Output:   


 


  Chest Tube Drainage 225 170 


 


    left posterior chest 225 170 


 


  Urine  550 


 


Other:   


 


  Voiding Method  Toilet Toilet





  Urinal Urinal


 


  # Voids  1 














- Constitutional


General appearance: Present: cooperative, no acute distress, obese





- Respiratory


Details: 





Lung sounds essentially clear throughout, diminished to his left lower lobe.  

Respirations are symmetrical and nonlabored.  No rhonchi, crackles or wheezes 

present.  Achieving 2000 mL on his incentive spirometry.  Left chest pigtail 

catheter in place to low continuous wall suction -20 cm H2O.  Draining thin 

serosanguineous drainage.  Intermittent air leak is present.  400 mL output in 

the last 24 hours.





- Cardiovascular


Details: 





Regular rhythm and rate.  S1 and S2 present, negative for S3, gallop or murmur. 

No edema present.





- Gastrointestinal


Gastrointestinal Comment(s): 





Abdomen is soft, nontender nondistended.  Active bowel sounds present all 4 

abdominal quadrants.  No guarding or rigidity.  No organomegaly appreciated.





- Genitourinary


Genitourinary Comment(s): 





Voiding clear jo urine.





- Integumentary


Integumentary Comment(s): 





Skin is warm and dry.  No clubbing or cyanosis is present.  No rash or abnormal 

pigmentation is present.  Left chest pigtail catheter dressing is clean, dry and

intact.





- Neurologic


Neurologic Comment(s): 





No focal deficits.


Neurologic: Present: CNII-XII intact





- Musculoskeletal


Musculoskeletal: Present: gait normal, strength equal bilaterally





- Psychiatric


Psychiatric: Present: A&O x's 3, appropriate affect, intact judgment & insight





- Allied health notes


Allied health notes reviewed: nursing





- Labs


CBC & Chem 7: 


                                 08/27/19 07:18





                                 08/27/19 07:18


Labs: 


                  Abnormal Lab Results - Last 24 Hours (Table)











  08/27/19 08/27/19 Range/Units





  07:18 07:18 


 


WBC  13.8 H   (3.8-10.6)  k/uL


 


Neutrophils #  11.2 H   (1.3-7.7)  k/uL


 


Monocytes #  1.2 H   (0-1.0)  k/uL


 


Chloride   110 H  ()  mmol/L


 


BUN   32 H  (9-20)  mg/dL


 


Calcium   8.0 L  (8.4-10.2)  mg/dL


 


Alkaline Phosphatase   156 H  ()  U/L


 


Total Protein   5.9 L  (6.3-8.2)  g/dL


 


Albumin   2.8 L  (3.5-5.0)  g/dL








                      Microbiology - Last 24 Hours (Table)











 08/26/19 12:10 Acid Fast Bacilli Smear - Final





 Bronchial Washings - Right Acid Fast Bacilli Culture - Preliminary


 


 08/26/19 12:10 Gram Stain - Preliminary





 Bronchial Washings - Right Bronchial Washings Culture - Preliminary


 


 08/26/19 12:10 Fungal Culture - Preliminary





 Bronchial Washings - Right 














- Imaging and Cardiology


Chest x-ray: report reviewed, image reviewed





Assessment and Plan


Assessment: 





1.  Left-sided pleural effusion with near complete collapse of the left lung, 

possibly malignant , status post left thoracentesis 2 L of fluid removed, status

post left chest pigtail catheter placement per interventional radiology


2.  Left-sided pneumothorax, status post thoracentesis, possible trapped lung


3.  History of CVA, no residual deficits


4.  History of diverticulitis and GI bleeding requiring low anterior resection 

October 18 2018.


5.  History of iron deficiency anemia secondary to GI blood losses


6.  History of masslike collection within the abdomen measuring 87 cm between 

the sigmoid and urinary bladder treated mostly with antibiotics.


7.  History of hypertension


8.  Hyperlipidemia


9.  Leukocytosis, unknown etiology


Plan: 





1.  Place left pigtail catheter to waterseal.


2.  Alteplase 10 mg/100 mL of 0.9% normal saline instilled to his left chest 

pigtail catheter today at 1 PM.  His chest tube will remain clamped for 1 hour. 

After 1 hour we will unclamp the chest tube and keep the chest tube to 

waterseal.


3.  Encourage activity as tolerated.  Out of bed for all meals.


4.  Pulmonary management recommendations per Dr. Delong. 


5.  Encourage use of his incentive spirometry every hour while awake.


6.  DVT and GI prophylaxis.


7.  No surgical intervention is warranted at this time and this was discussed 

with the patient by Dr. Ramirez.


8.  More recommendations to follow based on patient's clinical course.


Time with Patient: Greater than 30

## 2019-08-27 NOTE — XR
EXAMINATION TYPE: XR chest 1V portable

 

DATE OF EXAM: 8/27/2019

 

COMPARISON: 8/26/2019

 

HISTORY: Left pneumothorax

 

TECHNIQUE: Single frontal view of the chest is obtained.

 

FINDINGS:  Loculated lateral left basilar pneumothorax remains present with lateral opacity. Right haven
ng remains clear. Background chronic emphysematous change. Cardiac silhouette size is stable and mild
ly enlarged. No mediastinal shift is present. Osseous structures are demineralized. Retrocardiac dens
ity on the left is stable. May represent consolidated lung neoplasm. Arthropathy shoulders. 

 

 

IMPRESSION:  Stable large left-sided pneumothorax unchanged in appearance.

## 2019-08-27 NOTE — P.PN
Subjective


Progress Note Date: 08/27/19


Principal diagnosis: 


Large left pleural effusion





This is a 79-year-old white male with history of previous CVA, hypertension, 

hyperlipidemia,previous history of GI bleeding,history of nephrolithiasis, and 

recent renal study showing 3 mm nonobstructing calculus in the left lower pole 

of the left kidney.during the study, the patient was noted to have left-sided 

pleural effusion, hence he was advised to have a CT of the chest.this was done 

today on outpatient basis, and there was a significant collapse of the left lung

with near complete filling of the left sided pleural space with massive pleural 

effusion.  The only area was 90 portion of the left apex that was noted to be 

aerated based on the CT of the chest.  Mass lesion could not be entirely ruled 

out.  Hence the patient was advised to go to the ER, chest x-ray confirmed what 

was seen on the CT of the chest again, and the patient was admitted.  I went 

down to see the patient in the ER, and I performed a left-sided thoracentesis, I

was able to drain about 2000 mL of greenish color pleural effusion, and follow-

up chest x-ray showed residual pleural effusion which I plan to evaluate by 

ultrasound in the morning, may need another thoracentesis.  Patient will be 

admitted, and the fluid that I drained from the left pleural space was sent for 

different diagnostic studies.patient was last admitted to the hospital in 

October of 2018,back then the patient had low anterior resection for 

diverticulitis, and for ongoing iron deficiency anemia.  Back then he had a 

masslike collection within the abdomen measuring 87 cm between the sigmoid and 

the urinary bladder treated with antibiotics and outpatient setting.pulmonary-

wise, the patient denies any cough, no fever, no chills, no hemoptysis, no 

weight loss, he has some shortness of breath on exertion, and left-sided 

pleuritic chest pain.  This has been going on for the last few days.no history 

of underlying malignancy.





The patient is seen today 08/22/2019 in follow-up on the observation unit.  He 

is currently awake and alert in no acute distress.  He is maintaining good O2 

saturations in the 90s on room air.  He is status post left-sided thoracentesis 

yesterday with 2000 mL of greenish colored pleural fluid removed.  Cultures and 

Gram stain are pending.  Fluid analysis reveals less than 4 glucose, 6300 

nucleated cells, 4 poly-nuclear WBCs, 92 mononuclear WBCs.  Fluid protein 6.6.  

.  Bilirubin pending. Exudative in nature.  No fever, no chills, no night

sweats.  He is currently on IV Solu-Medrol.  9 normal saline at 20 ML's.





On 08/23/2019 patient is seen in follow-up on the surgical floor.  Yesterday 

left-sided pigtail chest tube was inserted by interventional radiology, and was 

connected to Pleur-evac which was placed to suction, and patient has since put 

out additional 1650 ML of dark colored pleural fluid.  Cytology still pending, 

cultures remain negative thus far.  Patient denies any chest pain, denies any 

dyspnea, he states his breathing has much improved, he is currently remains on 

room air, with a pulse ox of 95%, hemodynamically stable, lung sounds reveal an 

air entry on the left, with diminished breath sounds at the left base.  We'll 

obtain incentive spirometer, patient will be encouraged to deep breathing cough,

remains on empiric antibiotics in the form of Zosyn, and IV steroids, today's 

labs have been reviewed showing leukocytosis, of 31.6, possibly steroid induced,

electrolytes are within normal limits, BUN 31 and creatinine is 0.96.





On 08/25/2019 patient seen in follow-up on medical surgical floor.  He is awake 

and alert, in no acute distress, room air pulse ox is 99%, denies any shortness 

of breath, denies any chest pain, afebrile.  There has been 210 mL of dark 

pleural fluid out of the left-sided pigtail chest tube catheter in the last 24 

hours, it is 12 suction, no evidence of air leak, today's chest x-ray has been 

reviewed showing stable fairly moderate to large sized left-sided pneumothorax 

at lateral basilar aspect.  Clinically patient is asymptomatic, fluid for 

cytology was negative for any cytologically malignant cells, pleural fluid 

cultures are negative, patient denies any cough or chest congestion, he has been

scheduled for bronchoscopy with BAL tomorrow on only 25 2019 with Dr. Delong. 





On 08/26/2019 patient seen in follow-up on medical surgical floor.  Denies any 

chest pain, or dyspnea, remains on room air, with a pulse ox of 94%, lung sounds

reveal diminished breath sounds over left lower lobe, clear on the right, 

afebrile, cytology of the pleural fluid did not show cytologically malignant 

cells, pleural fluid cultures remain negative to date.  Patient is scheduled for

bronchoscopy with BAL today with Dr. Delong, he is been nothing by mouth since 

midnight.  Vital signs have been stable.  Today's chest x-ray has been reviewed,

showing stable large sized left pneumothorax unchanged in appearance from 

yesterday.  Patient continues on empiric antibiotics form of Zosyn and 

vancomycin. 





On 08/27/2019 patient seen in follow-up on selective care unit.  He is awake and

alert, in no acute distress, he underwent bronchoscopy with BAL for examination 

of the airways, and there were no masses or tumors noted, there was no bleeding,

the mucosa was normal throughout, there was minimal secretions on the left side,

there were no abnormalities noted.  Patient denies any dyspnea, lung sounds 

yesterday of some slightly diminished breath sounds on the left, pigtail 

catheter still in place with topped Pleur-evac, and there has been 395 ML of 

dark thin pleural fluid, fluid cultures remain negative.  Cytology was negative.

 Case was discussed with CT surgery on the case, and it was decided to try a 

dose of alteplase.  There is no loculation, and the left lung is trapped, [facility]

TS/thoracotomy with decortication is being considered. CT surgery following, 

patient denies any complaints, he is working on incentive spirometer, achieving 

2000 on the today.  Denies any chest pain, denies any cough or congestion, he is

tolerating ambulation.  Remains on Zosyn and vancomycin, cultures remain 

negative, patient is afebrile.











Objective





- Vital Signs


Vital signs: 


                                   Vital Signs











Temp  98.0 F   08/27/19 07:00


 


Pulse  57 L  08/27/19 08:00


 


Resp  16   08/27/19 08:00


 


BP  162/96   08/27/19 07:00


 


Pulse Ox  98   08/27/19 07:00








                                 Intake & Output











 08/26/19 08/27/19 08/27/19





 18:59 06:59 18:59


 


Intake Total 250 840 


 


Output Total 225 720 


 


Balance 25 120 


 


Weight  110.1 kg 


 


Intake:   


 


   100 


 


    Piperacillin-Tazobactam 3 100 100 





    .375 gm In Sodium   





    Chloride 0.9% 100 ml @ 25   





    mls/hr IVPB Q8HR [facility] Rx#   





    :[id number]   


 


  Intake, IV Titration  500 





  Amount   


 


    Vancomycin 1,750 mg In  500 





    Sodium Chloride 0.9% 500   





    ml 500 ml @ 167 mls/hr   





    IVPB Q16H [facility] Rx#:   





    [id number]   


 


  Oral  240 


 


Output:   


 


  Chest Tube Drainage 225 170 


 


    left posterior chest 225 170 


 


  Urine  550 


 


Other:   


 


  Voiding Method  Toilet Toilet





  Urinal Urinal


 


  # Voids  1 














- Exam


 GENERAL EXAM: Alert, pleasant, 79-year-old white male, on room air, in no acute

distress, comfortable in no apparent distress.


HEAD: Normocephalic/atraumatic.


EYES: Normal reaction of pupils, equal size.  Conjunctiva pink, sclera white.


NOSE: Clear with pink turbinates.


THROAT: No erythema or exudates.


NECK: No masses, no JVD, no thyroid enlargement, no adenopathy.


CHEST: No chest wall deformity.  Symmetrical expansion.  Left posterior pigtail 

chest tube catheter connected to a Pleur-evac with  dark pleural fluid, Pleur-

evac to wall suction, no evidence of air leak


LUNGS: Equal air entry with no crackles, wheeze, rhonchi or dullness.


CVS: Regular rate and rhythm, normal S1 and S2, no gallops, no murmurs, no rubs


ABDOMEN: Soft, nontender.  No hepatosplenomegaly, normal bowel sounds, no 

guarding or rigidity.


EXTREMITIES: No clubbing, no edema, no cyanosis, 2+ pulses and upper and lower 

extremities.


MUSCULOSKELETAL: Muscle strength and tone normal.


SPINE: No scoliosis or deformity


SKIN: No rashes


CENTRAL NERVOUS SYSTEM: Alert and oriented -3.  No focal deficits, tone is 

normal in all 4 extremities.


PSYCHIATRIC: Alert and oriented -3.  Appropriate affect.  Intact judgment and 

insight.











- Labs


CBC & Chem 7: 


                                 08/27/19 07:18





                                 08/27/19 07:18


Labs: 


                  Abnormal Lab Results - Last 24 Hours (Table)











  08/27/19 08/27/19 Range/Units





  07:18 07:18 


 


WBC  13.8 H   (3.8-10.6)  k/uL


 


Neutrophils #  11.2 H   (1.3-7.7)  k/uL


 


Monocytes #  1.2 H   (0-1.0)  k/uL


 


Chloride   110 H  ()  mmol/L


 


BUN   32 H  (9-20)  mg/dL


 


Calcium   8.0 L  (8.4-10.2)  mg/dL


 


Alkaline Phosphatase   156 H  ()  U/L


 


Total Protein   5.9 L  (6.3-8.2)  g/dL


 


Albumin   2.8 L  (3.5-5.0)  g/dL








                      Microbiology - Last 24 Hours (Table)











 08/26/19 12:10 Acid Fast Bacilli Smear - Final





 Bronchial Washings - Right Acid Fast Bacilli Culture - Preliminary


 


 08/26/19 12:10 Gram Stain - Preliminary





 Bronchial Washings - Right Bronchial Washings Culture - Preliminary


 


 08/26/19 12:10 Fungal Culture - Preliminary





 Bronchial Washings - Right 














Assessment and Plan


Plan: 


 Assessment:





1 Left-sided pleural effusion with near complete collapse of the left lung, 

possibly malignant , status post left thoracentesis 2 L of fluid removed, and 

the fluid was sent for different diagnostic studies.  Patient is status post 

thoracentesis would removal of 2 L of pleural fluid, with additional fluid rem

aining in the left pleural space, status post left chest pigtail chest tube, no 

cytologically malignant cells on the pleural fluid cytology, cultures are 

negative thus far.





2 left-sided pneumothorax, status post thoracentesis, trapped lung





3 previous history of CVA, presently asymptomatic.





4 history of diverticulitis and GI bleeding requiring low anterior resection 

October 18 2018.





5 history of iron deficiency anemia secondary to GI blood losses





6 history of masslike collection within the abdomen measuring 87 cm between the

sigmoid and urinary bladder treated mostly with antibiotics.





7 benign essential hypertension





8 hyperlipidemia





Plan:





Continue current antibiotics, will await the results of the final cultures, so 

far no growth seen on the Gram stain.  Cytology of the pleural fluid was 

negative, bronchoscopy was done yesterday, showing no abnormality of the 

airways.  Case was discussed with CT surgery, and the dose of alteplase will be 

instilled in the left pleural space.  Patient is still being considered for 

VATS/thoracotomy with decortication, CT surgery is following.





I performed a history & physical examination of the patient and discussed their 

management with my nurse practitioner, Lidia Lassiter.  I reviewed the nurse 

practitioner's note and agree with the documented findings and plan of care.  

Lung sounds are positive for diminished breath sounds.  The findings and the 

impression was discussed with the patient.  I attest to the documentation by the

nurse practitioner. 





Time with Patient: Less than 30

## 2019-08-28 LAB
ANION GAP SERPL CALC-SCNC: 4 MMOL/L
BASOPHILS # BLD AUTO: 0 K/UL (ref 0–0.2)
BASOPHILS NFR BLD AUTO: 0 %
BUN SERPL-SCNC: 30 MG/DL (ref 9–20)
CALCIUM SPEC-MCNC: 8.2 MG/DL (ref 8.4–10.2)
CHLORIDE SERPL-SCNC: 109 MMOL/L (ref 98–107)
CO2 SERPL-SCNC: 28 MMOL/L (ref 22–30)
EOSINOPHIL # BLD AUTO: 0.1 K/UL (ref 0–0.7)
EOSINOPHIL NFR BLD AUTO: 0 %
ERYTHROCYTE [DISTWIDTH] IN BLOOD BY AUTOMATED COUNT: 4.82 M/UL (ref 4.3–5.9)
ERYTHROCYTE [DISTWIDTH] IN BLOOD: 14.4 % (ref 11.5–15.5)
GLUCOSE SERPL-MCNC: 90 MG/DL (ref 74–99)
HCT VFR BLD AUTO: 44.4 % (ref 39–53)
HGB BLD-MCNC: 14.1 GM/DL (ref 13–17.5)
LYMPHOCYTES # SPEC AUTO: 0.8 K/UL (ref 1–4.8)
LYMPHOCYTES NFR SPEC AUTO: 5 %
MCH RBC QN AUTO: 29.3 PG (ref 25–35)
MCHC RBC AUTO-ENTMCNC: 31.8 G/DL (ref 31–37)
MCV RBC AUTO: 92.1 FL (ref 80–100)
MONOCYTES # BLD AUTO: 1.4 K/UL (ref 0–1)
MONOCYTES NFR BLD AUTO: 9 %
NEUTROPHILS # BLD AUTO: 12.8 K/UL (ref 1.3–7.7)
NEUTROPHILS NFR BLD AUTO: 84 %
PLATELET # BLD AUTO: 208 K/UL (ref 150–450)
POTASSIUM SERPL-SCNC: 3.8 MMOL/L (ref 3.5–5.1)
SODIUM SERPL-SCNC: 141 MMOL/L (ref 137–145)
WBC # BLD AUTO: 15.3 K/UL (ref 3.8–10.6)

## 2019-08-28 RX ADMIN — PIPERACILLIN AND TAZOBACTAM SCH MLS/HR: 3; .375 INJECTION, POWDER, FOR SOLUTION INTRAVENOUS at 00:23

## 2019-08-28 RX ADMIN — CEFAZOLIN SCH: 330 INJECTION, POWDER, FOR SOLUTION INTRAMUSCULAR; INTRAVENOUS at 15:28

## 2019-08-28 RX ADMIN — PIPERACILLIN AND TAZOBACTAM SCH MLS/HR: 3; .375 INJECTION, POWDER, FOR SOLUTION INTRAVENOUS at 11:35

## 2019-08-28 RX ADMIN — PIPERACILLIN AND TAZOBACTAM SCH MLS/HR: 3; .375 INJECTION, POWDER, FOR SOLUTION INTRAVENOUS at 20:36

## 2019-08-28 RX ADMIN — IBUPROFEN SCH MG: 400 TABLET, FILM COATED ORAL at 15:27

## 2019-08-28 RX ADMIN — PIPERACILLIN AND TAZOBACTAM SCH: 3; .375 INJECTION, POWDER, FOR SOLUTION INTRAVENOUS at 10:29

## 2019-08-28 RX ADMIN — IBUPROFEN SCH MG: 400 TABLET, FILM COATED ORAL at 20:36

## 2019-08-28 RX ADMIN — HYDROCODONE BITARTRATE AND ACETAMINOPHEN PRN EACH: 5; 325 TABLET ORAL at 11:40

## 2019-08-28 RX ADMIN — SODIUM CHLORIDE SCH MLS/HR: 9 INJECTION, SOLUTION INTRAVENOUS at 21:41

## 2019-08-28 RX ADMIN — ACETAMINOPHEN PRN MG: 325 TABLET, FILM COATED ORAL at 06:11

## 2019-08-28 RX ADMIN — METOPROLOL SUCCINATE SCH MG: 50 TABLET, EXTENDED RELEASE ORAL at 08:38

## 2019-08-28 RX ADMIN — SODIUM CHLORIDE SCH MLS/HR: 9 INJECTION, SOLUTION INTRAVENOUS at 06:11

## 2019-08-28 NOTE — P.PN
Subjective


Progress Note Date: 08/28/19


Principal diagnosis: 





Left-sided pleural effusion with near complete collapse of the left lung, 

possibly malignant, status post left thoracentesis with 2 L of fluid removed by 

pulmonary medicine, past medical history significant for hypertension, 

hyperlipidemia, previous CVA, history of GI bleed, history of diverticulitis 

status post low anterior resection, recent nephrolithiasis with a recent renal 

study showing a 3 mm nonobstructing calculus in the left lower pole of the left 

kidney





POD #6 ultrasound-guided chest tube insertion by interventional radiology.





Leukocytosis, unknown etiology





The patient is sitting up to the bedside edge.  He is in no acute distress.  He 

is complaining of some lower back discomfort, aching this morning.  Denies any 

complaints of shortness of breath.  Left chest pigtail catheter remains and is 

to water seal.  Intermittent air leak is present.  400 mL output in the last 24 

hours.  Pleural fluid cultures remain negative.  The patient received an all to 

place pleural instillation yesterday 10 mg in 100 mL of 0.9% normal saline.  

Oxygen saturations are 96% on room air.  He is achieving 2000 mL on his 

incentive spirometry.  No surgical intervention is scheduled at this time. 





Objective





- Vital Signs


Vital signs: 


                                   Vital Signs











Temp  97.5 F L  08/28/19 07:00


 


Pulse  67   08/28/19 07:00


 


Resp  16   08/28/19 07:00


 


BP  124/82   08/28/19 07:00


 


Pulse Ox  96   08/28/19 07:00








                                 Intake & Output











 08/27/19 08/28/19 08/28/19





 18:59 06:59 18:59


 


Intake Total 100 660 


 


Output Total 350 1500 


 


Balance -250 -840 


 


Weight  109.9 kg 


 


Intake:   


 


   100 


 


    Piperacillin-Tazobactam 3 100 100 





    .375 gm In Sodium   





    Chloride 0.9% 100 ml @ 25   





    mls/hr IVPB Q8HR [facility] Rx#   





    [id number]   


 


  Intake, IV Titration  500 





  Amount   


 


    Vancomycin 1,750 mg In  500 





    Sodium Chloride 0.9% 500   





    ml 500 ml @ 167 mls/hr   





    IVPB Q16H [facility] Rx#:   





    [id number]   


 


  Oral  60 


 


Output:   


 


  Chest Tube Drainage  300 


 


    left posterior chest  300 


 


  Urine 350 1200 


 


Other:   


 


  Voiding Method Toilet Toilet 





 Urinal Urinal 


 


  # Voids  1 














- Constitutional


General appearance: Present: cooperative, no acute distress, obese





- Respiratory


Details: 





Lungs sounds essentially clear throughout, diminished to his bilateral bases lef

t greater than right.  Respirations are symmetrical and nonlabored.  Oxygen 

saturation is 96% on room air.  Achieving 2000 mL on his incentive spirometry.  

Left pleural pigtail catheter remains in place to water seal.  Intermittent air 

leak is present.  Draining thin serosanguineous drainage with 400 mL output in 

the last 24 hours.





- Cardiovascular


Details: 





Regular rhythm and rate.  S1 and S2 present, negative for S3, gallop or murmur. 

No edema present.





- Gastrointestinal


Gastrointestinal Comment(s): 





Abdomen is soft, nontender and nondistended.  Active bowel sounds present all 4 

abdominal quadrants.  No guarding or rigidity.  No organomegaly appreciated.





- Genitourinary


Genitourinary Comment(s): 





Voiding clear yellow urine.





- Integumentary


Integumentary Comment(s): 





Skin is warm and dry.  No clubbing or cyanosis is present.  No rash or abnormal 

pigmentation is present.  Left chest pigtail catheter dressing clean, dry and in

tact.





- Neurologic


Neurologic Comment(s): 





No focal deficits.


Neurologic: Present: CNII-XII intact





- Musculoskeletal


Musculoskeletal: Present: gait normal, generalized weakness, strength equal wil

aterally





- Psychiatric


Psychiatric: Present: A&O x's 3, appropriate affect, intact judgment & insight





- Allied health notes


Allied health notes reviewed: nursing





- Labs


CBC & Chem 7: 


                                 08/28/19 09:13





                                 08/28/19 09:13





- Imaging and Cardiology


Chest x-ray: report reviewed, image reviewed





Assessment and Plan


Assessment: 





1.  Left-sided pleural effusion with near complete collapse of the left lung, 

possibly malignant , status post left thoracentesis 2 L of fluid removed, status

post left chest pigtail catheter placement per interventional radiology


2.  Left-sided pneumothorax, status post thoracentesis, possible trapped lung


3.  History of CVA, no residual deficits


4.  History of diverticulitis and GI bleeding requiring low anterior resection 

October 18 2018.


5.  History of iron deficiency anemia secondary to GI blood losses


6.  History of masslike collection within the abdomen measuring 87 cm between 

the sigmoid and urinary bladder treated mostly with antibiotics.


7.  History of hypertension


8.  Hyperlipidemia


9.  Leukocytosis, unknown etiology


Plan: 





1.  Place left pigtail catheter to waterseal.


2.  We will instill another dose of Alteplase 10 mg/100 mL of 0.9% normal saline

through his left chest pigtail catheter.  We will keep the pigtail catheter 

clamped for 1 hour post instillation.


3.  Encourage activity as tolerated.  Out of bed for all meals.


4.  Pulmonary management recommendations per Dr. Delong. 


5.  Encourage use of his incentive spirometry every hour while awake.


6.  DVT and GI prophylaxis.


7.  No surgical intervention is warranted at this time and this was discussed 

with the patient by Dr. Ramirez.


8.  More recommendations to follow based on patient's clinical course.


Time with Patient: Greater than 30

## 2019-08-28 NOTE — P.PN
Subjective


Progress Note Date: 08/28/19


Principal diagnosis: 


Large left pleural effusion





This is a 79-year-old white male with history of previous CVA, hypertension, 

hyperlipidemia,previous history of GI bleeding,history of nephrolithiasis, and 

recent renal study showing 3 mm nonobstructing calculus in the left lower pole 

of the left kidney.during the study, the patient was noted to have left-sided 

pleural effusion, hence he was advised to have a CT of the chest.this was done 

today on outpatient basis, and there was a significant collapse of the left lung

with near complete filling of the left sided pleural space with massive pleural 

effusion.  The only area was 90 portion of the left apex that was noted to be 

aerated based on the CT of the chest.  Mass lesion could not be entirely ruled 

out.  Hence the patient was advised to go to the ER, chest x-ray confirmed what 

was seen on the CT of the chest again, and the patient was admitted.  I went 

down to see the patient in the ER, and I performed a left-sided thoracentesis, I

was able to drain about 2000 mL of greenish color pleural effusion, and follow-

up chest x-ray showed residual pleural effusion which I plan to evaluate by 

ultrasound in the morning, may need another thoracentesis.  Patient will be 

admitted, and the fluid that I drained from the left pleural space was sent for 

different diagnostic studies.patient was last admitted to the hospital in 

October of 2018,back then the patient had low anterior resection for 

diverticulitis, and for ongoing iron deficiency anemia.  Back then he had a 

masslike collection within the abdomen measuring 87 cm between the sigmoid and 

the urinary bladder treated with antibiotics and outpatient setting.pulmonary-

wise, the patient denies any cough, no fever, no chills, no hemoptysis, no 

weight loss, he has some shortness of breath on exertion, and left-sided 

pleuritic chest pain.  This has been going on for the last few days.no history 

of underlying malignancy.





The patient is seen today 08/22/2019 in follow-up on the observation unit.  He 

is currently awake and alert in no acute distress.  He is maintaining good O2 

saturations in the 90s on room air.  He is status post left-sided thoracentesis 

yesterday with 2000 mL of greenish colored pleural fluid removed.  Cultures and 

Gram stain are pending.  Fluid analysis reveals less than 4 glucose, 6300 

nucleated cells, 4 poly-nuclear WBCs, 92 mononuclear WBCs.  Fluid protein 6.6.  

.  Bilirubin pending. Exudative in nature.  No fever, no chills, no night

sweats.  He is currently on IV Solu-Medrol.  9 normal saline at 20 ML's.





On 08/23/2019 patient is seen in follow-up on the surgical floor.  Yesterday 

left-sided pigtail chest tube was inserted by interventional radiology, and was 

connected to Pleur-evac which was placed to suction, and patient has since put 

out additional 1650 ML of dark colored pleural fluid.  Cytology still pending, 

cultures remain negative thus far.  Patient denies any chest pain, denies any 

dyspnea, he states his breathing has much improved, he is currently remains on 

room air, with a pulse ox of 95%, hemodynamically stable, lung sounds reveal an 

air entry on the left, with diminished breath sounds at the left base.  We'll 

obtain incentive spirometer, patient will be encouraged to deep breathing cough,

remains on empiric antibiotics in the form of Zosyn, and IV steroids, today's 

labs have been reviewed showing leukocytosis, of 31.6, possibly steroid induced,

electrolytes are within normal limits, BUN 31 and creatinine is 0.96.





On 08/25/2019 patient seen in follow-up on medical surgical floor.  He is awake 

and alert, in no acute distress, room air pulse ox is 99%, denies any shortness 

of breath, denies any chest pain, afebrile.  There has been 210 mL of dark 

pleural fluid out of the left-sided pigtail chest tube catheter in the last 24 

hours, it is 12 suction, no evidence of air leak, today's chest x-ray has been 

reviewed showing stable fairly moderate to large sized left-sided pneumothorax 

at lateral basilar aspect.  Clinically patient is asymptomatic, fluid for 

cytology was negative for any cytologically malignant cells, pleural fluid 

cultures are negative, patient denies any cough or chest congestion, he has been

scheduled for bronchoscopy with BAL tomorrow on only 25 2019 with Dr. Delong. 





On 08/26/2019 patient seen in follow-up on medical surgical floor.  Denies any 

chest pain, or dyspnea, remains on room air, with a pulse ox of 94%, lung sounds

reveal diminished breath sounds over left lower lobe, clear on the right, 

afebrile, cytology of the pleural fluid did not show cytologically malignant 

cells, pleural fluid cultures remain negative to date.  Patient is scheduled for

bronchoscopy with BAL today with Dr. Delong, he is been nothing by mouth since 

midnight.  Vital signs have been stable.  Today's chest x-ray has been reviewed,

showing stable large sized left pneumothorax unchanged in appearance from 

yesterday.  Patient continues on empiric antibiotics form of Zosyn and 

vancomycin. 





On 08/27/2019 patient seen in follow-up on selective care unit.  He is awake and

alert, in no acute distress, he underwent bronchoscopy with BAL for examination 

of the airways, and there were no masses or tumors noted, there was no bleeding,

the mucosa was normal throughout, there was minimal secretions on the left side,

there were no abnormalities noted.  Patient denies any dyspnea, lung sounds 

yesterday of some slightly diminished breath sounds on the left, pigtail 

catheter still in place with topped Pleur-evac, and there has been 395 ML of 

dark thin pleural fluid, fluid cultures remain negative.  Cytology was negative.

 Case was discussed with CT surgery on the case, and it was decided to try a 

dose of alteplase.  There is no loculation, and the left lung is trapped, [facility]

TS/thoracotomy with decortication is being considered. CT surgery following, 

patient denies any complaints, he is working on incentive spirometer, achieving 

2000 on the today.  Denies any chest pain, denies any cough or congestion, he is

tolerating ambulation.  Remains on Zosyn and vancomycin, cultures remain 

negative, patient is afebrile.





On 08/28/2019 patient seen in follow-up on medical surgical floor.  He is awake 

and alert, in no acute distress, he sitting up in the recliner, left-sided pi

gtail chest tube remains in place, there has been a total of 300 mL of dark 

pleural fluid drainage after the TPA infusion yesterday, follow-up chest x-ray 

shows stable large left-sided pneumothorax.  Clinically patient denies any 

distress, he stated he had some mild left-sided chest discomfort, relieved with 

pain medications, denies any discomfort today.  He is working on incentive 

spirometer, he is able to achieve 2025-9035 ML on it today, room air pulse ox is

96%.  Today's labs have been reviewed, white blood cell count is 15.3, 

hemoglobin is 14.1, electrolytes and renal profile relatively unremarkable.  

Bronchial wash cultures are negative thus far, bronchial washing cytology is 

negative











Objective





- Vital Signs


Vital signs: 


                                   Vital Signs











Temp  97.5 F L  08/28/19 07:00


 


Pulse  67   08/28/19 07:00


 


Resp  16   08/28/19 07:30


 


BP  124/82   08/28/19 07:00


 


Pulse Ox  96   08/28/19 07:00








                                 Intake & Output











 08/27/19 08/28/19 08/28/19





 18:59 06:59 18:59


 


Intake Total 100 660 


 


Output Total 350 1500 450


 


Balance -250 -840 -450


 


Weight  109.9 kg 


 


Intake:   


 


   100 


 


    Piperacillin-Tazobactam 3 100 100 





    .375 gm In Sodium   





    Chloride 0.9% 100 ml @ 25   





    mls/hr IVPB Q8HR [facility] Rx#   





    [id number]   


 


  Intake, IV Titration  500 





  Amount   


 


    Vancomycin 1,750 mg In  500 





    Sodium Chloride 0.9% 500   





    ml 500 ml @ 167 mls/hr   





    IVPB Q16H [facility] Rx#:   





    [id number]   


 


  Oral  60 


 


Output:   


 


  Chest Tube Drainage  300 


 


    left posterior chest  300 


 


  Urine 350 1200 450


 


Other:   


 


  Voiding Method Toilet Toilet Toilet





 Urinal Urinal Urinal


 


  # Voids  1 














- Exam


 GENERAL EXAM: Alert, pleasant, 79-year-old white male, on room air, in no acute

distress, comfortable in no apparent distress.


HEAD: Normocephalic/atraumatic.


EYES: Normal reaction of pupils, equal size.  Conjunctiva pink, sclera white.


NOSE: Clear with pink turbinates.


THROAT: No erythema or exudates.


NECK: No masses, no JVD, no thyroid enlargement, no adenopathy.


CHEST: No chest wall deformity.  Symmetrical expansion.  Left posterior pigtail 

chest tube catheter connected to a Pleur-evac with  dark pleural fluid, Pleur-

evac to wall suction, no evidence of air leak


LUNGS: Equal air entry with no crackles, wheeze, rhonchi or dullness.


CVS: Regular rate and rhythm, normal S1 and S2, no gallops, no murmurs, no rubs


ABDOMEN: Soft, nontender.  No hepatosplenomegaly, normal bowel sounds, no 

guarding or rigidity.


EXTREMITIES: No clubbing, no edema, no cyanosis, 2+ pulses and upper and lower 

extremities.


MUSCULOSKELETAL: Muscle strength and tone normal.


SPINE: No scoliosis or deformity


SKIN: No rashes


CENTRAL NERVOUS SYSTEM: Alert and oriented -3.  No focal deficits, tone is 

normal in all 4 extremities.


PSYCHIATRIC: Alert and oriented -3.  Appropriate affect.  Intact judgment and 

insight.











- Labs


CBC & Chem 7: 


                                 08/28/19 09:13





                                 08/28/19 09:13


Labs: 


                  Abnormal Lab Results - Last 24 Hours (Table)











  08/28/19 08/28/19 Range/Units





  09:13 09:13 


 


WBC  15.3 H   (3.8-10.6)  k/uL


 


Neutrophils #  12.8 H   (1.3-7.7)  k/uL


 


Lymphocytes #  0.8 L   (1.0-4.8)  k/uL


 


Monocytes #  1.4 H   (0-1.0)  k/uL


 


Chloride   109 H  ()  mmol/L


 


BUN   30 H  (9-20)  mg/dL


 


Calcium   8.2 L  (8.4-10.2)  mg/dL








                      Microbiology - Last 24 Hours (Table)











 08/26/19 12:10 Gram Stain - Final





 Bronchial Washings - Right Bronchial Washings Culture - Final














Assessment and Plan


Plan: 


 Assessment:





1 Left-sided pleural effusion with near complete collapse of the left lung, 

possibly malignant , status post left thoracentesis 2 L of fluid removed, and 

the fluid was sent for different diagnostic studies.  Patient is status post 

thoracentesis would removal of 2 L of pleural fluid, with additional fluid 

remaining in the left pleural space, status post left chest pigtail chest tube, 

no cytologically malignant cells on the pleural fluid cytology, cultures are 

negative thus far.





2 left-sided pneumothorax, status post thoracentesis, trapped lung





3 previous history of CVA, presently asymptomatic.





4 history of diverticulitis and GI bleeding requiring low anterior resection 

October 18 2018.





5 history of iron deficiency anemia secondary to GI blood losses





6 history of masslike collection within the abdomen measuring 87 cm between the

sigmoid and urinary bladder treated mostly with antibiotics.





7 benign essential hypertension





8 hyperlipidemia





Plan:





We'll continue to follow along, all cultures remain negative, bronchial wash 

cultures and cytology are negative.  Chest x-ray was stable findings of left-

sided pneumothorax, no significant drainage following the instillation of the 

alteplase in last 24 hours, CT surgery is planning on instilling another dose of

alteplase.  No plans for surgical intervention at this time.  We'll follow





I performed a history & physical examination of the patient and discussed their 

management with my nurse practitioner, Lidia Lassiter.  I reviewed the nurse 

practitioner's note and agree with the documented findings and plan of care.  

Lung sounds are positive for diminished breath sounds.  The findings and the 

impression was discussed with the patient.  I attest to the documentation by the

nurse practitioner. 





Time with Patient: Less than 30

## 2019-08-28 NOTE — XR
EXAMINATION TYPE: XR chest 1V portable

 

DATE OF EXAM: 8/28/2019

 

COMPARISON: 8/27/2019

 

HISTORY: Left-sided pneumothorax

 

TECHNIQUE: Single frontal view of the chest is obtained.

 

FINDINGS:  Loculated lateral left basilar pneumothorax remains present with lateral opacity. Right haven
ng remains clear. Background chronic emphysematous change. Cardiac silhouette size is stable and mild
ly enlarged. No mediastinal shift is present. Osseous structures are demineralized. Retrocardiac dens
ity on the left is stable. May represent consolidated lung neoplasm. Arthropathy shoulders. Pigtail c
atheter noted overlying the left lower hemithorax. Subcutaneous emphysema noted.

 

 

IMPRESSION:  Stable large left-sided pneumothorax.

## 2019-08-28 NOTE — P.PN
Subjective


Progress Note Date: 08/28/19





Nilda More is a 80 yo M with PMH significant for CVA, HTN, HLD who presented 

to the ED at the request of his PCP after a large L pleural effusion was noted 

on an outpatient imaging study. He went for a CT to evaluate suspected 

nephrolithiasis, CT did show 3 mm non-obstructing stone but incidentally noted 

pleural effusion. This was then evaluated more fully with a chest CT which 

showed almost complete collapse of the L lung with only the apex aerated. Pt 

denies chest pain, shortness of breath, pain with inspiration. He does feel that

he had been getting dyspneic with exertion more recently and had noticed a 

cough. No fever, chills, night sweats or recent weight loss. He is a former 

smoker. In the ED, CXR again confirmed large L effusion and pulmonology was 

consulted and performed thoracentesis with 2000 ml greenish fluid removed.





8/27. Pt with pleurex catheter in place, he denies chest pain or shortness of 

breath. Pleural fluid culture remains negative for malignant cells and culture 

negative. He is s/p bronchoscopy which was unremarkable. WBC trending down and 

pt remains on abx. Pulmonology and CT surgery following.





8/28. His pleurex catheter with continued scant drainage. CXR this am with 

stable L pneumothorax and LML consolidation. He is feeling well from a 

respiratory perspective but is complaining of more low back pain which he 

attributes to lying in the hospital bed.





Objective





- Vital Signs


Vital signs: 


                                   Vital Signs











Temp  97.5 F L  08/28/19 07:00


 


Pulse  67   08/28/19 07:00


 


Resp  16   08/28/19 07:00


 


BP  124/82   08/28/19 07:00


 


Pulse Ox  96   08/28/19 07:00








                                 Intake & Output











 08/27/19 08/28/19 08/28/19





 18:59 06:59 18:59


 


Intake Total 100 660 


 


Output Total 350 1500 


 


Balance -250 -840 


 


Weight  109.9 kg 


 


Intake:   


 


   100 


 


    Piperacillin-Tazobactam 3 100 100 





    .375 gm In Sodium   





    Chloride 0.9% 100 ml @ 25   





    mls/hr IVPB Q8HR [facility] Rx#   





    [id number]   


 


  Intake, IV Titration  500 





  Amount   


 


    Vancomycin 1,750 mg In  500 





    Sodium Chloride 0.9% 500   





    ml 500 ml @ 167 mls/hr   





    IVPB Q16H [facility] Rx#:   





    [id number]   


 


  Oral  60 


 


Output:   


 


  Chest Tube Drainage  300 


 


    left posterior chest  300 


 


  Urine 350 1200 


 


Other:   


 


  Voiding Method Toilet Toilet 





 Urinal Urinal 


 


  # Voids  1 














- Exam





Gen: well developed, well nourished, NAD. Vitals reviewed


CV: RRR, no murmur, pulses 2+


Lungs: improved aeration, diminished breath sounds at L base, normal effort


Ext: no edema. Spasm and tenderness to lumbar paraspinal muscles


Skin: warm and dry





- Labs


CBC & Chem 7: 


                                 08/27/19 07:18





                                 08/27/19 07:18





Assessment and Plan


(1) Empyema of left pleural space


Current Visit: Yes   Status: Acute   Code(s): J86.9 - PYOTHORAX WITHOUT FISTULA 

 SNOMED Code(s): 84315925


   





(2) Pleural effusion on left


Current Visit: Yes   Status: Acute   Code(s): J90 - PLEURAL EFFUSION, NOT 

ELSEWHERE CLASSIFIED   SNOMED Code(s): 67676896


   





(3) HTN (hypertension)


Current Visit: Yes   Status: Acute   Code(s): I10 - ESSENTIAL (PRIMARY) 

HYPERTENSION   SNOMED Code(s): 77557421


   





(4) Type 2 diabetes mellitus


Current Visit: Yes   Status: Acute   Code(s): E11.9 - TYPE 2 DIABETES MELLITUS 

WITHOUT COMPLICATIONS   SNOMED Code(s): 93869193


   





(5) Acute low back pain


Current Visit: Yes   Status: Acute   Code(s): M54.5 - LOW BACK PAIN   SNOMED 

Code(s): 765364171


   


Plan: 





1. L pleural effusion. Pulmonology and CT surgery following. Cytology negative. 

Continue empiric coverage for empyema.


2. LML consolidation. Bronchoscopy unremarkable. Further workup per Pulm and CT 

surgery


3. Low back pain. Secondary to immobilization. Ambulate. Ibuprofen and norco prn


3. T2DM. Accucheck, sliding scale


4. HTN, continue toprol

## 2019-08-29 LAB
ANION GAP SERPL CALC-SCNC: 9 MMOL/L
BUN SERPL-SCNC: 22 MG/DL (ref 9–20)
CALCIUM SPEC-MCNC: 8.4 MG/DL (ref 8.4–10.2)
CHLORIDE SERPL-SCNC: 116 MMOL/L (ref 98–107)
CO2 SERPL-SCNC: 15 MMOL/L (ref 22–30)
ERYTHROCYTE [DISTWIDTH] IN BLOOD BY AUTOMATED COUNT: 4.78 M/UL (ref 4.3–5.9)
ERYTHROCYTE [DISTWIDTH] IN BLOOD: 15.5 % (ref 11.5–15.5)
GLUCOSE SERPL-MCNC: 82 MG/DL (ref 74–99)
HCT VFR BLD AUTO: 43.2 % (ref 39–53)
HGB BLD-MCNC: 14.6 GM/DL (ref 13–17.5)
MCH RBC QN AUTO: 30.6 PG (ref 25–35)
MCHC RBC AUTO-ENTMCNC: 33.8 G/DL (ref 31–37)
MCV RBC AUTO: 90.4 FL (ref 80–100)
PLATELET # BLD AUTO: 130 K/UL (ref 150–450)
POTASSIUM SERPL-SCNC: 4.6 MMOL/L (ref 3.5–5.1)
SODIUM SERPL-SCNC: 140 MMOL/L (ref 137–145)
WBC # BLD AUTO: 15.4 K/UL (ref 3.8–10.6)

## 2019-08-29 RX ADMIN — HYDROCODONE BITARTRATE AND ACETAMINOPHEN PRN EACH: 5; 325 TABLET ORAL at 19:12

## 2019-08-29 RX ADMIN — SODIUM CHLORIDE SCH MLS/HR: 9 INJECTION, SOLUTION INTRAVENOUS at 14:13

## 2019-08-29 RX ADMIN — HYDROCODONE BITARTRATE AND ACETAMINOPHEN PRN EACH: 5; 325 TABLET ORAL at 01:14

## 2019-08-29 RX ADMIN — IBUPROFEN SCH MG: 400 TABLET, FILM COATED ORAL at 21:03

## 2019-08-29 RX ADMIN — PIPERACILLIN AND TAZOBACTAM SCH MLS/HR: 3; .375 INJECTION, POWDER, FOR SOLUTION INTRAVENOUS at 05:00

## 2019-08-29 RX ADMIN — HYDROCODONE BITARTRATE AND ACETAMINOPHEN PRN EACH: 5; 325 TABLET ORAL at 07:28

## 2019-08-29 RX ADMIN — IBUPROFEN SCH MG: 400 TABLET, FILM COATED ORAL at 16:25

## 2019-08-29 RX ADMIN — IBUPROFEN SCH MG: 400 TABLET, FILM COATED ORAL at 07:27

## 2019-08-29 RX ADMIN — HYDROCODONE BITARTRATE AND ACETAMINOPHEN PRN EACH: 5; 325 TABLET ORAL at 12:28

## 2019-08-29 RX ADMIN — METOPROLOL SUCCINATE SCH MG: 50 TABLET, EXTENDED RELEASE ORAL at 07:28

## 2019-08-29 RX ADMIN — PIPERACILLIN AND TAZOBACTAM SCH MLS/HR: 3; .375 INJECTION, POWDER, FOR SOLUTION INTRAVENOUS at 12:29

## 2019-08-29 RX ADMIN — CEFAZOLIN SCH: 330 INJECTION, POWDER, FOR SOLUTION INTRAMUSCULAR; INTRAVENOUS at 15:17

## 2019-08-29 RX ADMIN — PIPERACILLIN AND TAZOBACTAM SCH MLS/HR: 3; .375 INJECTION, POWDER, FOR SOLUTION INTRAVENOUS at 21:02

## 2019-08-29 NOTE — P.PN
Subjective


Progress Note Date: 08/29/19


Principal diagnosis: 


Large left pleural effusion





This is a 79-year-old white male with history of previous CVA, hypertension, 

hyperlipidemia,previous history of GI bleeding,history of nephrolithiasis, and 

recent renal study showing 3 mm nonobstructing calculus in the left lower pole 

of the left kidney.during the study, the patient was noted to have left-sided 

pleural effusion, hence he was advised to have a CT of the chest.this was done 

today on outpatient basis, and there was a significant collapse of the left lung

with near complete filling of the left sided pleural space with massive pleural 

effusion.  The only area was 90 portion of the left apex that was noted to be 

aerated based on the CT of the chest.  Mass lesion could not be entirely ruled 

out.  Hence the patient was advised to go to the ER, chest x-ray confirmed what 

was seen on the CT of the chest again, and the patient was admitted.  I went 

down to see the patient in the ER, and I performed a left-sided thoracentesis, I

was able to drain about 2000 mL of greenish color pleural effusion, and follow-

up chest x-ray showed residual pleural effusion which I plan to evaluate by 

ultrasound in the morning, may need another thoracentesis.  Patient will be 

admitted, and the fluid that I drained from the left pleural space was sent for 

different diagnostic studies.patient was last admitted to the hospital in 

October of 2018,back then the patient had low anterior resection for 

diverticulitis, and for ongoing iron deficiency anemia.  Back then he had a 

masslike collection within the abdomen measuring 87 cm between the sigmoid and 

the urinary bladder treated with antibiotics and outpatient setting.pulmonary-

wise, the patient denies any cough, no fever, no chills, no hemoptysis, no 

weight loss, he has some shortness of breath on exertion, and left-sided 

pleuritic chest pain.  This has been going on for the last few days.no history 

of underlying malignancy.





The patient is seen today 08/22/2019 in follow-up on the observation unit.  He 

is currently awake and alert in no acute distress.  He is maintaining good O2 

saturations in the 90s on room air.  He is status post left-sided thoracentesis 

yesterday with 2000 mL of greenish colored pleural fluid removed.  Cultures and 

Gram stain are pending.  Fluid analysis reveals less than 4 glucose, 6300 

nucleated cells, 4 poly-nuclear WBCs, 92 mononuclear WBCs.  Fluid protein 6.6.  

.  Bilirubin pending. Exudative in nature.  No fever, no chills, no night

sweats.  He is currently on IV Solu-Medrol.  9 normal saline at 20 ML's.





On 08/23/2019 patient is seen in follow-up on the surgical floor.  Yesterday 

left-sided pigtail chest tube was inserted by interventional radiology, and was 

connected to Pleur-evac which was placed to suction, and patient has since put 

out additional 1650 ML of dark colored pleural fluid.  Cytology still pending, 

cultures remain negative thus far.  Patient denies any chest pain, denies any 

dyspnea, he states his breathing has much improved, he is currently remains on 

room air, with a pulse ox of 95%, hemodynamically stable, lung sounds reveal an 

air entry on the left, with diminished breath sounds at the left base.  We'll 

obtain incentive spirometer, patient will be encouraged to deep breathing cough,

remains on empiric antibiotics in the form of Zosyn, and IV steroids, today's 

labs have been reviewed showing leukocytosis, of 31.6, possibly steroid induced,

electrolytes are within normal limits, BUN 31 and creatinine is 0.96.





On 08/25/2019 patient seen in follow-up on medical surgical floor.  He is awake 

and alert, in no acute distress, room air pulse ox is 99%, denies any shortness 

of breath, denies any chest pain, afebrile.  There has been 210 mL of dark 

pleural fluid out of the left-sided pigtail chest tube catheter in the last 24 

hours, it is 12 suction, no evidence of air leak, today's chest x-ray has been 

reviewed showing stable fairly moderate to large sized left-sided pneumothorax 

at lateral basilar aspect.  Clinically patient is asymptomatic, fluid for 

cytology was negative for any cytologically malignant cells, pleural fluid 

cultures are negative, patient denies any cough or chest congestion, he has been

scheduled for bronchoscopy with BAL tomorrow on only 25 2019 with Dr. Delong. 





On 08/26/2019 patient seen in follow-up on medical surgical floor.  Denies any 

chest pain, or dyspnea, remains on room air, with a pulse ox of 94%, lung sounds

reveal diminished breath sounds over left lower lobe, clear on the right, 

afebrile, cytology of the pleural fluid did not show cytologically malignant 

cells, pleural fluid cultures remain negative to date.  Patient is scheduled for

bronchoscopy with BAL today with Dr. Delong, he is been nothing by mouth since 

midnight.  Vital signs have been stable.  Today's chest x-ray has been reviewed,

showing stable large sized left pneumothorax unchanged in appearance from 

yesterday.  Patient continues on empiric antibiotics form of Zosyn and 

vancomycin. 





On 08/27/2019 patient seen in follow-up on selective care unit.  He is awake and

alert, in no acute distress, he underwent bronchoscopy with BAL for examination 

of the airways, and there were no masses or tumors noted, there was no bleeding,

the mucosa was normal throughout, there was minimal secretions on the left side,

there were no abnormalities noted.  Patient denies any dyspnea, lung sounds 

yesterday of some slightly diminished breath sounds on the left, pigtail 

catheter still in place with topped Pleur-evac, and there has been 395 ML of 

dark thin pleural fluid, fluid cultures remain negative.  Cytology was negative.

 Case was discussed with CT surgery on the case, and it was decided to try a 

dose of alteplase.  There is no loculation, and the left lung is trapped, [facility]

TS/thoracotomy with decortication is being considered. CT surgery following, 

patient denies any complaints, he is working on incentive spirometer, achieving 

2000 on the today.  Denies any chest pain, denies any cough or congestion, he is

tolerating ambulation.  Remains on Zosyn and vancomycin, cultures remain 

negative, patient is afebrile.





On 08/28/2019 patient seen in follow-up on medical surgical floor.  He is awake 

and alert, in no acute distress, he sitting up in the recliner, left-sided pi

gtail chest tube remains in place, there has been a total of 300 mL of dark 

pleural fluid drainage after the TPA infusion yesterday, follow-up chest x-ray 

shows stable large left-sided pneumothorax.  Clinically patient denies any 

distress, he stated he had some mild left-sided chest discomfort, relieved with 

pain medications, denies any discomfort today.  He is working on incentive 

spirometer, he is able to achieve 8891-1650 ML on it today, room air pulse ox is

96%.  Today's labs have been reviewed, white blood cell count is 15.3, 

hemoglobin is 14.1, electrolytes and renal profile relatively unremarkable.  

Bronchial wash cultures are negative thus far, bronchial washing cytology is 

negative





On 08/29/2019 patient seen in follow-up on medical surgical floor.  He is been 

complaining of some back pain, but denies any respiratory distress, left-sided 

pigtail chest tube is in place, and there has been 450 mL of pleural fluid 

drainage which seems to be less dark, more serous in color.  The chest tube is 

to water seal now, no air leak, today's chest x-ray shows stable large left-

sided pneumothorax, and subcutaneous emphysema at the pigtail site insertion, 

left posterior lower lobe, lateral chest wall, and some subcutaneous emphysema 

on the left side of the neck











Objective





- Vital Signs


Vital signs: 


                                   Vital Signs











Temp  98.1 F   08/29/19 07:00


 


Pulse  81   08/29/19 07:00


 


Resp  15   08/29/19 07:00


 


BP  151/86   08/29/19 07:00


 


Pulse Ox  96   08/29/19 07:00








                                 Intake & Output











 08/28/19 08/29/19 08/29/19





 18:59 06:59 18:59


 


Intake Total  1550 


 


Output Total 450 410 


 


Balance -450 1140 


 


Weight  102 kg 


 


Intake:   


 


  Intake, IV Titration  800 





  Amount   


 


    Piperacillin-Tazobactam 3  100 





    .375 gm In Sodium   





    Chloride 0.9% 100 ml @ 25   





    mls/hr IVPB Q8H [facility] Rx#:   





    [id number]   


 


    Sodium Chloride 0.9% 1,  200 





    000 ml @ 20 mls/hr IV .   





    Q24H [facility] Rx#:[id number]   


 


    Vancomycin 1,750 mg In  500 





    Sodium Chloride 0.9% 500   





    ml 500 ml @ 167 mls/hr   





    IVPB Q16H [facility] Rx#:   





    [id number]   


 


  Oral  750 


 


Output:   


 


  Chest Tube Drainage  410 


 


    left posterior chest  410 


 


  Urine 450  


 


Other:   


 


  Voiding Method Toilet  





 Urinal  


 


  # Voids  2 














- Exam


 GENERAL EXAM: Alert, pleasant, 79-year-old white male, on room air, in no acute

distress, comfortable in no apparent distress.


HEAD: Normocephalic/atraumatic.


EYES: Normal reaction of pupils, equal size.  Conjunctiva pink, sclera white.


NOSE: Clear with pink turbinates.


THROAT: No erythema or exudates.


NECK: No masses, no JVD, no thyroid enlargement, no adenopathy.  Mild 

subcutaneous emphysema at the base of the left neck


CHEST: No chest wall deformity.  Symmetrical expansion.  Left posterior pigtail 

chest tube catheter connected to a Pleur-evac with  dark pleural fluid, Pleur-

evac to wall suction, no evidence of air leak.  Subcutaneous emphysema 

surrounding the left pigtail insertion site, left lateral lower chest wall


LUNGS: Equal air entry with crackles likely related to subcutaneous emphysema 

over left lower lobe wheeze, rhonchi or dullness.


CVS: Regular rate and rhythm, normal S1 and S2, no gallops, no murmurs, no rubs


ABDOMEN: Soft, nontender.  No hepatosplenomegaly, normal bowel sounds, no 

guarding or rigidity.


EXTREMITIES: No clubbing, no edema, no cyanosis, 2+ pulses and upper and lower 

extremities.


MUSCULOSKELETAL: Muscle strength and tone normal.


SPINE: No scoliosis or deformity


SKIN: No rashes


CENTRAL NERVOUS SYSTEM: Alert and oriented -3.  No focal deficits, tone is 

normal in all 4 extremities.


PSYCHIATRIC: Alert and oriented -3.  Appropriate affect.  Intact judgment and 

insight.











- Labs


CBC & Chem 7: 


                                 08/29/19 06:58





                                 08/29/19 06:58


Labs: 


                  Abnormal Lab Results - Last 24 Hours (Table)











  08/29/19 08/29/19 Range/Units





  06:58 06:58 


 


WBC  15.4 H   (3.8-10.6)  k/uL


 


Plt Count  130 L   (150-450)  k/uL


 


Chloride   116 H  ()  mmol/L


 


Carbon Dioxide   15 L  (22-30)  mmol/L


 


BUN   22 H  (9-20)  mg/dL








                      Microbiology - Last 24 Hours (Table)











 08/26/19 12:10 Gram Stain - Final





 Bronchial Washings - Right Bronchial Washings Culture - Final














Assessment and Plan


Plan: 


 Assessment:





1 Left-sided pleural effusion with near complete collapse of the left lung, 

possibly malignant , status post left thoracentesis 2 L of fluid removed, and 

the fluid was sent for different diagnostic studies.  Patient is status post 

thoracentesis would removal of 2 L of pleural fluid, with additional fluid 

remaining in the left pleural space, status post left chest pigtail chest tube, 

no cytologically malignant cells on the pleural fluid cytology, cultures are 

negative thus far.





2 left-sided pneumothorax, status post thoracentesis, trapped lung





3 previous history of CVA, presently asymptomatic.





4 history of diverticulitis and GI bleeding requiring low anterior resection 

October 18 2018.





5 history of iron deficiency anemia secondary to GI blood losses





6 history of masslike collection within the abdomen measuring 87 cm between the

sigmoid and urinary bladder treated mostly with antibiotics.





7 benign essential hypertension





8 hyperlipidemia





Plan:





Surgery is planned and on installation on the dose of multiple place, left 

pigtail chest tube continues to drain, no dyspnea, no signs of air leak, no 

chest pain, patient is complaining of back pain.  Today's chest x-ray shows 

stable findings of left-sided pneumothorax, related to trapped lung, and subc

utaneous emphysema in the left lower posterior and lateral chest wall and the 

left neck.  Clinically asymptomatic, continue encouraging deep breathing and 

coughing, encourage ambulation.  Pleural fluid cultures remain negative thus 

far. 





I performed a history & physical examination of the patient and discussed their 

management with my nurse practitioner, Lidia Lassiter.  I reviewed the nurse 

practitioner's note and agree with the documented findings and plan of care.  

Lung sounds are positive for diminished breath sounds.  The findings and the 

impression was discussed with the patient.  I attest to the documentation by the

nurse practitioner. 





Time with Patient: Less than 30

## 2019-08-29 NOTE — P.PN
Subjective


Progress Note Date: 08/29/19


Principal diagnosis: 





Left-sided pleural effusion with near complete collapse of the left lung, 

possibly malignant, status post left thoracentesis with 2 L of fluid removed by 

pulmonary medicine.  Previous medical history of hypertension, hyperlipidemia, 

previous CVA, GI bleed, diverticulitis status post low anterior resection, 

recent nephrolithiasis with renal study showing a 3 mm nonobstructing calculus 

in the left lower pole of the left kidney





POD #7 ultrasound-guided chest tube insertion by interventional radiology.





Leukocytosis, unknown etiology








The patient is currently sitting up in a chair in no acute distress.  Denies 

shortness of breath, does complain of lower back pain/cramping, mostly at night.

 Left posterior chest pigtail catheter remains present to [facility] with serous 

output status post alteplase instillation.  No new concerns.





Objective





- Vital Signs


Vital signs: 


                                   Vital Signs











Temp  98.1 F   08/29/19 07:00


 


Pulse  81   08/29/19 07:00


 


Resp  15   08/29/19 07:00


 


BP  151/86   08/29/19 07:00


 


Pulse Ox  96   08/29/19 07:00








                                 Intake & Output











 08/28/19 08/29/19 08/29/19





 18:59 06:59 18:59


 


Intake Total  1550 


 


Output Total 450 410 


 


Balance -450 1140 


 


Weight  102 kg 


 


Intake:   


 


  Intake, IV Titration  800 





  Amount   


 


    Piperacillin-Tazobactam 3  100 





    .375 gm In Sodium   





    Chloride 0.9% 100 ml @ 25   





    mls/hr IVPB Q8H [facility] Rx#:   





    [id number]   


 


    Sodium Chloride 0.9% 1,  200 





    000 ml @ 20 mls/hr IV .   





    Q24H [facility] Rx#:[id number]   


 


    Vancomycin 1,750 mg In  500 





    Sodium Chloride 0.9% 500   





    ml 500 ml @ 167 mls/hr   





    IVPB Q16H [facility] Rx#:   





    [id number]   


 


  Oral  750 


 


Output:   


 


  Chest Tube Drainage  410 


 


    left posterior chest  410 


 


  Urine 450  


 


Other:   


 


  Voiding Method Toilet  





 Urinal  


 


  # Voids  2 














- Constitutional


General appearance: Present: cooperative, no acute distress





- Respiratory


Details: 





Lungs sounds diminished bilaterally, left greater than right.  Respirations 

even, nonlabored.  Currently on room air with oxygen saturation 97%.  Able to 

achieve 2000 mL on his incentive spirometry.  Strong cough.  Left posterior 

chest pigtail catheter present, connected to waterseal, 200 mL serosanguineous 

drainage overnight, 450 milliliters output in the last 24 hours.  Intermittent 

trace air leak present.





- Cardiovascular


Details: 





S1, S2 present.  Regular rate and rhythm.  Palpable peripheral pulses 

bilaterally.  No edema present.  No calf pain or tenderness noted.





- Gastrointestinal


Gastrointestinal Comment(s): 





Abdomen soft, nontender, nondistended.  Active bowel sounds 4 quadrants.  

Tolerating diet.





- Genitourinary


Genitourinary Comment(s): 





Continues to void clear, yellow urine.





- Integumentary


Integumentary Comment(s): 





Skin is warm and dry with evidence of good perfusion.





- Neurologic


Neurologic: Present: CNII-XII intact





- Musculoskeletal


Musculoskeletal: Present: gait normal, strength equal bilaterally





- Psychiatric


Psychiatric: Present: A&O x's 3, appropriate affect, intact judgment & insight





- Allied health notes


Allied health notes reviewed: nursing





- Labs


CBC & Chem 7: 


                                 08/29/19 06:58





                                 08/29/19 06:58


Labs: 


                  Abnormal Lab Results - Last 24 Hours (Table)











  08/28/19 08/28/19 08/29/19 Range/Units





  09:13 09:13 06:58 


 


WBC  15.3 H   15.4 H  (3.8-10.6)  k/uL


 


Plt Count    130 L  (150-450)  k/uL


 


Neutrophils #  12.8 H    (1.3-7.7)  k/uL


 


Lymphocytes #  0.8 L    (1.0-4.8)  k/uL


 


Monocytes #  1.4 H    (0-1.0)  k/uL


 


Chloride   109 H   ()  mmol/L


 


Carbon Dioxide     (22-30)  mmol/L


 


BUN   30 H   (9-20)  mg/dL


 


Calcium   8.2 L   (8.4-10.2)  mg/dL














  08/29/19 Range/Units





  06:58 


 


WBC   (3.8-10.6)  k/uL


 


Plt Count   (150-450)  k/uL


 


Neutrophils #   (1.3-7.7)  k/uL


 


Lymphocytes #   (1.0-4.8)  k/uL


 


Monocytes #   (0-1.0)  k/uL


 


Chloride  116 H  ()  mmol/L


 


Carbon Dioxide  15 L  (22-30)  mmol/L


 


BUN  22 H  (9-20)  mg/dL


 


Calcium   (8.4-10.2)  mg/dL








                      Microbiology - Last 24 Hours (Table)











 08/26/19 12:10 Gram Stain - Final





 Bronchial Washings - Right Bronchial Washings Culture - Final














- Imaging and Cardiology


Chest x-ray: report reviewed, image reviewed





Assessment and Plan


Assessment: 





1.  Left-sided pleural effusion with near complete collapse of the left lung, 

possibly malignant , status post left thoracentesis 2 L of fluid removed, status

post left chest pigtail catheter placement per interventional radiology


2.  Left-sided pneumothorax, status post thoracentesis, possible trapped lung


3.  History of CVA, no residual deficits


4.  History of diverticulitis and GI bleeding requiring low anterior resection 

October 18 2018.


5.  History of iron deficiency anemia secondary to GI blood losses


6.  History of masslike collection within the abdomen measuring 87 cm between 

the sigmoid and urinary bladder treated mostly with antibiotics.


7.  History of hypertension


8.  Hyperlipidemia


9.  Leukocytosis, unknown etiology


Plan: 





1.  Will continue left pigtail catheter to waterseal


2.  Will instill Alteplase to the left chest pigtail catheter again today.  

Clamp for 1 hour post instillation.


3.  Encourage incentive spirometry use


4.  Increase activity as tolerated, ambulate in hallway.


5.  Pulmonary management recommendations per Dr. Delong. 


6.  DVT and GI prophylaxis.


7.  No surgical intervention is warranted at this time and this was discussed 

with the patient by Dr. Ramirez.


8.  Medical management per primary care services.


9.  More recommendations to follow based on patient's clinical course.


Time with Patient: Greater than 30

## 2019-08-29 NOTE — P.PN
Subjective


Progress Note Date: 08/29/19





Nilda More is a 80 yo M with PMH significant for CVA, HTN, HLD who presented 

to the ED at the request of his PCP after a large L pleural effusion was noted 

on an outpatient imaging study. He went for a CT to evaluate suspected 

nephrolithiasis, CT did show 3 mm non-obstructing stone but incidentally noted 

pleural effusion. This was then evaluated more fully with a chest CT which 

showed almost complete collapse of the L lung with only the apex aerated. Pt 

denies chest pain, shortness of breath, pain with inspiration. He does feel that

he had been getting dyspneic with exertion more recently and had noticed a 

cough. No fever, chills, night sweats or recent weight loss. He is a former 

smoker. In the ED, CXR again confirmed large L effusion and pulmonology was 

consulted and performed thoracentesis with 2000 ml greenish fluid removed.





8/27. Pt with pleurex catheter in place, he denies chest pain or shortness of 

breath. Pleural fluid culture remains negative for malignant cells and culture 

negative. He is s/p bronchoscopy which was unremarkable. WBC trending down and 

pt remains on abx. Pulmonology and CT surgery following.





8/28. His pleurex catheter with continued scant drainage. CXR this am with 

stable L pneumothorax and LML consolidation. He is feeling well from a 

respiratory perspective but is complaining of more low back pain which he 

attributes to lying in the hospital bed.





8/29. He is stable today, pleurex catheter continues with serous drainage, 

infused with alteplase today. Pt continues to deny chest pain or shortness of 

breath, back pain controlled with norco and ibuprofen.





Objective





- Vital Signs


Vital signs: 


                                   Vital Signs











Temp  98.1 F   08/29/19 07:00


 


Pulse  81   08/29/19 07:00


 


Resp  16   08/29/19 08:00


 


BP  151/86   08/29/19 07:00


 


Pulse Ox  96   08/29/19 07:00








                                 Intake & Output











 08/28/19 08/29/19 08/29/19





 18:59 06:59 18:59


 


Intake Total  1550 


 


Output Total 450 410 


 


Balance -450 1140 


 


Weight  102 kg 


 


Intake:   


 


  Intake, IV Titration  800 





  Amount   


 


    Piperacillin-Tazobactam 3  100 





    .375 gm In Sodium   





    Chloride 0.9% 100 ml @ 25   





    mls/hr IVPB Q8H [facility] Rx#:   





    [id number]   


 


    Sodium Chloride 0.9% 1,  200 





    000 ml @ 20 mls/hr IV .   





    Q24H [facility] Rx#:[id number]   


 


    Vancomycin 1,750 mg In  500 





    Sodium Chloride 0.9% 500   





    ml 500 ml @ 167 mls/hr   





    IVPB Q16H [facility] Rx#:   





    [id number]   


 


  Oral  750 


 


Output:   


 


  Chest Tube Drainage  410 


 


    left posterior chest  410 


 


  Urine 450  


 


Other:   


 


  Voiding Method Toilet  Toilet





 Urinal  Urinal


 


  # Voids  2 














- Exam





Gen: well developed, well nourished, NAD. Vitals reviewed


CV: RRR, no murmur, pulses 2+


Lungs: crackles at L base, normal effort


Ext: no edema. Spasm and tenderness to lumbar paraspinal muscles


Skin: warm and dry





- Labs


CBC & Chem 7: 


                                 08/29/19 06:58





                                 08/29/19 06:58


Labs: 


                  Abnormal Lab Results - Last 24 Hours (Table)











  08/29/19 08/29/19 Range/Units





  06:58 06:58 


 


WBC  15.4 H   (3.8-10.6)  k/uL


 


Plt Count  130 L   (150-450)  k/uL


 


Chloride   116 H  ()  mmol/L


 


Carbon Dioxide   15 L  (22-30)  mmol/L


 


BUN   22 H  (9-20)  mg/dL








                      Microbiology - Last 24 Hours (Table)











 08/26/19 12:10 Gram Stain - Final





 Bronchial Washings - Right Bronchial Washings Culture - Final














Assessment and Plan


(1) Empyema of left pleural space


Current Visit: Yes   Status: Acute   Code(s): J86.9 - PYOTHORAX WITHOUT FISTULA 

 SNOMED Code(s): 33018228


   





(2) Pleural effusion on left


Current Visit: Yes   Status: Acute   Code(s): J90 - PLEURAL EFFUSION, NOT 

ELSEWHERE CLASSIFIED   SNOMED Code(s): 62867740


   





(3) HTN (hypertension)


Current Visit: Yes   Status: Acute   Code(s): I10 - ESSENTIAL (PRIMARY) 

HYPERTENSION   SNOMED Code(s): 89771170


   





(4) Type 2 diabetes mellitus


Current Visit: Yes   Status: Acute   Code(s): E11.9 - TYPE 2 DIABETES MELLITUS 

WITHOUT COMPLICATIONS   SNOMED Code(s): 85304539


   





(5) Acute low back pain


Current Visit: Yes   Status: Acute   Code(s): M54.5 - LOW BACK PAIN   SNOMED 

Code(s): 809091082


   


Plan: 





1. L pleural effusion. Pulmonology and CT surgery following. Cytology negative. 

Continue empiric coverage for empyema.


2. LML consolidation. Bronchoscopy unremarkable. Given alteplase today for 

suspected collapsed lung


3. Low back pain. Secondary to immobilization. Ambulate. Ibuprofen and norco prn


3. T2DM. Accucheck, sliding scale


4. HTN, continue toprol

## 2019-08-29 NOTE — XR
EXAMINATION TYPE: XR chest 1V portable

 

DATE OF EXAM: 8/29/2019

 

COMPARISON: 8/28/2019

 

HISTORY: Abnormal x-ray

 

TECHNIQUE: Single frontal view of the chest is obtained.

 

FINDINGS:  Large left pneumothorax remains present with areas of subsegmental consolidation. Right haven
ng remains clear. Background chronic emphysematous change. Cardiac silhouette size is stable and mild
ly enlarged. No mediastinal shift is present. 

 

Osseous structures are demineralized. Retrocardiac density on the left is stable. May represent conso
lidated lung neoplasm. Arthropathy shoulders. Pigtail catheter not well-seen on today's exam. Subcuta
neous emphysema noted. 

 

 

IMPRESSION:  Stable large left-sided pneumothorax

## 2019-08-30 RX ADMIN — PIPERACILLIN AND TAZOBACTAM SCH MLS/HR: 3; .375 INJECTION, POWDER, FOR SOLUTION INTRAVENOUS at 03:34

## 2019-08-30 RX ADMIN — PIPERACILLIN AND TAZOBACTAM SCH MLS/HR: 3; .375 INJECTION, POWDER, FOR SOLUTION INTRAVENOUS at 21:18

## 2019-08-30 RX ADMIN — IBUPROFEN SCH MG: 400 TABLET, FILM COATED ORAL at 15:18

## 2019-08-30 RX ADMIN — CEFAZOLIN SCH: 330 INJECTION, POWDER, FOR SOLUTION INTRAMUSCULAR; INTRAVENOUS at 15:09

## 2019-08-30 RX ADMIN — HYDROCODONE BITARTRATE AND ACETAMINOPHEN PRN EACH: 5; 325 TABLET ORAL at 00:12

## 2019-08-30 RX ADMIN — SODIUM CHLORIDE SCH MLS/HR: 9 INJECTION, SOLUTION INTRAVENOUS at 06:02

## 2019-08-30 RX ADMIN — PIPERACILLIN AND TAZOBACTAM SCH MLS/HR: 3; .375 INJECTION, POWDER, FOR SOLUTION INTRAVENOUS at 13:06

## 2019-08-30 RX ADMIN — IBUPROFEN SCH MG: 400 TABLET, FILM COATED ORAL at 21:19

## 2019-08-30 RX ADMIN — IBUPROFEN SCH MG: 400 TABLET, FILM COATED ORAL at 09:06

## 2019-08-30 RX ADMIN — SODIUM CHLORIDE SCH MLS/HR: 9 INJECTION, SOLUTION INTRAVENOUS at 22:30

## 2019-08-30 RX ADMIN — METOPROLOL SUCCINATE SCH MG: 50 TABLET, EXTENDED RELEASE ORAL at 09:06

## 2019-08-30 NOTE — P.PN
Subjective


Progress Note Date: 08/30/19


Principal diagnosis: 





Left-sided pleural effusion with near complete collapse of the left lung, 

possibly malignant, status post left thoracentesis with 2 L of fluid removed by 

pulmonary medicine.  Previous medical history of hypertension, hyperlipidemia, 

previous CVA, GI bleed, diverticulitis status post low anterior resection, 

recent nephrolithiasis with renal study showing a 3 mm nonobstructing calculus 

in the left lower pole of the left kidney





POD #8 ultrasound-guided chest tube insertion by interventional radiology.





Leukocytosis, unknown etiology








The patient is currently sitting up in a chair in no acute distress.  Denies 

shortness of breath, does complain of lower back pain as well as referred left 

shoulder pain.  Left-sided pigtail catheter was removed yesterday.  No new 

concerns.





Objective





- Vital Signs


Vital signs: 


                                   Vital Signs











Temp  98.3 F   08/30/19 07:14


 


Pulse  70   08/30/19 07:14


 


Resp  15   08/30/19 07:14


 


BP  152/96   08/30/19 07:14


 


Pulse Ox  97   08/30/19 07:14








                                 Intake & Output











 08/29/19 08/30/19 08/30/19





 18:59 06:59 18:59


 


Intake Total  850 


 


Output Total 600  


 


Balance -600 850 


 


Weight 102 kg 110.5 kg 


 


Intake:   


 


  Intake, IV Titration  600 





  Amount   


 


    Piperacillin-Tazobactam 3  100 





    .375 gm In Sodium   





    Chloride 0.9% 100 ml @ 25   





    mls/hr IVPB Q8H [facility] Rx#:   





    [id number]   


 


    Vancomycin 1,750 mg In  500 





    Sodium Chloride 0.9% 500   





    ml 500 ml @ 167 mls/hr   





    IVPB Q16H [facility] Rx#:   





    [id number]   


 


  Oral  250 


 


Output:   


 


  Urine 600  


 


Other:   


 


  Voiding Method Toilet Urinal 





 Urinal  


 


  # Voids 2 3 














- Constitutional


General appearance: Present: cooperative, no acute distress, obese





- Respiratory


Details: 





Lungs sounds diminished bilaterally, left greater than right.  Respirations 

even, nonlabored.  Currently on room air with oxygen saturation 96%.  Able to 

achieve 2000 mL on his incentive spirometry.  Strong cough.  





- Cardiovascular


Details: 





S1, S2 present.  Regular rate and rhythm.  Palpable peripheral pulses 

bilaterally.  No edema present.  No calf pain or tenderness noted.





- Gastrointestinal


Gastrointestinal Comment(s): 





Abdomen soft, nontender, nondistended.  Active bowel sounds 4 quadrants.  

Tolerating diet.





- Genitourinary


Genitourinary Comment(s): 





Continues to void clear, yellow urine.





- Integumentary


Integumentary Comment(s): 





Skin is warm and dry with evidence of good perfusion.  Left pigtail catheter 

site covered with dry intact dressing.  Subcu emphysema present to mid back





- Neurologic


Neurologic: Present: CNII-XII intact





- Musculoskeletal


Musculoskeletal: Present: gait normal, strength equal bilaterally





- Psychiatric


Psychiatric: Present: A&O x's 3, appropriate affect, intact judgment & insight





- Allied health notes


Allied health notes reviewed: nursing





- Labs


CBC & Chem 7: 


                                 08/29/19 06:58





                                 08/29/19 06:58





Assessment and Plan


Assessment: 





1.  Left-sided pleural effusion with near complete collapse of the left lung, 

possibly malignant , status post left thoracentesis 2 L of fluid removed, status

post left chest pigtail catheter placement per interventional radiology


2.  Left-sided pneumothorax, status post thoracentesis, possible trapped lung


3.  History of CVA, no residual deficits


4.  History of diverticulitis and GI bleeding requiring low anterior resection 

October 18 2018.


5.  History of iron deficiency anemia secondary to GI blood losses


6.  History of masslike collection within the abdomen measuring 87 cm between 

the sigmoid and urinary bladder treated mostly with antibiotics.


7.  History of hypertension


8.  Hyperlipidemia


9.  Leukocytosis, unknown etiology


Plan: 





1.  Left pigtail catheter discontinued yesterday.  Chest x-ray this morning 

stable


2.  Will repeat 2V chest x-ray tomorrow morning.  


3.  Encourage incentive spirometry use


4.  Increase activity as tolerated, ambulate in hallway.


5.  Pulmonary management recommendations per Dr. Delong. 


6.  DVT and GI prophylaxis.


7.  No surgical intervention is warranted at this time and this was discussed 

with the patient by Dr. Ramirez.


8.  Medical management per primary care services.


9.  More recommendations to follow based tomorrow morning's chest x-ray.  This 

was discussed with primary care service and pulmonology.  


Time with Patient: Greater than 30

## 2019-08-30 NOTE — P.PN
Subjective


Progress Note Date: 08/30/19


Principal diagnosis: 


Large left pleural effusion





This is a 79-year-old white male with history of previous CVA, hypertension, 

hyperlipidemia,previous history of GI bleeding,history of nephrolithiasis, and 

recent renal study showing 3 mm nonobstructing calculus in the left lower pole 

of the left kidney.during the study, the patient was noted to have left-sided 

pleural effusion, hence he was advised to have a CT of the chest.this was done 

today on outpatient basis, and there was a significant collapse of the left lung

with near complete filling of the left sided pleural space with massive pleural 

effusion.  The only area was 90 portion of the left apex that was noted to be 

aerated based on the CT of the chest.  Mass lesion could not be entirely ruled 

out.  Hence the patient was advised to go to the ER, chest x-ray confirmed what 

was seen on the CT of the chest again, and the patient was admitted.  I went 

down to see the patient in the ER, and I performed a left-sided thoracentesis, I

was able to drain about 2000 mL of greenish color pleural effusion, and follow-

up chest x-ray showed residual pleural effusion which I plan to evaluate by 

ultrasound in the morning, may need another thoracentesis.  Patient will be 

admitted, and the fluid that I drained from the left pleural space was sent for 

different diagnostic studies.patient was last admitted to the hospital in 

October of 2018,back then the patient had low anterior resection for 

diverticulitis, and for ongoing iron deficiency anemia.  Back then he had a 

masslike collection within the abdomen measuring 87 cm between the sigmoid and 

the urinary bladder treated with antibiotics and outpatient setting.pulmonary-

wise, the patient denies any cough, no fever, no chills, no hemoptysis, no 

weight loss, he has some shortness of breath on exertion, and left-sided 

pleuritic chest pain.  This has been going on for the last few days.no history 

of underlying malignancy.





The patient is seen today 08/22/2019 in follow-up on the observation unit.  He 

is currently awake and alert in no acute distress.  He is maintaining good O2 

saturations in the 90s on room air.  He is status post left-sided thoracentesis 

yesterday with 2000 mL of greenish colored pleural fluid removed.  Cultures and 

Gram stain are pending.  Fluid analysis reveals less than 4 glucose, 6300 

nucleated cells, 4 poly-nuclear WBCs, 92 mononuclear WBCs.  Fluid protein 6.6.  

.  Bilirubin pending. Exudative in nature.  No fever, no chills, no night

sweats.  He is currently on IV Solu-Medrol.  9 normal saline at 20 ML's.





On 08/23/2019 patient is seen in follow-up on the surgical floor.  Yesterday 

left-sided pigtail chest tube was inserted by interventional radiology, and was 

connected to Pleur-evac which was placed to suction, and patient has since put 

out additional 1650 ML of dark colored pleural fluid.  Cytology still pending, 

cultures remain negative thus far.  Patient denies any chest pain, denies any 

dyspnea, he states his breathing has much improved, he is currently remains on 

room air, with a pulse ox of 95%, hemodynamically stable, lung sounds reveal an 

air entry on the left, with diminished breath sounds at the left base.  We'll 

obtain incentive spirometer, patient will be encouraged to deep breathing cough,

remains on empiric antibiotics in the form of Zosyn, and IV steroids, today's 

labs have been reviewed showing leukocytosis, of 31.6, possibly steroid induced,

electrolytes are within normal limits, BUN 31 and creatinine is 0.96.





On 08/25/2019 patient seen in follow-up on medical surgical floor.  He is awake 

and alert, in no acute distress, room air pulse ox is 99%, denies any shortness 

of breath, denies any chest pain, afebrile.  There has been 210 mL of dark 

pleural fluid out of the left-sided pigtail chest tube catheter in the last 24 

hours, it is 12 suction, no evidence of air leak, today's chest x-ray has been 

reviewed showing stable fairly moderate to large sized left-sided pneumothorax 

at lateral basilar aspect.  Clinically patient is asymptomatic, fluid for 

cytology was negative for any cytologically malignant cells, pleural fluid 

cultures are negative, patient denies any cough or chest congestion, he has been

scheduled for bronchoscopy with BAL tomorrow on only 25 2019 with Dr. Delong. 





On 08/26/2019 patient seen in follow-up on medical surgical floor.  Denies any 

chest pain, or dyspnea, remains on room air, with a pulse ox of 94%, lung sounds

reveal diminished breath sounds over left lower lobe, clear on the right, 

afebrile, cytology of the pleural fluid did not show cytologically malignant 

cells, pleural fluid cultures remain negative to date.  Patient is scheduled for

bronchoscopy with BAL today with Dr. Delong, he is been nothing by mouth since 

midnight.  Vital signs have been stable.  Today's chest x-ray has been reviewed,

showing stable large sized left pneumothorax unchanged in appearance from 

yesterday.  Patient continues on empiric antibiotics form of Zosyn and 

vancomycin. 





On 08/27/2019 patient seen in follow-up on selective care unit.  He is awake and

alert, in no acute distress, he underwent bronchoscopy with BAL for examination 

of the airways, and there were no masses or tumors noted, there was no bleeding,

the mucosa was normal throughout, there was minimal secretions on the left side,

there were no abnormalities noted.  Patient denies any dyspnea, lung sounds 

yesterday of some slightly diminished breath sounds on the left, pigtail 

catheter still in place with topped Pleur-evac, and there has been 395 ML of 

dark thin pleural fluid, fluid cultures remain negative.  Cytology was negative.

 Case was discussed with CT surgery on the case, and it was decided to try a 

dose of alteplase.  There is no loculation, and the left lung is trapped, [facility]

TS/thoracotomy with decortication is being considered. CT surgery following, 

patient denies any complaints, he is working on incentive spirometer, achieving 

2000 on the today.  Denies any chest pain, denies any cough or congestion, he is

tolerating ambulation.  Remains on Zosyn and vancomycin, cultures remain 

negative, patient is afebrile.





On 08/28/2019 patient seen in follow-up on medical surgical floor.  He is awake 

and alert, in no acute distress, he sitting up in the recliner, left-sided pi

gtail chest tube remains in place, there has been a total of 300 mL of dark 

pleural fluid drainage after the TPA infusion yesterday, follow-up chest x-ray 

shows stable large left-sided pneumothorax.  Clinically patient denies any 

distress, he stated he had some mild left-sided chest discomfort, relieved with 

pain medications, denies any discomfort today.  He is working on incentive 

spirometer, he is able to achieve 0357-4863 ML on it today, room air pulse ox is

96%.  Today's labs have been reviewed, white blood cell count is 15.3, 

hemoglobin is 14.1, electrolytes and renal profile relatively unremarkable.  

Bronchial wash cultures are negative thus far, bronchial washing cytology is 

negative





On 08/29/2019 patient seen in follow-up on medical surgical floor.  He is been 

complaining of some back pain, but denies any respiratory distress, left-sided 

pigtail chest tube is in place, and there has been 450 mL of pleural fluid 

drainage which seems to be less dark, more serous in color.  The chest tube is 

to water seal now, no air leak, today's chest x-ray shows stable large left-

sided pneumothorax, and subcutaneous emphysema at the pigtail site insertion, 

left posterior lower lobe, lateral chest wall, and some subcutaneous emphysema 

on the left side of the neck





On 08/30/2019 patient seen in follow-up on medical surgical floor.  Yesterday 

his left-sided pigtail chest tube catheter became dislodged, and had to be 

discontinued, the patient remains stable, no worsening shortness of breath, he 

is on room air, today's chest x-ray showed stable left-sided pneumothorax and 

subcutaneous emphysema.  Vitals remained stable, room air pulse ox is 97%, 

afebrile.  Lung sounds reveal diminished breath sounds over left lower lobe.  CT

surgery is planned on keeping the patient for 1 more day, and obtaining follow-

up chest x-ray tomorrow, if there is no worsening in appearance of the chest x-

ray patient is anticipated to be discharged home tomorrow.











Objective





- Vital Signs


Vital signs: 


                                   Vital Signs











Temp  98.3 F   08/30/19 07:14


 


Pulse  70   08/30/19 07:14


 


Resp  16   08/30/19 08:00


 


BP  152/96   08/30/19 07:14


 


Pulse Ox  97   08/30/19 07:14








                                 Intake & Output











 08/29/19 08/30/19 08/30/19





 18:59 06:59 18:59


 


Intake Total  850 


 


Output Total 600  


 


Balance -600 850 


 


Weight 102 kg 110.5 kg 


 


Intake:   


 


  Intake, IV Titration  600 





  Amount   


 


    Piperacillin-Tazobactam 3  100 





    .375 gm In Sodium   





    Chloride 0.9% 100 ml @ 25   





    mls/hr IVPB Q8H [facility] Rx#:   





    [id number]   


 


    Vancomycin 1,750 mg In  500 





    Sodium Chloride 0.9% 500   





    ml 500 ml @ 167 mls/hr   





    IVPB Q16H [facility] Rx#:   





    [id number]   


 


  Oral  250 


 


Output:   


 


  Urine 600  


 


Other:   


 


  Voiding Method Toilet Urinal Urinal





 Urinal  


 


  # Voids 2 3 














- Exam


 GENERAL EXAM: Alert, pleasant, 79-year-old white male, on room air, in no acute

distress, comfortable in no apparent distress.


HEAD: Normocephalic/atraumatic.


EYES: Normal reaction of pupils, equal size.  Conjunctiva pink, sclera white.


NOSE: Clear with pink turbinates.


THROAT: No erythema or exudates.


NECK: No masses, no JVD, no thyroid enlargement, no adenopathy.  Mild 

subcutaneous emphysema at the base of the left neck


CHEST: No chest wall deformity.  Symmetrical expansion.   Subcutaneous emphysema

surrounding the left pigtail insertion site, left lateral lower chest wall


LUNGS: Equal air entry with crackles likely related to subcutaneous emphysema 

over left lower lobe wheeze, rhonchi or dullness.


CVS: Regular rate and rhythm, normal S1 and S2, no gallops, no murmurs, no rubs


ABDOMEN: Soft, nontender.  No hepatosplenomegaly, normal bowel sounds, no 

guarding or rigidity.


EXTREMITIES: No clubbing, no edema, no cyanosis, 2+ pulses and upper and lower 

extremities.


MUSCULOSKELETAL: Muscle strength and tone normal.


SPINE: No scoliosis or deformity


SKIN: No rashes


CENTRAL NERVOUS SYSTEM: Alert and oriented -3.  No focal deficits, tone is 

normal in all 4 extremities.


PSYCHIATRIC: Alert and oriented -3.  Appropriate affect.  Intact judgment and 

insight.











- Labs


CBC & Chem 7: 


                                 08/29/19 06:58





                                 08/29/19 06:58





Assessment and Plan


Plan: 


 Assessment:





1 Left-sided pleural effusion with near complete collapse of the left lung, 

possibly malignant , status post left thoracentesis 2 L of fluid removed, and 

the fluid was sent for different diagnostic studies.  Patient is status post 

thoracentesis would removal of 2 L of pleural fluid, with additional fluid 

remaining in the left pleural space, status post left chest pigtail chest tube, 

no cytologically malignant cells on the pleural fluid cytology, cultures are 

negative thus far.





2 left-sided pneumothorax, status post thoracentesis, trapped lung





3 previous history of CVA, presently asymptomatic.





4 history of diverticulitis and GI bleeding requiring low anterior resection 

October 18 2018.





5 history of iron deficiency anemia secondary to GI blood losses





6 history of masslike collection within the abdomen measuring 87 cm between the

sigmoid and urinary bladder treated mostly with antibiotics.





7 benign essential hypertension





8 hyperlipidemia





Plan:





Continue current medical treatment, continue the antibiotics, all microbiology 

data remains negative, continue encouraging deep breathing and coughing, 

ambulation, incentive spirometry use, today's chest x-ray has been reviewed, 

showing essentially stable findings of left pneumothorax, trapped lung, without 

significant changes.  CT surgery is following, and is planning on obtaining a fo

llow-up chest x-ray tomorrow, anticipate possible discharge tomorrow.





I performed a history & physical examination of the patient and discussed their 

management with my nurse practitioner, Lidia Lassiter.  I reviewed the nurse 

practitioner's note and agree with the documented findings and plan of care.  

Lung sounds are positive for diminished breath sounds.  The findings and the 

impression was discussed with the patient.  I attest to the documentation by the

nurse practitioner. 





Time with Patient: Less than 30

## 2019-08-30 NOTE — P.PN
Subjective


Progress Note Date: 08/30/19





Nilda More is a 80 yo M with PMH significant for CVA, HTN, HLD who presented 

to the ED at the request of his PCP after a large L pleural effusion was noted 

on an outpatient imaging study. He went for a CT to evaluate suspected 

nephrolithiasis, CT did show 3 mm non-obstructing stone but incidentally noted 

pleural effusion. This was then evaluated more fully with a chest CT which 

showed almost complete collapse of the L lung with only the apex aerated. Pt 

denies chest pain, shortness of breath, pain with inspiration. He does feel that

he had been getting dyspneic with exertion more recently and had noticed a 

cough. No fever, chills, night sweats or recent weight loss. He is a former 

smoker. In the ED, CXR again confirmed large L effusion and pulmonology was 

consulted and performed thoracentesis with 2000 ml greenish fluid removed.





8/27. Pt with pleurex catheter in place, he denies chest pain or shortness of 

breath. Pleural fluid culture remains negative for malignant cells and culture 

negative. He is s/p bronchoscopy which was unremarkable. WBC trending down and 

pt remains on abx. Pulmonology and CT surgery following.





8/28. His pleurex catheter with continued scant drainage. CXR this am with 

stable L pneumothorax and LML consolidation. He is feeling well from a 

respiratory perspective but is complaining of more low back pain which he 

attributes to lying in the hospital bed.





8/29. He is stable today, pleurex catheter continues with serous drainage, 

infused with alteplase today. Pt continues to deny chest pain or shortness of 

breath, back pain controlled with norco and ibuprofen.





8/30. His L pigtail catherter was removed yesterday, his CXR is stable, still 

shows pneumothorax and small L effusion. CT surgery recommending continued 

observation to demonstrate resolution of pneumothorax. No evidence of malignant 

cells in fluid.





Objective





- Vital Signs


Vital signs: 


                                   Vital Signs











Temp  98.3 F   08/30/19 07:14


 


Pulse  70   08/30/19 07:14


 


Resp  15   08/30/19 07:14


 


BP  152/96   08/30/19 07:14


 


Pulse Ox  97   08/30/19 07:14








                                 Intake & Output











 08/29/19 08/30/19 08/30/19





 18:59 06:59 18:59


 


Intake Total  850 


 


Output Total 600  


 


Balance -600 850 


 


Weight 102 kg 110.5 kg 


 


Intake:   


 


  Intake, IV Titration  600 





  Amount   


 


    Piperacillin-Tazobactam 3  100 





    .375 gm In Sodium   





    Chloride 0.9% 100 ml @ 25   





    mls/hr IVPB Q8H [facility] Rx#:   





    [id number]   


 


    Vancomycin 1,750 mg In  500 





    Sodium Chloride 0.9% 500   





    ml 500 ml @ 167 mls/hr   





    IVPB Q16H [facility] Rx#:   





    [id number]   


 


  Oral  250 


 


Output:   


 


  Urine 600  


 


Other:   


 


  Voiding Method Toilet Urinal 





 Urinal  


 


  # Voids 2 3 














- Exam





Gen: well developed, well nourished, NAD. Vitals reviewed


CV: RRR, no murmur, pulses 2+


Lungs: crackles at L base, normal effort


Ext: no edema. Spasm and tenderness to lumbar paraspinal muscles


Skin: warm and dry





- Labs


CBC & Chem 7: 


                                 08/29/19 06:58





                                 08/29/19 06:58





Assessment and Plan


(1) Empyema of left pleural space


Current Visit: Yes   Status: Acute   Code(s): J86.9 - PYOTHORAX WITHOUT FISTULA 

 SNOMED Code(s): 64266828


   





(2) Pleural effusion on left


Current Visit: Yes   Status: Acute   Code(s): J90 - PLEURAL EFFUSION, NOT 

ELSEWHERE CLASSIFIED   SNOMED Code(s): 60750286


   





(3) HTN (hypertension)


Current Visit: Yes   Status: Acute   Code(s): I10 - ESSENTIAL (PRIMARY) 

HYPERTENSION   SNOMED Code(s): 84164378


   





(4) Type 2 diabetes mellitus


Current Visit: Yes   Status: Acute   Code(s): E11.9 - TYPE 2 DIABETES MELLITUS 

WITHOUT COMPLICATIONS   SNOMED Code(s): 77284482


   





(5) Acute low back pain


Current Visit: Yes   Status: Acute   Code(s): M54.5 - LOW BACK PAIN   SNOMED 

Code(s): 947513625


   


Plan: 





1. L pleural effusion. Pulmonology and CT surgery following. Cytology negative. 

Continue empiric coverage for empyema per pulmonology


2. L pneumothorax. Follow CXR to ensure stable/decreasing


3. LML consolidation. Bronchoscopy unremarkable. Suspect collapsed lung. CT 

surgery recommending continued observation no need for surgery at this time


4. Low back pain. Secondary to immobilization. Ambulate. Ibuprofen and tramadol 

prn


5. T2DM. Accucheck, sliding scale


6. HTN, continue toprol

## 2019-08-30 NOTE — XR
EXAMINATION TYPE: XR chest 2V

 

DATE OF EXAM: 8/30/2019

 

COMPARISON: 8/29/2019

 

TECHNIQUE: PA and lateral views submitted.

 

HISTORY: Trapped along

 

FINDINGS:

Large left pneumothorax remains present with areas of subsegmental consolidation. Pleural effusion on
 the left noted. Subsegmental changes at the right lung base with small right effusion noted.. Backgr
ound chronic emphysematous change. Cardiac silhouette size is stable and mildly enlarged. No mediasti
nal shift is present. 

 

Osseous structures are demineralized. Retrocardiac density on the left is stable. May represent conso
lidated lung neoplasm. Arthropathy shoulders. Pigtail catheter not well-seen on today's exam. Subcuta
neous emphysema noted. 

 

 

IMPRESSION:

1. Stable left-sided pneumothorax and subcutaneous emphysema.

## 2019-08-31 VITALS
SYSTOLIC BLOOD PRESSURE: 166 MMHG | HEART RATE: 62 BPM | TEMPERATURE: 98.2 F | RESPIRATION RATE: 16 BRPM | DIASTOLIC BLOOD PRESSURE: 64 MMHG

## 2019-08-31 LAB
ERYTHROCYTE [DISTWIDTH] IN BLOOD BY AUTOMATED COUNT: 4.44 M/UL (ref 4.3–5.9)
ERYTHROCYTE [DISTWIDTH] IN BLOOD: 15.5 % (ref 11.5–15.5)
HCT VFR BLD AUTO: 40.7 % (ref 39–53)
HGB BLD-MCNC: 13.2 GM/DL (ref 13–17.5)
MCH RBC QN AUTO: 29.7 PG (ref 25–35)
MCHC RBC AUTO-ENTMCNC: 32.4 G/DL (ref 31–37)
MCV RBC AUTO: 91.7 FL (ref 80–100)
PLATELET # BLD AUTO: 139 K/UL (ref 150–450)
WBC # BLD AUTO: 13.6 K/UL (ref 3.8–10.6)

## 2019-08-31 RX ADMIN — PIPERACILLIN AND TAZOBACTAM SCH MLS/HR: 3; .375 INJECTION, POWDER, FOR SOLUTION INTRAVENOUS at 11:21

## 2019-08-31 RX ADMIN — SODIUM CHLORIDE SCH MLS/HR: 9 INJECTION, SOLUTION INTRAVENOUS at 14:06

## 2019-08-31 RX ADMIN — HYDROCODONE BITARTRATE AND ACETAMINOPHEN PRN EACH: 5; 325 TABLET ORAL at 08:37

## 2019-08-31 RX ADMIN — METOPROLOL SUCCINATE SCH MG: 50 TABLET, EXTENDED RELEASE ORAL at 08:37

## 2019-08-31 RX ADMIN — PIPERACILLIN AND TAZOBACTAM SCH MLS/HR: 3; .375 INJECTION, POWDER, FOR SOLUTION INTRAVENOUS at 03:23

## 2019-08-31 RX ADMIN — CEFAZOLIN SCH MLS/HR: 330 INJECTION, POWDER, FOR SOLUTION INTRAMUSCULAR; INTRAVENOUS at 14:07

## 2019-08-31 RX ADMIN — IBUPROFEN SCH MG: 400 TABLET, FILM COATED ORAL at 08:37

## 2019-08-31 RX ADMIN — IBUPROFEN SCH MG: 400 TABLET, FILM COATED ORAL at 15:00

## 2019-08-31 NOTE — XR
EXAMINATION TYPE: XR chest 2V

 

DATE OF EXAM: 8/31/2019

 

COMPARISON: 8/30/2019

 

HISTORY: 79-year-old male pneumothorax

 

TECHNIQUE:  PA and lateral views

 

FINDINGS:  

Heart borderline in size. Mild interstitial prominence and mild hyperinflation. Continued small-to-mo
derate left effusion, slightly increased. Redemonstrated moderate-sized left pneumothorax. Medial upp
er component measures 1.4 cm. Apical component measures 1.0 cm. Peripheral mid to lower lung componen
t measures 2.1 cm versus 1.8 cm, previously. However, direct comparison is difficult as the compariso
n exam was markedly rotated. Subcutaneous emphysema left chest wall

 

 

IMPRESSION:  

 

1. Difficult direct comparison as the prior study was rotated. Suspect continuation of a moderate to 
large left pneumothorax. No mediastinal shift.

2. Background COPD.

3. Small-to-moderate left pleural effusion slightly increased in the interval.

## 2019-08-31 NOTE — P.PN
Subjective


Progress Note Date: 08/31/19


Principal diagnosis: 





Left-sided pleural effusion with near complete collapse of the left lung, 

possibly malignant, status post left thoracentesis with 2 L of fluid removed by 

pulmonary medicine.  Previous medical history of hypertension, hyperlipidemia, 

previous CVA, GI bleed, diverticulitis status post low anterior resection, 

recent nephrolithiasis with renal study showing a 3 mm nonobstructing calculus 

in the left lower pole of the left kidney





POD #9 ultrasound-guided chest tube insertion by interventional radiology.





Leukocytosis, unknown etiology








The patient is currently sitting up in a chair eating lunch in no acute 

distress.  Denies shortness of breath, states pain has decreased and is 

controlled on current medication regimen.  Has been ambulatory in the hallway. 

Repeat CXR this AM stable.   No new concerns.





Objective





- Vital Signs


Vital signs: 


                                   Vital Signs











Temp  97.7 F   08/31/19 06:51


 


Pulse  73   08/31/19 06:51


 


Resp  18   08/31/19 06:51


 


BP  167/80   08/31/19 06:51


 


Pulse Ox  97   08/31/19 06:51








                                 Intake & Output











 08/30/19 08/31/19 08/31/19





 18:59 06:59 18:59


 


Intake Total 180 10 120


 


Output Total 1300  


 


Balance -1120 10 120


 


Weight  109.8 kg 


 


Intake:   


 


  Oral 180 10 120


 


Output:   


 


  Urine 1300  


 


Other:   


 


  Voiding Method Urinal  


 


  # Voids 2 1 














- Constitutional


General appearance: Present: cooperative, no acute distress, obese





- Respiratory


Details: 





Lungs sounds diminished bilaterally, left greater than right.  Respirations 

even, nonlabored.  Currently on room air with oxygen saturation 96%.  Able to 

achieve 2000 mL on his incentive spirometry.  Strong cough.  





- Cardiovascular


Details: 





S1, S2 present.  Regular rate and rhythm.  Palpable peripheral pulses 

bilaterally.  No edema present.  No calf pain or tenderness noted.





- Gastrointestinal


Gastrointestinal Comment(s): 





Abdomen soft, nontender, nondistended.  Active bowel sounds 4 quadrants.  

Tolerating diet.





- Genitourinary


Genitourinary Comment(s): 





Continues to void clear, yellow urine.





- Integumentary


Integumentary Comment(s): 





Skin is warm and dry with evidence of good perfusion.  Left pigtail catheter 

site covered with dry intact dressing.





- Neurologic


Neurologic: Present: CNII-XII intact





- Musculoskeletal


Musculoskeletal: Present: gait normal, strength equal bilaterally





- Psychiatric


Psychiatric: Present: A&O x's 3, appropriate affect, intact judgment & insight





- Allied health notes


Allied health notes reviewed: nursing





- Labs


CBC & Chem 7: 


                                 08/31/19 06:27





                                 08/31/19 06:27


Labs: 


                  Abnormal Lab Results - Last 24 Hours (Table)











  08/31/19 Range/Units





  06:27 


 


WBC  13.6 H  (3.8-10.6)  k/uL


 


Plt Count  139 L  (150-450)  k/uL














- Imaging and Cardiology


Chest x-ray: report reviewed, image reviewed





Assessment and Plan


Assessment: 





1.  Left-sided pleural effusion with near complete collapse of the left lung, 

possibly malignant , status post left thoracentesis 2 L of fluid removed, status

post left chest pigtail catheter placement per interventional radiology


2.  Left-sided pneumothorax, status post thoracentesis, possible trapped lung


3.  History of CVA, no residual deficits


4.  History of diverticulitis and GI bleeding requiring low anterior resection 

October 18 2018.


5.  History of iron deficiency anemia secondary to GI blood losses


6.  History of masslike collection within the abdomen measuring 87 cm between 

the sigmoid and urinary bladder treated mostly with antibiotics.


7.  History of hypertension


8.  Hyperlipidemia


9.  Leukocytosis, unknown etiology


Plan: 





1.  Left pigtail catheter discontinued yesterday.  Chest x-ray this morning 

stable. 


2.  Encourage incentive spirometry use


3.  Increase activity as tolerated, ambulate in hallway.


4.  Pulmonary management recommendations per Dr. Delong. 


5.  DVT and GI prophylaxis.


6.  Medical management per primary care services.


7.  From our standpoint the patient should be discharged home.  If he develops 

worsening shortness of breath, he has been instructed to call our office and we 

will schedule him for PleurX catheter placement.  The indications were discussed

with the patient and his family. Dr. Ramirez spoke with Dr. Nickerson with these 

recommendations, and paged the covering physicians to update.


Time with Patient: Greater than 30

## 2019-09-05 NOTE — CDI
Documentation Clarification Form



Date: 9/5/2019 9:20:46 AM

From: Julissa Bowman RN CCDS

Phone: [phone]

MRN: [id number]

Admit Date: 8/22/2019 3:26:00 PM

Patient Name: Nilda More

Visit Number: [id number]

Discharge Date:  8/31/2019 6:02:00 PM





ATTENTION: The Clinical Documentation Specialists (CDI) and [facility] Coding Staff 
appreciate your assistance in clarifying documentation. Please respond to the 
clarification below the line at the bottom and electronically sign. The CDI & 
[facility] Coding staff will review the response and follow-up if needed. Please note: 
Queries are made part of the Legal Health Record. If you have any questions, 
please contact the author of this message via ITS.



Dr. Vj Bolanos



Left sided Pneumothorax  is documented in the Pulmonary Progress Notes starting 
8/23/2019



Patients Admitting Diagnosis: Large Left Pleural Effusion 

Post-Operative Diagnosis: Large Left Pleural Effusion 

Procedure performed: Left sided Thoracentesis 



History/Risk Factors: 79 year old male presents to [facility] for 
elective Thoracentesis .  Medical History -  CVA, Hearing disorder, 
Hyperlipidemia, HTN, Deafness 

Clinical Indicators:  

CXR -  Left pneumothroax

Treatment:  Chest Tube Insertion 8/22/2019, Daily Chest Xrays. 

Consults: Interventional Radiology



In order to accurately reflect this patients severity of illness, please 
clarify if the pneumothorax is the result of the surgical procedure? 



* Yes

* No

* Other, please specify __________________

* Unable to determine



(Last Revision: April 2018)

___________________________________________________________________________

MTDD

## 2019-09-05 NOTE — P.PN
Progress Note - Text


Progress Note Date: 09/05/19





This note is for documentation query.  Patient had thoracentesis with 2000 mL of

fluid drained from the pleural space, however the patient was noted to have 

trapped lung with significant air around the compressed lung which failed to 

expand.  Patient did not have a iatrogenic pneumothorax, he had trapped lung .  

The lung failed to expand which is expected .  The pneumothorax was not 

iatrogenic, again it was mostly failure for the lung to expand fully./Trapped 

lung

## 2019-09-06 NOTE — P.DS
Providers


Date of admission: 


08/22/19 15:26





Attending physician: 


Alvarez Dennis MD





Consults: 





                                        





08/21/19 14:15


Consult Physician Routine 


   Consulting Provider: Raquel Walker


   Consult Reason/Comments: effusion


   Do you want consulting provider notified?: Yes





08/22/19 11:54


Consult Physician Routine 


   Consulting Provider: Terrence Ramirez


   Consult Reason/Comments: possible empyema,may need decortication


   Do you want consulting provider notified?: Yes











Primary care physician: 


Imelda Kinney





[facility] Course: 





Diagnoses:


-Acute left pleural effusion, status post thoracocentesis and more than 20 L of 

fluid has been drained out.  Patient is been evaluated by pulmonary and 

cardiothoracic surgery


-Hypertension


-Diabetes mellitus, type II


-Chronic back pain


-History of CVA,


-History of diverticulitis and GI bleed requiring low anterior resection in 

October 2018


-History of most likely collection within the abdomen measurements 8 x 7 cm 

between the sigmoid colon And urinary bladder. Patient is been evaluated by Dr. Green at that time





Hospital course:


This is a 79-year-old white male with history of previous CVA, hypertension, 

hyperlipidemia,previous history of GI bleeding,history of nephrolithiasis, and 

recent renal study showing 3 mm nonobstructing calculus in the left lower pole 

of the left kidney.during the study, the patient was noted to have left-sided 

pleural effusion, hence he was advised to have a CT of the chest.this was done 

on outpatient basis, and there was a significant collapse of the left lung .  So

patient was sent to the emergency room.  Patient underwent left-sided 

thoracocentesis was more than 2000 mm of greenish colored pleural fluid has been

drained out.  Patient has to placing and cardiothoracic surgery been consulted 

on pulmonary team as well.  Eventually patient remained stable and his 2 drains 

were taken out.  Today Dr. Terrence ramirez form cardiothoracic surgery called me 

and he told me patient is cleared from their standpoint for discharge and he can

follow-up as an outpatient if needed patient develops more symptoms.


On Admitting patient thought to have malignant pleural effusion however cytology

test came back negative and culture results of the pleural fluid also was 

negative.  Patient was started on broad-spectrum antibiotics with vancomycin and

Zosyn.  Patient has some leukocytosis and prior to discharge it was improving 

down to 13.6 K.  However patient remains asymptomatic.  Today he denies chest 

pain.  No dyspnea at rest on exertion.  Get up and go test is normal.  No 

abdominal pain.  No nausea vomiting.  No change in urine or bowel habits.  No 

fever.


Patient feels eager to go home today and he has follow-up appointment to which 

he agrees to.


Patient was cleared for discharge today by pulmonary and cardiothoracic team


Problems and management plan were discussed with the patient and he verbalized 

understanding and acceptance


Patient was found stable and can be discharged home however he needs follow-up 

as an outpatient.  Patient instructed he has follow-up appointment with Dr. Gregoria Driver on 9/3/19 at 2:45 PM and with Dr. Walker on 9/9/19 at 1:30 p.m. she

was made aware of these appointments and timing and he agrees with him and sta

toy he is going to follow-up.  Patient also was instructed to follow up with Dr. James beverly and he agrees.  Also was instructed to follow up with  

and he agrees.





Gen: patient is a AAOx3, no distress


CVS: S1-S2, RRR, no murmur


Lungs: B/L CTA, no wheezing


Abdomen: soft, no distention, no tenderness, positive bowel sounds


Extremity: no leg edema or induration





Time spent more than 35 minutes








Patient Condition at Discharge: Fair





Plan - Discharge Summary


Discharge Rx Participant: No


New Discharge Prescriptions: 


New


   Amoxicillin/Potassium Clav [Augmentin 875-125 Tablet] 1 tab PO Q12HR #20 tab


   Acetaminophen Tab [Tylenol] 650 mg PO Q6HR PRN #30 tab


     PRN Reason: Fever and/ or MILD Pain


   HYDROcodone/APAP 5-325MG [Norco 5-325] 1 each PO Q8HR PRN 3 Days #9 tab


     PRN Reason: MODERATE Pain





Continue


   OXcarbazepine [Trileptal] 150 mg PO HS


   Metoprolol Succinate [Toprol Xl] 50 mg PO DAILY


   hydrALAZINE HCL [Apresoline] 25 mg PO DAILY


Discharge Medication List





Metoprolol Succinate [Toprol Xl] 50 mg PO DAILY 08/21/19 [History]


OXcarbazepine [Trileptal] 150 mg PO HS 08/21/19 [History]


hydrALAZINE HCL [Apresoline] 25 mg PO DAILY 08/21/19 [History]


Acetaminophen Tab [Tylenol] 650 mg PO Q6HR PRN #30 tab 08/31/19 [Rx]


Amoxicillin/Potassium Clav [Augmentin 875-125 Tablet] 1 tab PO Q12HR #20 tab 

08/31/19 [Rx]


HYDROcodone/APAP 5-325MG [[place] 5-325] 1 each PO Q8HR PRN 3 Days #9 tab 08/31/19

[Rx]








Follow up Appointment(s)/Referral(s): 


Terrence Ramirez MD [STAFF PHYSICIAN] - As Needed


(Please call if shortness of breath occurs to schedule PleurX cath placement


office closed at time of discharge please call to make appointment)


Imelda Kinney MD [Primary Care Provider] - 09/03/19 2:45 pm ([place] office)


Raquel Walker MD [STAFF PHYSICIAN] - 09/09/19 1:30 pm


Praveen Barrios MD [STAFF PHYSICIAN] - 1 Week


(History of masslike in your abdomen, treated by Dr. Rincon on 10/2018


office closed at time of discharge please call to make appointment)


Patient Instructions/Handouts:  Pleural Effusion (DC)


Discharge Disposition: HOME SELF-CARE

## 2019-09-13 ENCOUNTER — HOSPITAL ENCOUNTER (OUTPATIENT)
Dept: HOSPITAL 47 - OR | Age: 79
LOS: 1 days | Discharge: HOME HEALTH | End: 2019-09-14
Attending: SURGERY
Payer: MEDICARE

## 2019-09-13 VITALS — BODY MASS INDEX: 31.4 KG/M2

## 2019-09-13 DIAGNOSIS — Z97.2: ICD-10-CM

## 2019-09-13 DIAGNOSIS — Z82.49: ICD-10-CM

## 2019-09-13 DIAGNOSIS — Z79.899: ICD-10-CM

## 2019-09-13 DIAGNOSIS — H91.90: ICD-10-CM

## 2019-09-13 DIAGNOSIS — Z87.19: ICD-10-CM

## 2019-09-13 DIAGNOSIS — K57.90: ICD-10-CM

## 2019-09-13 DIAGNOSIS — E78.5: ICD-10-CM

## 2019-09-13 DIAGNOSIS — Z87.442: ICD-10-CM

## 2019-09-13 DIAGNOSIS — J94.8: ICD-10-CM

## 2019-09-13 DIAGNOSIS — Z86.73: ICD-10-CM

## 2019-09-13 DIAGNOSIS — Z79.891: ICD-10-CM

## 2019-09-13 DIAGNOSIS — H54.7: ICD-10-CM

## 2019-09-13 DIAGNOSIS — Z90.49: ICD-10-CM

## 2019-09-13 DIAGNOSIS — Z87.891: ICD-10-CM

## 2019-09-13 DIAGNOSIS — I10: ICD-10-CM

## 2019-09-13 DIAGNOSIS — H54.40: ICD-10-CM

## 2019-09-13 DIAGNOSIS — J90: Primary | ICD-10-CM

## 2019-09-13 PROCEDURE — 88341 IMHCHEM/IMCYTCHM EA ADD ANTB: CPT

## 2019-09-13 PROCEDURE — 87205 SMEAR GRAM STAIN: CPT

## 2019-09-13 PROCEDURE — 87070 CULTURE OTHR SPECIMN AEROBIC: CPT

## 2019-09-13 PROCEDURE — 71045 X-RAY EXAM CHEST 1 VIEW: CPT

## 2019-09-13 PROCEDURE — 87102 FUNGUS ISOLATION CULTURE: CPT

## 2019-09-13 PROCEDURE — 32608 THORACOSCOPY W/BX NODULE: CPT

## 2019-09-13 PROCEDURE — 88305 TISSUE EXAM BY PATHOLOGIST: CPT

## 2019-09-13 PROCEDURE — 32550 INSERT PLEURAL CATH: CPT

## 2019-09-13 PROCEDURE — 88342 IMHCHEM/IMCYTCHM 1ST ANTB: CPT

## 2019-09-13 PROCEDURE — 88108 CYTOPATH CONCENTRATE TECH: CPT

## 2019-09-13 PROCEDURE — 71046 X-RAY EXAM CHEST 2 VIEWS: CPT

## 2019-09-13 PROCEDURE — 87075 CULTR BACTERIA EXCEPT BLOOD: CPT

## 2019-09-13 RX ADMIN — HEPARIN SODIUM SCH UNIT: 5000 INJECTION, SOLUTION INTRAVENOUS; SUBCUTANEOUS at 15:44

## 2019-09-13 RX ADMIN — HEPARIN SODIUM SCH UNIT: 5000 INJECTION, SOLUTION INTRAVENOUS; SUBCUTANEOUS at 23:14

## 2019-09-13 RX ADMIN — KETOROLAC TROMETHAMINE SCH MG: 30 INJECTION, SOLUTION INTRAMUSCULAR at 14:08

## 2019-09-13 RX ADMIN — HYDROMORPHONE HYDROCHLORIDE ONE MG: 1 INJECTION, SOLUTION INTRAMUSCULAR; INTRAVENOUS; SUBCUTANEOUS at 10:02

## 2019-09-13 RX ADMIN — KETOROLAC TROMETHAMINE SCH MG: 30 INJECTION, SOLUTION INTRAMUSCULAR at 09:46

## 2019-09-13 RX ADMIN — HYDROMORPHONE HYDROCHLORIDE ONE MG: 1 INJECTION, SOLUTION INTRAMUSCULAR; INTRAVENOUS; SUBCUTANEOUS at 09:46

## 2019-09-13 RX ADMIN — KETOROLAC TROMETHAMINE SCH MG: 30 INJECTION, SOLUTION INTRAMUSCULAR at 23:14

## 2019-09-13 RX ADMIN — KETOROLAC TROMETHAMINE SCH MG: 30 INJECTION, SOLUTION INTRAMUSCULAR at 17:09

## 2019-09-13 NOTE — OP
OPERATIVE REPORT



DATE OF SURGERY:

09/13/2019



PREOPERATIVE DIAGNOSIS:

Recurrent left pleural effusion.



POSTOPERATIVE DIAGNOSIS:

Recurrent left pleural effusion.



PROCEDURES:

1. Left VATS pleural biopsy.

2. Placement of left PleurX catheter.



SURGEON:

Obdulio Glass MD.



ASSISTANTS:

None.



ANESTHESIA:

General.



SPECIMENS:

1. Pleural fluid.

2. Parietal pleura.



COMPLICATIONS:

None.



ESTIMATED BLOOD LOSS:

Minimal.



INDICATION:

The patient is a 79-year-old male with a history of CVA, hypertension, GI bleed 
and

tobacco use who was found to have a left pleural effusion several

weeks ago.  A left thoracentesis was performed with nearly 2 L of fluid drained.

Follow-up imaging studies revealed a trapped lung and the patient had a pigtail 
drain

placed with removal another 1.4 L of fluid shortly thereafter.  Follow-up 
imaging

studies now reveal recurrence of this pleural fluid.  For this reason, a left 
VATS with

pleural biopsy along with placement of PleurX catheter was recommended.  The 
risks,

benefits and alternatives to these procedures were discussed with the patient 
and his

wife.  All of their questions were answered.  Consent was obtained.



FINDINGS:

There was approximately 2 L of greenish-colored pleural fluid drained today.  
There was

evidence of trapped lung.  There was no studding on the chest wall.



PROCEDURE IN DETAIL:

The patient was taken to the operating room and placed supine on the operating 
table.

After the induction of general anesthesia using a single-lumen endotracheal 
tube, the

patient was placed in the right lateral decubitus position with the left side 
up.  All

pressure points were well padded.  The left chest and flank were then prepped 
and

draped in the usual sterile fashion.  An incision was made at approximately the 
seventh

intercostal space along the posterior axillary line.  Dissection was carried 
down

through the subcutaneous tissue and the left pleural space was entered easily.  
A

thoracoscope was introduced.  There was a large space secondary to trapped lung.

Approximately 2 L of greenish pleural fluid was encountered and drained.  A 
portion of

this fluid was sent to Microbiology.  The remainder was sent to Cytology.  A 
second

working port was then placed under direct vision.  The lung was densely adhered 
to the

chest wall in multiple areas resulting in trapped lung.  I did not see any 
obvious

studding on the chest wall.  A portion of the parietal pleura was biopsied and 
sent to

Microbiology.  A second portion was sent to Pathology.



Attention was then turned to placing of the PleurX catheter.  A finder needle 
was

introduced into the chest cavity again under direct vision.  A guidewire was 
then

placed.  The skin and subcutaneous tissue were infiltrated with local 
anesthetic.  A

counter incision was created anteriorly.  The catheter was then tunneled in the

subcutaneous tissue. Using standard Seldinger technique, a dilator was placed 
over the

guidewire.  A breakaway sheath was then placed and the PleurX catheter was 
introduced

into the left pleural cavity.  It was easily visualized and appeared to be 
sitting in an

optimal position.  The catheter was secured to the skin using a suture.  The 
wounds

were then closed in layers.  Sterile dressings were applied.  The patient 
appeared to

tolerate the procedure well.  There were no immediate complications.  He was 
extubated

at the completion of the case and returned to the recovery room in stable 
condition.





MMODL / IJN: [id number] / Job#: [id number]

MTDD

## 2019-09-13 NOTE — XR
EXAMINATION TYPE: XR chest 1V portable

 

DATE OF EXAM: 9/13/2019

 

COMPARISON: 8/31/2019 and 9/9/2019

 

HISTORY: Status post lung biopsy

 

TECHNIQUE: Single frontal view of the chest is obtained.

 

FINDINGS:  There is a known left-sided hydropneumothorax with left thoracostomy tube. There appears t
o be redistribution an slight increase of the left pneumothorax with the apical component laterally m
easuring only 6 mm and medially measuring approximately 1.6 cm with the midportion of the lower lung 
measuring 2.9 cm and lower portion measuring 2.0 cm. Multifocal opacities are seen around the left hi
lum. Right lung remains well aerated other than trace right pleural effusion. Cardiomediastinal silho
uette is stable. Subcutaneous emphysema is improved. Diffuse osseous demineralization is seen.

 

IMPRESSION:  Slight increase in redistribution of the left hydropneumothorax however thoracostomy tub
e is in place. Assessment for air leak and continued follow-up is recommended. Trace right pleural ef
fusion.

## 2019-09-14 VITALS
SYSTOLIC BLOOD PRESSURE: 116 MMHG | DIASTOLIC BLOOD PRESSURE: 77 MMHG | TEMPERATURE: 98.4 F | RESPIRATION RATE: 12 BRPM | HEART RATE: 98 BPM

## 2019-09-14 RX ADMIN — HEPARIN SODIUM SCH: 5000 INJECTION, SOLUTION INTRAVENOUS; SUBCUTANEOUS at 08:14

## 2019-09-14 RX ADMIN — KETOROLAC TROMETHAMINE SCH MG: 30 INJECTION, SOLUTION INTRAMUSCULAR at 05:24

## 2019-09-14 NOTE — XR
EXAMINATION TYPE: XR chest 2V

 

DATE OF EXAM: 9/14/2019

 

HISTORY: Pleural Catheter placement.

 

REFERENCE: Previous study dated 9/13/2019.

 

FINDINGS: A left-sided thoracotomy tube is in place. Subcutaneous emphysema on the left. There is imp
roving left-sided pneumothorax. There is a small pleural effusion on the left. The heart is mildly en
larged. The right lung is clear.

 

IMPRESSION: 

 

SLIGHT IMPROVEMENT IN THE PATIENT'S LEFT-SIDED HYDROPNEUMOTHORAX 5

## 2019-09-14 NOTE — P.DS
Providers


Expected date of discharge: 09/14/19


Attending physician: 


Obdulio Glass





Primary care physician: 


Imelda Kinney





[facility] Course: 





FINAL DIAGNOSIS: 


1.  Recurrent left-sided pleural effusion 


2.  History of hypertension


3.  History of hyperlipidemia


4.  History of CVA





PRINCIPAL PROCEDURE: 


1.  Left-sided video-assisted thoracoscopy with biopsy


2.  Placement of left-sided Pleurx catheter





HISTORY OF PRESENT ILLNESS: This is a 79-year-old gentleman who follows on an 

outpatient basis with Dr. Kinney.  He was recently admitted to [facility]

for progressive shortness of breath and left upper quadrant pain.  Diagnostic 

studies completed at that time indicated a moderate left-sided pleural effusion 

which was initially treated with left sided thoracentesis, removal of 2 L of 

greenish colored pleural fluid.  Subsequently he developed a hydropneumothorax 

which was treated with ultrasound-guided pigtail catheter insertion by 

interventional radiology.  The catheter continued to drain until removal.  

Cardiothoracic surgery was consulted during that admission for treatment 

recommendations, Dr. Glass saw the patient in consultation.  He was recommended 

for outpatient follow-up and was stable at discharge.  Eventually his symptoms 

returned and he was recommended to undergo Pleurx catheter placement to allow 

for pleural drainage at home.  In addition, we were asked by pulmonology to 

obtain biopsies for a tissue diagnosis for further treatment.  The usual 

perioperative course was discussed in detail with the patient and his family, 

all risks and benefits were explained, all questions were answered, and consent 

was obtained to proceed with surgery.  Surgery was scheduled at the earliest 

possible date.





HOSPITAL COURSE: The patient was brought to the hospital on 09/13/2019, taken to

the preoperative area, prepared in the usual fashion, and subsequently taken to 

the operating room where Dr. Glass who performed a left sided video assisted 

thoracoscopy with biopsy, and placement of left-sided Pleurx catheter. Upon 

completion of surgery the patient was extubated and taken to the recovery room 

where he was recovered and monitored hemodynamically.  He was eventually 

admitted to [address] [place] [address] for further monitoring and rehabilitation.  He had no 

postoperative complications, he was tolerating oral diet, pain was controlled, 

and he was ambulating in the hallway.  The Pleurx catheter was drained on postop

day #1 to allow teaching with the patient and his family.  All questions were 

answered.  He was ready to be discharged to home with home care on postoperative

day #1.  He received written and verbal instruction regarding his medications, 

activity restrictions, signs and symptoms requiring physician notification, and 

follow-up appointments.





COMPLICATIONS: The patient experienced no postoperative complications.








Patient Condition at Discharge: Stable





Plan - Discharge Summary


Discharge Rx Participant: No


New Discharge Prescriptions: 


New


   Ibuprofen 400 mg PO Q6HR PRN #30 tablet


     PRN Reason: Pain





Continue


   OXcarbazepine [Trileptal] 150 mg PO HS


   Metoprolol Succinate [Toprol Xl] 50 mg PO DAILY


   hydrALAZINE HCL [Apresoline] 25 mg PO DAILY


   Acetaminophen Tab [Tylenol] 650 mg PO Q6HR PRN #30 tab


     PRN Reason: Fever and/ or MILD Pain


   HYDROcodone/APAP 5-325MG [Norco 5-325] 1 each PO Q8HR PRN 3 Days #9 tab


     PRN Reason: MODERATE Pain





Discontinued


   Amoxicillin/Potassium Clav [Augmentin 875-125 Tablet] 1 tab PO Q12HR #20 tab


Discharge Medication List





Metoprolol Succinate [Toprol Xl] 50 mg PO DAILY 08/21/19 [History]


OXcarbazepine [Trileptal] 150 mg PO HS 08/21/19 [History]


hydrALAZINE HCL [Apresoline] 25 mg PO DAILY 08/21/19 [History]


Acetaminophen Tab [Tylenol] 650 mg PO Q6HR PRN #30 tab 08/31/19 [Rx]


HYDROcodone/APAP 5-325MG [[place] 5-325] 1 each PO Q8HR PRN 3 Days #9 tab 08/31/19

[Rx]


Ibuprofen 400 mg PO Q6HR PRN #30 tablet 09/14/19 [Rx]








Follow up Appointment(s)/Referral(s): 


[facility], [NON-STAFF] - 


Zeinab Castillo NPC [Nurse Practitioner] - 10/02/19 3:00 pm


Obdulio Glass MD [STAFF PHYSICIAN] - As Needed (Please call for appointment when 

ready for PleurX catheter removal)


Imelda Kinney MD [Primary Care Provider] - As Needed


Activity/Diet/Wound Care/Special Instructions: 


1.  Home Care is ordered, they will obtain new bottles.


2.  May shower after 24 hours, no tub baths/hot tubs.


3.  Do not drain more than 1 liter or 1000 mL in 24 hours.


4.  New drainage bottle needed with each drainage.


5.  Drainage frequency dictated by patient symptoms, may be every day, every 

other day, weekly, or however often the patient is symptomatic.


6.  Please notify NP or office if temperature >101F, excessive pain at insertion

site, drainage consistency changes to cloudy or smells bad, catheter falls out, 

or anything else that concerns you.


7.  Contact surgery office with weekly drainage amounts.  May fax the amounts.


8.  Once drainage is less than 50 mL three times in a row, notify the surgery 

office for possible removal.





Surgery office: [phone], fax [phone]


NP: Chata [phone], Manoj [phone]





Discharge Disposition: HOME WITH HOME HEALTH SERVICES

## 2019-09-22 ENCOUNTER — HOSPITAL ENCOUNTER (EMERGENCY)
Dept: HOSPITAL 47 - EC | Age: 79
Discharge: HOME | End: 2019-09-22
Payer: MEDICARE

## 2019-09-22 VITALS — RESPIRATION RATE: 16 BRPM

## 2019-09-22 VITALS — HEART RATE: 79 BPM | DIASTOLIC BLOOD PRESSURE: 94 MMHG | SYSTOLIC BLOOD PRESSURE: 134 MMHG

## 2019-09-22 VITALS — TEMPERATURE: 98 F

## 2019-09-22 DIAGNOSIS — Z86.73: ICD-10-CM

## 2019-09-22 DIAGNOSIS — I10: ICD-10-CM

## 2019-09-22 DIAGNOSIS — E78.5: ICD-10-CM

## 2019-09-22 DIAGNOSIS — Z79.899: ICD-10-CM

## 2019-09-22 DIAGNOSIS — Z96.89: ICD-10-CM

## 2019-09-22 DIAGNOSIS — Z82.49: ICD-10-CM

## 2019-09-22 DIAGNOSIS — Z98.890: ICD-10-CM

## 2019-09-22 DIAGNOSIS — R07.89: Primary | ICD-10-CM

## 2019-09-22 DIAGNOSIS — Z97.4: ICD-10-CM

## 2019-09-22 DIAGNOSIS — H91.93: ICD-10-CM

## 2019-09-22 PROCEDURE — 96375 TX/PRO/DX INJ NEW DRUG ADDON: CPT

## 2019-09-22 PROCEDURE — 71046 X-RAY EXAM CHEST 2 VIEWS: CPT

## 2019-09-22 PROCEDURE — 99283 EMERGENCY DEPT VISIT LOW MDM: CPT

## 2019-09-22 PROCEDURE — 96374 THER/PROPH/DIAG INJ IV PUSH: CPT
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
## 2019-09-22 NOTE — XR
EXAMINATION TYPE: XR chest 2V

 

DATE OF EXAM: 9/22/2019

 

HISTORY: Difficulty breathing .

 

REFERENCE: Previous study dated 9/14/2019.

 

FINDINGS: There is a left-sided hydropneumothorax. This has increased slightly from the previous exam
. Distant from the sidewall previously was 15.9 mm and today is 34.7 mm. There is increasing fluid on
 the left. There is resolving subcutaneous emphysema on the left. The right lung is clear. Heart size
 upper limits of normal.

 

IMPRESSION: 

 

WORSENING LEFT-SIDED HYDROPNEUMOTHORAX.

## 2019-10-12 ENCOUNTER — HOSPITAL ENCOUNTER (OUTPATIENT)
Dept: HOSPITAL 47 - RADPETMAIN | Age: 79
Discharge: HOME | End: 2019-10-12
Attending: INTERNAL MEDICINE
Payer: MEDICARE

## 2019-10-12 DIAGNOSIS — R91.1: ICD-10-CM

## 2019-10-12 DIAGNOSIS — C80.1: Primary | ICD-10-CM

## 2019-10-12 PROCEDURE — 78815 PET IMAGE W/CT SKULL-THIGH: CPT

## 2019-10-15 ENCOUNTER — HOSPITAL ENCOUNTER (INPATIENT)
Dept: HOSPITAL 47 - EC | Age: 79
LOS: 2 days | Discharge: HOME | DRG: 199 | End: 2019-10-17
Attending: FAMILY MEDICINE | Admitting: FAMILY MEDICINE
Payer: MEDICARE

## 2019-10-15 VITALS — BODY MASS INDEX: 31.3 KG/M2

## 2019-10-15 DIAGNOSIS — Z98.42: ICD-10-CM

## 2019-10-15 DIAGNOSIS — K59.03: ICD-10-CM

## 2019-10-15 DIAGNOSIS — Z97.4: ICD-10-CM

## 2019-10-15 DIAGNOSIS — G89.3: ICD-10-CM

## 2019-10-15 DIAGNOSIS — Z86.73: ICD-10-CM

## 2019-10-15 DIAGNOSIS — I27.82: ICD-10-CM

## 2019-10-15 DIAGNOSIS — Z87.19: ICD-10-CM

## 2019-10-15 DIAGNOSIS — C79.51: ICD-10-CM

## 2019-10-15 DIAGNOSIS — C34.92: ICD-10-CM

## 2019-10-15 DIAGNOSIS — I10: ICD-10-CM

## 2019-10-15 DIAGNOSIS — Z82.49: ICD-10-CM

## 2019-10-15 DIAGNOSIS — I31.3: ICD-10-CM

## 2019-10-15 DIAGNOSIS — T40.605A: ICD-10-CM

## 2019-10-15 DIAGNOSIS — H91.90: ICD-10-CM

## 2019-10-15 DIAGNOSIS — Z79.891: ICD-10-CM

## 2019-10-15 DIAGNOSIS — Z87.442: ICD-10-CM

## 2019-10-15 DIAGNOSIS — I21.A1: ICD-10-CM

## 2019-10-15 DIAGNOSIS — J91.0: ICD-10-CM

## 2019-10-15 DIAGNOSIS — E78.5: ICD-10-CM

## 2019-10-15 DIAGNOSIS — J93.9: Primary | ICD-10-CM

## 2019-10-15 DIAGNOSIS — Z90.49: ICD-10-CM

## 2019-10-15 DIAGNOSIS — Z98.890: ICD-10-CM

## 2019-10-15 DIAGNOSIS — Z79.899: ICD-10-CM

## 2019-10-15 DIAGNOSIS — H54.61: ICD-10-CM

## 2019-10-15 DIAGNOSIS — D50.9: ICD-10-CM

## 2019-10-15 DIAGNOSIS — Z87.828: ICD-10-CM

## 2019-10-15 LAB
ALBUMIN SERPL-MCNC: 3.8 G/DL (ref 3.5–5)
ALP SERPL-CCNC: 330 U/L (ref 38–126)
ALT SERPL-CCNC: 18 U/L (ref 21–72)
ANION GAP SERPL CALC-SCNC: 10 MMOL/L
APTT BLD: 21.9 SEC (ref 22–30)
AST SERPL-CCNC: 26 U/L (ref 17–59)
BASOPHILS # BLD AUTO: 0.1 K/UL (ref 0–0.2)
BASOPHILS NFR BLD AUTO: 1 %
BUN SERPL-SCNC: 18 MG/DL (ref 9–20)
CALCIUM SPEC-MCNC: 9.9 MG/DL (ref 8.4–10.2)
CHLORIDE SERPL-SCNC: 105 MMOL/L (ref 98–107)
CO2 SERPL-SCNC: 24 MMOL/L (ref 22–30)
EOSINOPHIL # BLD AUTO: 0.3 K/UL (ref 0–0.7)
EOSINOPHIL NFR BLD AUTO: 3 %
ERYTHROCYTE [DISTWIDTH] IN BLOOD BY AUTOMATED COUNT: 4.49 M/UL (ref 4.3–5.9)
ERYTHROCYTE [DISTWIDTH] IN BLOOD: 14.5 % (ref 11.5–15.5)
GLUCOSE SERPL-MCNC: 106 MG/DL (ref 74–99)
HCT VFR BLD AUTO: 41.1 % (ref 39–53)
HGB BLD-MCNC: 13.3 GM/DL (ref 13–17.5)
INR PPP: 1 (ref ?–1.2)
LYMPHOCYTES # SPEC AUTO: 0.7 K/UL (ref 1–4.8)
LYMPHOCYTES NFR SPEC AUTO: 8 %
MCH RBC QN AUTO: 29.7 PG (ref 25–35)
MCHC RBC AUTO-ENTMCNC: 32.5 G/DL (ref 31–37)
MCV RBC AUTO: 91.4 FL (ref 80–100)
MONOCYTES # BLD AUTO: 0.5 K/UL (ref 0–1)
MONOCYTES NFR BLD AUTO: 6 %
NEUTROPHILS # BLD AUTO: 6.6 K/UL (ref 1.3–7.7)
NEUTROPHILS NFR BLD AUTO: 79 %
PLATELET # BLD AUTO: 204 K/UL (ref 150–450)
POTASSIUM SERPL-SCNC: 4.1 MMOL/L (ref 3.5–5.1)
PROT SERPL-MCNC: 7.4 G/DL (ref 6.3–8.2)
PT BLD: 10.4 SEC (ref 9–12)
SODIUM SERPL-SCNC: 139 MMOL/L (ref 137–145)
WBC # BLD AUTO: 8.4 K/UL (ref 3.8–10.6)

## 2019-10-15 PROCEDURE — 99285 EMERGENCY DEPT VISIT HI MDM: CPT

## 2019-10-15 PROCEDURE — 93306 TTE W/DOPPLER COMPLETE: CPT

## 2019-10-15 PROCEDURE — 85610 PROTHROMBIN TIME: CPT

## 2019-10-15 PROCEDURE — 85730 THROMBOPLASTIN TIME PARTIAL: CPT

## 2019-10-15 PROCEDURE — 83880 ASSAY OF NATRIURETIC PEPTIDE: CPT

## 2019-10-15 PROCEDURE — 96375 TX/PRO/DX INJ NEW DRUG ADDON: CPT

## 2019-10-15 PROCEDURE — 36415 COLL VENOUS BLD VENIPUNCTURE: CPT

## 2019-10-15 PROCEDURE — 80053 COMPREHEN METABOLIC PANEL: CPT

## 2019-10-15 PROCEDURE — 85025 COMPLETE CBC W/AUTO DIFF WBC: CPT

## 2019-10-15 PROCEDURE — 84484 ASSAY OF TROPONIN QUANT: CPT

## 2019-10-15 PROCEDURE — 71046 X-RAY EXAM CHEST 2 VIEWS: CPT

## 2019-10-15 PROCEDURE — 71275 CT ANGIOGRAPHY CHEST: CPT

## 2019-10-15 PROCEDURE — 96374 THER/PROPH/DIAG INJ IV PUSH: CPT

## 2019-10-15 RX ADMIN — HYDROMORPHONE HYDROCHLORIDE PRN MG: 1 INJECTION, SOLUTION INTRAMUSCULAR; INTRAVENOUS; SUBCUTANEOUS at 21:20
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
## 2019-10-15 NOTE — XR
EXAMINATION TYPE: XR chest 2V

 

DATE OF EXAM: 10/15/2019

 

COMPARISON: 9/22/2019

 

TECHNIQUE: PA and lateral views submitted.

 

HISTORY: Difficulty breathing

 

FINDINGS:

There is a left-sided hydropneumothorax. This has increased slightly from the previous exam. Appears 
stable. The amount of fluid and consolidation at the left lung base is stable. Underlying COPD and ch
ronic interstitial lung disease suspected. Diffuse osteopenia and arthropathy of the shoulders. Heart
 size stable. Tubing overlying the lower chest is noted and likely represents the patient pleural ronald
in. Correlate clinically for confirmation. Hypertrophic and degenerative change of the spine.

 

IMPRESSION:

1. Stable left-sided hydropneumothorax estimated at 50% to 60%.

## 2019-10-16 RX ADMIN — HYDROMORPHONE HYDROCHLORIDE PRN MG: 1 INJECTION, SOLUTION INTRAMUSCULAR; INTRAVENOUS; SUBCUTANEOUS at 19:50

## 2019-10-16 RX ADMIN — HYDROMORPHONE HYDROCHLORIDE PRN MG: 1 INJECTION, SOLUTION INTRAMUSCULAR; INTRAVENOUS; SUBCUTANEOUS at 09:11

## 2019-10-16 RX ADMIN — HYDROMORPHONE HYDROCHLORIDE PRN MG: 1 INJECTION, SOLUTION INTRAMUSCULAR; INTRAVENOUS; SUBCUTANEOUS at 12:32

## 2019-10-16 RX ADMIN — HYDROMORPHONE HYDROCHLORIDE PRN MG: 1 INJECTION, SOLUTION INTRAMUSCULAR; INTRAVENOUS; SUBCUTANEOUS at 23:35

## 2019-10-16 RX ADMIN — HYDROMORPHONE HYDROCHLORIDE PRN MG: 1 INJECTION, SOLUTION INTRAMUSCULAR; INTRAVENOUS; SUBCUTANEOUS at 15:59

## 2019-10-16 NOTE — PN
PROGRESS NOTE



CHIEF COMPLAINT:

Chest pain.



HISTORY OF PRESENT ILLNESS:

This gentleman is doing well and he has not had any further pain.  He has had no

significant change.  His vital signs are rhythm disturbances.



PHYSICAL EXAMINATION:

His chest is clear.  Breath sounds are slightly less on the left.  Cardiac exam is

normal.  The abdomen is soft and nontender.



IMPRESSION:

1. Chest pain.

2. Probable myocardial infarction.  He does have ST-segment elevation in the early

    precordial leads.

3. Metastatic adenocarcinoma, primary unknown.



PLAN:

Await recommendations of Cardiology.  He will also be seen by Oncology.





MMODL / IJN: [id number] / Job#: [id number]

## 2019-10-16 NOTE — HP
HISTORY AND PHYSICAL



CHIEF COMPLAINT:

Chest pain.



HISTORY OF PRESENT ILLNESS:

This is another admission for this 79-year-old white male who has been in excellent

health until recently when he was found to have a left chest neoplasm.  Apparently, it

is an adenocarcinoma with primary being unknown.  He has had problems with recurrent

effusion and currently has a device in place to drain his effusion.  He was at home

when he suddenly developed substernal discomfort which he describes as a pressure

squeezing.  He was not particularly short of breath.  He was not diaphoretic, became

quite short of breath.  He came to emergency room where his troponins was found to be

elevated and he had severe and minor changes in the EKG.  He has never had a history of

heart disease.  He has had no syncope.



REVIEW OF SYSTEMS:

He has had no neurologic problems, nausea, diaphoresis, cough, hemoptysis, abdominal

pain, melena, hematochezia, hematemesis, renal failure, etc.



Past medical history, family history personal and social histories can be found in his

prior hospital records.



He is not allergic to any medication and he currently is taking trazodone,

atorvastatin, ketorolac, Vicodin, oxy carbamazepine.  The remainder of his history is

essentially unremarkable and detailed in the rest of his hospital records.



PHYSICAL EXAMINATION:

Blood pressure 118/76, pulse 70, respirations of 18, he is afebrile.

In general, he appeared to be well developed, well nourished, and somewhat

uncomfortable.  Skin color is normal, skin is warm and dry. Lymph nodes are not

enlarged.  Head, ears, eyes, nose, mouth, and throat were normal.  Neck veins are not

distended.  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 Carotids

normal.  Neck veins are not distended.  Chest is clear.  Cardiac exam is normal and the

abdomen is soft and non-tender.  Left thoracic drainage device is in the left chest.

Extremities are normal.  Neurologically he is intact.



IMPRESSION:

1. Chest pain.

2. Elevated troponin.

3. Probable N-STEMI or STEMI.

4. 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



PLAN:

1. Bed rest.

2. IV fluids.

3. Serial EKGs and enzymes.

4. Consult Cardiology.

5. Consult with Oncology.





MMODL / IJN: [id number] / Job#: [id number]

## 2019-10-16 NOTE — ECHOF
Referral Reason:MI, Ca, elev. BNP



MEASUREMENTS

--------

HEIGHT: 180.3 cm

WEIGHT: 98.9 kg

BP: 

MV E Vinnie:   0.50 m/s

MV DecT:   182 ms

MV A Vinnie:   0.90 m/s

MV E/A Ratio:   0.55 

RAP:   5.00 mmHg

RVSP:   7.97 mmHg







FINDINGS

--------

Sinus rhythm.

This was a technically difficult study with suboptimal views.   Limited Study   Pt has drainage tube 
on his left side.

Grossly normal LV size and systolic function. Unable to comment on regional wall motion.

The RV was not well visualized.

The left atrium was not well visualized.

The right atrium was not well visualized.

Lumason used

The aortic valve was not well visualized.

The mitral valve was not well visualized.

The tricuspid valve was not well visualized.

The pulmonic valve was not well visualized.

There is a small, generalized pericardial effusion present.



CONCLUSIONS

--------

1. Sinus rhythm.

2. This was a technically difficult study with suboptimal views.

3. Limited Study

4. Pt has drainage tube on his left side.

5. Grossly normal LV size and systolic function. Unable to comment on regional wall motion.

6. The RV was not well visualized.

7. The left atrium was not well visualized.

8. The right atrium was not well visualized.

9. Lumason used

10. The aortic valve was not well visualized.

11. The mitral valve was not well visualized.

12. The tricuspid valve was not well visualized.

13. The pulmonic valve was not well visualized.

14. There is a small, generalized pericardial effusion present.





SONOGRAPHER: Chata Stone RDCS
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
## 2019-10-16 NOTE — CT
EXAMINATION TYPE: CT angio chest

 

DATE OF EXAM: 10/16/2019

 

COMPARISON: PET/CT from 4 days ago. Chest CT from August 23, 2019.

 

HISTORY: Left sided chest pain with dyspnea; history of cancer unknown primary.

 

CT DLP: 513.9 mGycm. Automated Exposure Control for Dose Reduction was Utilized.

 

 

CONTRAST: 

CTA scan of the thorax is performed with IV Contrast, patient injected with 100 mL of Isovue 370, pul
monary embolism protocol.  MIP Images are created on CT scanner and reviewed.

 

FINDINGS:

 

LUNGS: There is redemonstration of left-sided pleural drainage catheter with increasing now moderate 
sized dependent left pleural fluid. There is fairly large left apical pneumothorax not significantly 
changed from prior. There is left infrahilar masslike consolidation and scattered peripheral atelecta
sis and/or consolidation in the left lung. Respiratory motion artifact degradation is present. Mild r
eticular opacities throughout the mid to lower right lung are now seen on current study. Trace right 
pleural effusion.

 

MEDIASTINUM: There is satisfactory enhancement of the pulmonary artery and its branches, there is seg
mental right lower lobe thrombus medial branch with subsegmental extension beginning on axial image 8
8 extending through images 92.  There are persistent suspicious thoracic lymph nodes for reference en
larged paracarinal lymph node axial image 54.. Heart size upper limits of normal. No Pericardial effu
lane is seen. Ascending aortic aneurysm up to 4.5 cm axial image 68.

 

OTHER: Sclerotic lesions consistent with known osseous metastatic disease redemonstrated. Cholecystec
jeovany clips. Partial visualization of simple appearing thin-walled renal cysts bilaterally. There is 1
.3 cm hypodense lesion left hepatic lobe presumed simple thin-walled cyst redemonstrated.

 

IMPRESSION: There is small right lower lobe segmental pulmonary embolism with subsegmental extension.
 Critical result communicated to patient's nurse via telephone at time of dictation. Other findings r
edemonstrated are not significantly changed from PET CT 4 days earlier.
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
## 2019-10-16 NOTE — PE
EXAMINATION TYPE: PET CT fusion skull to thigh

 

DATE OF EXAM: 10/12/2019

 

COMPARISON: Chest CT August 23, 2019. CT abdomen and pelvis September 11, 2018.

 

HISTORY: Unknown primary cancer.   

 

TECHNIQUE:  Following the intravenous administration of 11.84 mCi of F-18 FDG, whole body images are 
performed from the skull base to the midthigh.  Images are reviewed on the computer in the coronal, a
xial, and sagittal planes.  Reconstructed rotating images are created on independent workstation and 
reviewed on the computer.   A noncontrast CT is performed in conjunction with the PET scan.

 

SCAN: Initial Scan

 

FINDINGS: 

 

SKULL BASE AND NECK:  No areas of suspicious hypermetabolic uptake

 

CHEST, MEDIASTINUM, AND HILAR REGION: There is small left pleural effusion with pleural drainage cath
eter in place. There is central hypermetabolic left infrahilar mass or neoplasm axial image 96 with m
ax SUV of 9.75 measuring roughly 5.1 x 3.9 cm. There is probable left hilar adenopathy with hypermeta
bolic focus corresponding to subcentimeter lymph node axial image 78, max SUV is 3.64. There is possi
ble right hilar adenopathy axial image 90 with subcentimeter hypermetabolic focus, max SUV is 3.24. A
dditional prominent but subcentimeter pericarinal and AP window lymph nodes do not show definitive ab
normal hypermetabolic uptake. Moderate to large size left pneumothorax despite chest tube placement r
emains present. No new mediastinal shift.

 

ABDOMEN AND PELVIS: No areas of suspicious hypermetabolic uptake.

 

OSSEOUS STRUCTURES: Multiple areas of abnormal hypermetabolic uptake. For reference sclerotic left T3
 rib lesion has hypermetabolic uptake image 54. For reference mid thoracic sclerotic lesion axial rios
ge 74 has Max SUV of 5.39. For reference diffuse sclerotic lower thoracic vertebra axial image 100 ha
s maximum SUV of 5.19. For reference lower thoracic vertebra approximately 2017 has maximal issue vie
w of 6.85. For reference right iliac sclerotic lesion at level of ischial tuberosity axial image 193 
has maximum SUV of 8.35. For reference right inferior sclerotic pelvic ramus lesion axial image 237 h
as maximum SUV of 4.23.

 

OTHER CT: Moderate calcified plaque bilateral carotid bulb level. Exaggerated cervical curvature.

 

Coronary artery calcifications present which is noted marked underlying coronary artery disease. Asce
nding aorta aneurysm measures up to 4.6 cm in diameter axial image 85.

 

Cholecystectomy clips are seen. Roughly 5 cm simple appearing cyst lower pole of the right kidney acc
ess was 151. Moderate calcified plaque of the aorta. Diverticula in the left and sigmoid colon with s
utures proximal sigmoid level axial image 205. Prominence of fecal material in the rectum. Prostate g
land mildly enlarged. Moderate narrowing and spurring both hip joints.

 

IMPRESSION: Suspect advanced primary lung carcinoma with diffuse osseous metastatic disease as detail
ed above.

## 2019-10-17 VITALS — HEART RATE: 96 BPM | SYSTOLIC BLOOD PRESSURE: 116 MMHG | TEMPERATURE: 98.7 F | DIASTOLIC BLOOD PRESSURE: 76 MMHG

## 2019-10-17 VITALS — RESPIRATION RATE: 20 BRPM

## 2019-10-17 RX ADMIN — RIVAROXABAN SCH MG: 15 TABLET, FILM COATED ORAL at 17:23

## 2019-10-17 RX ADMIN — HYDROMORPHONE HYDROCHLORIDE PRN MG: 1 INJECTION, SOLUTION INTRAMUSCULAR; INTRAVENOUS; SUBCUTANEOUS at 09:05

## 2019-10-17 RX ADMIN — HYDROMORPHONE HYDROCHLORIDE PRN MG: 1 INJECTION, SOLUTION INTRAMUSCULAR; INTRAVENOUS; SUBCUTANEOUS at 02:42

## 2019-10-17 RX ADMIN — DOCUSATE SODIUM AND SENNOSIDES SCH EACH: 50; 8.6 TABLET ORAL at 17:23

## 2019-10-17 RX ADMIN — HYDROMORPHONE HYDROCHLORIDE PRN MG: 1 INJECTION, SOLUTION INTRAMUSCULAR; INTRAVENOUS; SUBCUTANEOUS at 05:51

## 2019-10-17 RX ADMIN — RIVAROXABAN SCH MG: 15 TABLET, FILM COATED ORAL at 08:29

## 2019-10-17 RX ADMIN — DOCUSATE SODIUM AND SENNOSIDES SCH EACH: 50; 8.6 TABLET ORAL at 12:14

## 2019-10-17 NOTE — PN
PROGRESS NOTE



CHIEF COMPLAINT:

Chest pain.



HISTORY OF PRESENT ILLNESS:

This gentleman is being evaluated by Cardiology.  Troponins have been elevated.  He is

not having any significant pain at this time.  He is not having a lot of shortness of

breath.  He has been seen by Pulmonology.



PHYSICAL EXAMINATION:

Color is good.  Neck veins not distended.  Breath sounds are heard on both sides but

they are greatly diminished on the left.  His cardiac exam is unremarkable.  Abdomen is

soft, nontender.  Extremities:  Normal.



IMPRESSION:

1. Acute myocardial infarction.

2. Carcinoma of the left lung.



PLAN:

Slowly progress activity.  Await for further directions from Cardiology as to whether

or not anything further will be done or regarding discharge.





MMODL / IJN: [id number] / Job#: [id number]

## 2019-10-17 NOTE — P.PN
Subjective


Progress Note Date: 10/17/19


Principal diagnosis: 





Recurrent malignant pleural effusion, chest pain, RLL PE





In follow-up today patient is experiencing some pain when I see him, the pain is

in his left side/chest, pain medications he has been receiving in the hospital 

are treating the pain but he is requiring it as scheduled and not when 

necessary.  Patient has not had a bowel movement since Sunday.  Otherwise, 

patient feels fairly well





Objective





- Vital Signs


Vital signs: 


                                   Vital Signs











Temp  98.7 F   10/17/19 15:38


 


Pulse  96   10/17/19 15:38


 


Resp  20   10/17/19 15:38


 


BP  116/76   10/17/19 15:38


 


Pulse Ox  96   10/17/19 15:38








                                 Intake & Output











 10/16/19 10/17/19 10/17/19





 18:59 06:59 18:59


 


Intake Total 980 990 500


 


Balance 980 990 500


 


Weight 99 kg 94.8 kg 


 


Intake:   


 


  IV  30 


 


    Invasive Line 1  30 


 


  Oral 980 960 500


 


Other:   


 


  Voiding Method  Toilet 


 


  # Voids 2 2 1














- Constitutional


General appearance: Present: average body habitus, cooperative, mild distress





- EENT


Eyes: Present: anicteric sclerae, EOMI


ENT: Present: hearing grossly normal, normal oropharynx





- Respiratory


Respiratory: right: CTA, left: diminished (lateral base)





- Cardiovascular


Rhythm: regular


Heart sounds: normal: S1, S2


Abnormal Heart Sounds: Absent: systolic murmur, diastolic murmur, rub, S3 

Gallop, S4 Gallop, click, other





- Peripheral edema


  ** leg


Peripheral Edema: bilateral: None





- Gastrointestinal


General gastrointestinal: Present: normal bowel sounds, soft.  Absent: absent 

bowel sounds, decreased bowel sounds, distended, hepatomegaly, hyperactive bowel

sounds, organomegaly, rigid, scaphoid, splenomegaly, tenderness, umbilical 

hernia, ventral hernia





- Integumentary


Integumentary: Present: normal





- Neurologic


Neurologic: Present: CNII-XII intact





- Psychiatric


Psychiatric: Present: A&O x's 3, appropriate affect, intact judgment & insight





- Labs


CBC & Chem 7: 


                                 10/15/19 13:14





                                 10/15/19 13:14





Assessment and Plan


(1) Pulmonary embolism


Narrative/Plan: 


RLL PE, pt given treatment dose of lovenox x 1.  He has started loading dose of 

xarelto.  Rx is at his [facility] pharmacy





Current Visit: Yes   Status: Acute   Priority: High   Code(s): I26.99 - OTHER 

PULMONARY EMBOLISM WITHOUT ACUTE COR PULMONALE   SNOMED Code(s): 02866413


   





(2) Adenocarcinoma


Narrative/Plan: 


GI work up was negative.  Dr. Felix reviewed the case with Pathologist and the 

[facility] from pleural biopsy was consistent with GI primary, pancreatic primary 

couldn't be ruled out, they also discussed rare cases of pulmonary carcinoma and

enteric features.  PET scan for staging done and Dr. Felix requested PDL-1, MSI 

and NextGen sequencing on the pleural biopsy.  Pt has f/u on Friday and if his 

pain is controlled he will be discharged and go to that appt, if not, Dr. Felix's staff will reschedule him to next week.  


Current Visit: Yes   Status: Acute   Code(s): C80.1 - MALIGNANT (PRIMARY) 

NEOPLASM, UNSPECIFIED   SNOMED Code(s): 484073677


   





(3) Elevated troponin


Narrative/Plan: 


[place] secondary to PE, Cardiology has seen pt


Current Visit: Yes   Status: Acute   Priority: Medium   Code(s): R79.89 - OTHER 

SPECIFIED ABNORMAL FINDINGS OF BLOOD CHEMISTRY   SNOMED Code(s): 862156143


   





(4) Pneumothorax on left


Narrative/Plan: 


Pt son is managing plurex drain well.  Respiratory status overall stable, he had

it drained yesterday


Current Visit: Yes   Status: Chronic   Priority: Medium   Code(s): J93.9 - 

PNEUMOTHORAX, UNSPECIFIED   SNOMED Code(s): 214774458


   





(5) Pain of metastatic malignancy


Narrative/Plan: 


Patient's pain medications reviewed.  Pain medications have been converted from 

IV dilaudid to fentanyl patch with oral Norco Q4 hours when necessary for 

breakthrough pain.  Pain control will be reevaluated in the a.m.  If adequate 

patient can be prescribed these medications by Dr. Felix.  Patient already had 

Ellis refilled through PCP Dr. Rodriguez so,  he does not need this Rx.  We will 

collaborate with Dr. Rodriguez to have his pain managed through 1 provider.





Reviewed in great detail the importance of prevention and management of 

narcotic-induced constipation.  Patient has been placed on Senokot 2 tabs twice 

a day.  He is also has MiraLAX ordered when necessary, he was encouraged to ask 

for dose of this as he has not had a bowel movement since Monday.  He verbalized

understanding.  This was all reviewed with his wife is well.


Current Visit: Yes   Status: Acute   Priority: High   Code(s): G89.3 - NEOPLASM 

RELATED PAIN (ACUTE) (CHRONIC)   SNOMED Code(s): 123368203
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
## 2019-10-17 NOTE — P.PN
Subjective


Progress Note Date: 10/17/19


Principal diagnosis: 





Dyspnea secondary to suspected primary lung cancer with recurrent left-sided 

pleural effusion and trapped lung.  Status post Pleurx catheter placement.  

Right lower lobe segmental pulmonary emboli.





This is a very pleasant 79-year-old gentleman who follows with Dr. Rodriguez as 

his primary care physician. He has a history of previous CVA, hypertension, 

hyperlipidemia,previous history of GI bleeding,history of nephrolithiasis, and 

recent renal study showing 3 mm nonobstructing calculus in the left lower pole 

of the left kidney.during the study, the patient was noted to have left-sided 

pleural effusion, hence he was advised to have a CT of the chest.this was done 

today on outpatient basis, and there was a significant collapse of the left lung

with near complete filling of the left sided pleural space with massive pleural 

effusion.  He had undergone thoracentesis with 2 L of fluid removed by Dr. Bolanos back in August 2019.  That pathology was negative for malignancy.  He had

recurrence of effusion and had subsequently undergone a Pleurx catheter pl

acement on 09/13/2019 by Dr. Glass.  The fluid drained at that time was positive

for metastatic moderately differentiated adenocarcinoma with GI differentiation.

 He has since been seen by medical oncology.  A PET scan performed on 10/12/2019

revealed a central hypermetabolic left infrahilar mass or neoplasm with a max 

SUV of 9.75 measuring roughly 5.1 x 3.9 cm.  Probable left hilar adenopathy with

hypermetabolic focus corresponding to the subcentimeter lymph node with a max 

SUV of 3.64.  Suspect right hilar adenopathy with a hypermetabolic focus of 3.24

SUV.  There was still a moderate to large sided left pneumothorax despite chest 

tube placement.  There is noted multiple areas of hypermetabolic uptake within 

the osseous structures.  This includes a left T3 rib lesion, mid thoracic sclero

tic lesion, diffuse sclerotic lower thoracic vertebrae lesions.  No mention of 

hypermetabolic uptake in the GI tract.  There is suspected advance primary lung 

cancer with diffuse osseous metastatic disease.





The patient presented here to the emergency room on 10/15/2019 with complaints 

of increasing shortness of breath and generalized pain and weakness.  The Norco 

that he was prescribed at home was not controlling the pain.  He was 

subsequently admitted.  There is concern regarding pneumothorax on the left.  

He's been seen and evaluated by cardiothoracic surgeon.  No evidence of 

pneumothorax again this is the trapped lung that had been seen on previous 

films.  They did go ahead and drain the Pleurx catheter today.  CT angiogram was

performed and he is now found to have a small right lower lobe segmental 

pulmonary embolism with subsegmental extension.  We are consulted for the same. 

He is seen on the selective care unit.  He is currently sitting up in a chair at

the bedside.  He is awake and alert in no acute distress.  He denies any 

worsening shortness of breath, cough or congestion.  He is maintaining O2 

saturation in the mid 90s on 2 L/m per nasal cannula.  He's been afebrile.  

Hemodynamically stable.  White count 8.4.  Hemoglobin 13.3.  Platelet count 

204,000.  Sodium 139.  Potassium 4.1.  Creatinine 0.91.  ProBNP 543.  Troponin 

0.053, 0.046, 0.044.  He had been on Lovenox and now being transitioned to 

Xarelto per oncology.





The patient is seen today in 10/17/2019 follow-up on the selective care unit.  

He is awake and alert in no acute distress.  Currently resting comfortably in 

bed.  He denies any worsening shortness of breath, cough or congestion.  No 

chest pain or palpitations.  Maintaining O2 saturation in the mid 90s on room 

air.  He's afebrile.  Hemodynamically stable.  His left scapula and spine pain 

is better controlled now.  He is on a fentanyl patch.





Objective





- Vital Signs


Vital signs: 


                                   Vital Signs











Temp  98.2 F   10/17/19 12:00


 


Pulse  98   10/17/19 12:00


 


Resp  20   10/17/19 12:00


 


BP  117/71   10/17/19 12:00


 


Pulse Ox  95   10/17/19 12:00








                                 Intake & Output











 10/16/19 10/17/19 10/17/19





 18:59 06:59 18:59


 


Intake Total 980 990 250


 


Balance 980 990 250


 


Weight 99 kg 94.8 kg 


 


Intake:   


 


  IV  30 


 


    Invasive Line 1  30 


 


  Oral 980 960 250


 


Other:   


 


  Voiding Method  Toilet 


 


  # Voids 2 2 1














- Exam





GENERAL EXAM: Alert, active, comfortable in no apparent distress.


HEAD: Normocephalic.


EYES: Normal reaction of pupils, equal size.


NOSE: Clear with pink turbinates.


THROAT: No erythema or exudates.


NECK: No masses, no JVD.


CHEST: No chest wall deformity.  Pleurx catheter to the left chest.


LUNGS: Equal air entry with crackles in the left lung base, diminished.


CVS: S1 and S2 normal with no audible murmur, regular rhythm.


ABDOMEN: No hepatosplenomegaly, normal bowel sounds, no guarding or rigidity.


SPINE: No scoliosis or deformity


SKIN: No rashes


CENTRAL NERVOUS SYSTEM: No focal deficits, tone is normal in all 4 extremities.


EXTREMITIES: There is no peripheral edema.  No clubbing, no cyanosis.  

Peripheral pulses are intact.





- Labs


CBC & Chem 7: 


                                 10/15/19 13:14





                                 10/15/19 13:14





Assessment and Plan


Assessment: 





Impression:





#1 Dyspnea, multifactorial in a patient with a known history of suspected 

primary lung cancer with recurrent left-sided pleural effusion and trapped lung.

 Pleurx catheter remains in place since 09/13/2019 and new evidence of right 

lower lobe segmental pulmonary emboli on CT angiogram.  On Xarelto.





#2 Left pleural fluid from Pleurx catheter placement positive for metastatic 

moderately differentiated adenocarcinoma with gastrointestinal differentiation. 

Recent PET scan reveals suspected primary lung cancer with osseous metastasis.  

No GI uptake noted.





#3 Skeletal pain suspect secondary to osseous metastasis.  Improved on a 

fentanyl patch.





#4 History of iron deficiency anemia.





#5 Previous history of CVA with no residual effects.





#6 Hypertension.





#7 Hyperlipidemia.





#8 History of diverticulitis and GI bleeding requiring a low anterior resection 

10/18/2018.





Plan:





The patient was seen and evaluated by Dr. Walker.  He has been transitioned to

Xarelto for the PE.  He is stable from the pulmonary standpoint.  On room air.  

He could be discharged home and keep his appointment with Dr. Felix tomorrow.





I, the cosigning physician, performed a history & physical examination of the 

patient. Lungs sounds with crackles/rhonchi in the left base, diminished..  

Maintaining good O2 saturations in the 90s on room air.  I discussed the 

assessment and plan of care with my nurse practitioner, Zeinab Castillo. I attest to 

the above note as dictated by her.

## 2019-10-20 ENCOUNTER — HOSPITAL ENCOUNTER (EMERGENCY)
Dept: HOSPITAL 47 - EC | Age: 79
Discharge: HOME | End: 2019-10-20
Payer: MEDICARE

## 2019-10-20 VITALS
SYSTOLIC BLOOD PRESSURE: 148 MMHG | RESPIRATION RATE: 20 BRPM | TEMPERATURE: 97.6 F | DIASTOLIC BLOOD PRESSURE: 91 MMHG | HEART RATE: 96 BPM

## 2019-10-20 DIAGNOSIS — R10.9: Primary | ICD-10-CM

## 2019-10-20 DIAGNOSIS — Z86.73: ICD-10-CM

## 2019-10-20 DIAGNOSIS — E78.5: ICD-10-CM

## 2019-10-20 DIAGNOSIS — Z79.899: ICD-10-CM

## 2019-10-20 DIAGNOSIS — I10: ICD-10-CM

## 2019-10-20 DIAGNOSIS — C34.90: ICD-10-CM

## 2019-10-20 DIAGNOSIS — G89.29: ICD-10-CM

## 2019-10-20 LAB
ALBUMIN SERPL-MCNC: 3.7 G/DL (ref 3.5–5)
ALP SERPL-CCNC: 407 U/L (ref 38–126)
ALT SERPL-CCNC: 28 U/L (ref 21–72)
AMYLASE SERPL-CCNC: 49 U/L (ref 30–110)
ANION GAP SERPL CALC-SCNC: 9 MMOL/L
AST SERPL-CCNC: 30 U/L (ref 17–59)
BASOPHILS # BLD AUTO: 0.1 K/UL (ref 0–0.2)
BASOPHILS NFR BLD AUTO: 1 %
BUN SERPL-SCNC: 23 MG/DL (ref 9–20)
CALCIUM SPEC-MCNC: 9.7 MG/DL (ref 8.4–10.2)
CHLORIDE SERPL-SCNC: 100 MMOL/L (ref 98–107)
CO2 SERPL-SCNC: 28 MMOL/L (ref 22–30)
EOSINOPHIL # BLD AUTO: 0.4 K/UL (ref 0–0.7)
EOSINOPHIL NFR BLD AUTO: 5 %
ERYTHROCYTE [DISTWIDTH] IN BLOOD BY AUTOMATED COUNT: 4.38 M/UL (ref 4.3–5.9)
ERYTHROCYTE [DISTWIDTH] IN BLOOD: 14.6 % (ref 11.5–15.5)
GLUCOSE SERPL-MCNC: 91 MG/DL (ref 74–99)
HCT VFR BLD AUTO: 39.6 % (ref 39–53)
HGB BLD-MCNC: 12.3 GM/DL (ref 13–17.5)
HYALINE CASTS UR QL AUTO: 4 /LPF (ref 0–2)
LYMPHOCYTES # SPEC AUTO: 0.6 K/UL (ref 1–4.8)
LYMPHOCYTES NFR SPEC AUTO: 7 %
MCH RBC QN AUTO: 28.2 PG (ref 25–35)
MCHC RBC AUTO-ENTMCNC: 31.2 G/DL (ref 31–37)
MCV RBC AUTO: 90.4 FL (ref 80–100)
MONOCYTES # BLD AUTO: 0.5 K/UL (ref 0–1)
MONOCYTES NFR BLD AUTO: 7 %
NEUTROPHILS # BLD AUTO: 6.5 K/UL (ref 1.3–7.7)
NEUTROPHILS NFR BLD AUTO: 78 %
PH UR: 6 [PH] (ref 5–8)
PLATELET # BLD AUTO: 227 K/UL (ref 150–450)
POTASSIUM SERPL-SCNC: 4.2 MMOL/L (ref 3.5–5.1)
PROT SERPL-MCNC: 7.2 G/DL (ref 6.3–8.2)
PROT UR QL: (no result)
RBC UR QL: 5 /HPF (ref 0–5)
SODIUM SERPL-SCNC: 137 MMOL/L (ref 137–145)
SP GR UR: 1.03 (ref 1–1.03)
SQUAMOUS UR QL AUTO: 1 /HPF (ref 0–4)
UROBILINOGEN UR QL STRIP: 3 MG/DL (ref ?–2)
WBC # BLD AUTO: 8.3 K/UL (ref 3.8–10.6)
WBC #/AREA URNS HPF: 4 /HPF (ref 0–5)

## 2019-10-20 PROCEDURE — 74018 RADEX ABDOMEN 1 VIEW: CPT

## 2019-10-20 PROCEDURE — 83690 ASSAY OF LIPASE: CPT

## 2019-10-20 PROCEDURE — 96374 THER/PROPH/DIAG INJ IV PUSH: CPT

## 2019-10-20 PROCEDURE — 99284 EMERGENCY DEPT VISIT MOD MDM: CPT

## 2019-10-20 PROCEDURE — 81001 URINALYSIS AUTO W/SCOPE: CPT

## 2019-10-20 PROCEDURE — 80053 COMPREHEN METABOLIC PANEL: CPT

## 2019-10-20 PROCEDURE — 82150 ASSAY OF AMYLASE: CPT

## 2019-10-20 PROCEDURE — 36415 COLL VENOUS BLD VENIPUNCTURE: CPT

## 2019-10-20 PROCEDURE — 71046 X-RAY EXAM CHEST 2 VIEWS: CPT

## 2019-10-20 PROCEDURE — 83605 ASSAY OF LACTIC ACID: CPT

## 2019-10-20 PROCEDURE — 85025 COMPLETE CBC W/AUTO DIFF WBC: CPT

## 2019-10-20 NOTE — XR
EXAM:

  XR Abdomen, 2 Views

 

CLINICAL HISTORY:

   abdominal pain

 

TECHNIQUE:

  Frontal upright views view of the abdomen/pelvis. 

 

COMPARISON:

CT of the abdomen/pelvis dated 9/11/2018.

 

FINDINGS:

  Intraperitoneal space:  No free air.

  Gastrointestinal tract:  Increased stool burden throughout the colon.  

No dilation.

  Organs:  Status post cholecystectomy.

  Bones/joints:  Unremarkable.

  Tubes, lines and devices:  Left-sided chest tube within a left 

hydropneumothorax, as described on the chest x-ray performed the same day.

 

IMPRESSION:     

  Increased stool burden throughout the colon.

## 2019-10-20 NOTE — XR
EXAM:

  XR Chest, 2 Views

 

CLINICAL HISTORY:

   abdominal pain

 

TECHNIQUE:

  Frontal and lateral views of the chest.

 

COMPARISON:

  10/15/2019.

 

FINDINGS:

  Lungs: Essentially unchanged.

  Pleural space:  See below.  

  Heart: Essentially unchanged.

  Mediastinum: Essentially unchanged

  Bones/joints:  Unremarkable.

  Tubes, lines and devices:  Left-sided chest tube within a left 

hydropneumothorax, likely unchanged from prior study allowing for 

difference in technique.

 

IMPRESSION:     

  Left-sided chest tube within a left hydropneumothorax, likely unchanged 

from prior study allowing for difference in technique.  CT of the chest 

may be obtained for further evaluation, if clinically indicated.
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
## 2019-10-25 ENCOUNTER — HOSPITAL ENCOUNTER (INPATIENT)
Dept: HOSPITAL 47 - EC | Age: 79
LOS: 4 days | Discharge: HOME HEALTH | DRG: 948 | End: 2019-10-29
Attending: FAMILY MEDICINE | Admitting: FAMILY MEDICINE
Payer: MEDICARE

## 2019-10-25 VITALS — BODY MASS INDEX: 29.2 KG/M2

## 2019-10-25 DIAGNOSIS — D50.9: ICD-10-CM

## 2019-10-25 DIAGNOSIS — H91.90: ICD-10-CM

## 2019-10-25 DIAGNOSIS — Z97.4: ICD-10-CM

## 2019-10-25 DIAGNOSIS — Z79.01: ICD-10-CM

## 2019-10-25 DIAGNOSIS — Z80.8: ICD-10-CM

## 2019-10-25 DIAGNOSIS — Z79.899: ICD-10-CM

## 2019-10-25 DIAGNOSIS — Z86.718: ICD-10-CM

## 2019-10-25 DIAGNOSIS — R06.02: ICD-10-CM

## 2019-10-25 DIAGNOSIS — G89.3: Primary | ICD-10-CM

## 2019-10-25 DIAGNOSIS — M89.9: ICD-10-CM

## 2019-10-25 DIAGNOSIS — I10: ICD-10-CM

## 2019-10-25 DIAGNOSIS — K52.9: ICD-10-CM

## 2019-10-25 DIAGNOSIS — C79.9: ICD-10-CM

## 2019-10-25 DIAGNOSIS — E78.5: ICD-10-CM

## 2019-10-25 DIAGNOSIS — Z82.49: ICD-10-CM

## 2019-10-25 DIAGNOSIS — J91.0: ICD-10-CM

## 2019-10-25 DIAGNOSIS — Z79.02: ICD-10-CM

## 2019-10-25 DIAGNOSIS — E86.0: ICD-10-CM

## 2019-10-25 DIAGNOSIS — Z87.891: ICD-10-CM

## 2019-10-25 DIAGNOSIS — Z90.49: ICD-10-CM

## 2019-10-25 DIAGNOSIS — H54.61: ICD-10-CM

## 2019-10-25 DIAGNOSIS — J94.8: ICD-10-CM

## 2019-10-25 DIAGNOSIS — R33.8: ICD-10-CM

## 2019-10-25 DIAGNOSIS — K59.03: ICD-10-CM

## 2019-10-25 DIAGNOSIS — C34.92: ICD-10-CM

## 2019-10-25 DIAGNOSIS — Z86.73: ICD-10-CM

## 2019-10-25 DIAGNOSIS — T40.605A: ICD-10-CM

## 2019-10-25 LAB
ALBUMIN SERPL-MCNC: 3.4 G/DL (ref 3.5–5)
ALP SERPL-CCNC: 367 U/L (ref 38–126)
ALT SERPL-CCNC: 21 U/L (ref 21–72)
ANION GAP SERPL CALC-SCNC: 10 MMOL/L
AST SERPL-CCNC: 25 U/L (ref 17–59)
BASOPHILS # BLD AUTO: 0.1 K/UL (ref 0–0.2)
BASOPHILS NFR BLD AUTO: 1 %
BUN SERPL-SCNC: 15 MG/DL (ref 9–20)
CALCIUM SPEC-MCNC: 9.4 MG/DL (ref 8.4–10.2)
CHLORIDE SERPL-SCNC: 103 MMOL/L (ref 98–107)
CO2 SERPL-SCNC: 22 MMOL/L (ref 22–30)
EOSINOPHIL # BLD AUTO: 0.2 K/UL (ref 0–0.7)
EOSINOPHIL NFR BLD AUTO: 2 %
ERYTHROCYTE [DISTWIDTH] IN BLOOD BY AUTOMATED COUNT: 4.16 M/UL (ref 4.3–5.9)
ERYTHROCYTE [DISTWIDTH] IN BLOOD: 14.2 % (ref 11.5–15.5)
GLUCOSE SERPL-MCNC: 125 MG/DL (ref 74–99)
HCT VFR BLD AUTO: 37.1 % (ref 39–53)
HGB BLD-MCNC: 12.4 GM/DL (ref 13–17.5)
LYMPHOCYTES # SPEC AUTO: 0.4 K/UL (ref 1–4.8)
LYMPHOCYTES NFR SPEC AUTO: 4 %
MCH RBC QN AUTO: 29.7 PG (ref 25–35)
MCHC RBC AUTO-ENTMCNC: 33.3 G/DL (ref 31–37)
MCV RBC AUTO: 89.1 FL (ref 80–100)
MONOCYTES # BLD AUTO: 0.4 K/UL (ref 0–1)
MONOCYTES NFR BLD AUTO: 5 %
NEUTROPHILS # BLD AUTO: 7.8 K/UL (ref 1.3–7.7)
NEUTROPHILS NFR BLD AUTO: 87 %
PLATELET # BLD AUTO: 231 K/UL (ref 150–450)
POTASSIUM SERPL-SCNC: 4.3 MMOL/L (ref 3.5–5.1)
PROT SERPL-MCNC: 6.9 G/DL (ref 6.3–8.2)
SODIUM SERPL-SCNC: 135 MMOL/L (ref 137–145)
WBC # BLD AUTO: 8.9 K/UL (ref 3.8–10.6)

## 2019-10-25 PROCEDURE — 99285 EMERGENCY DEPT VISIT HI MDM: CPT

## 2019-10-25 PROCEDURE — 83690 ASSAY OF LIPASE: CPT

## 2019-10-25 PROCEDURE — 80048 BASIC METABOLIC PNL TOTAL CA: CPT

## 2019-10-25 PROCEDURE — 85025 COMPLETE CBC W/AUTO DIFF WBC: CPT

## 2019-10-25 PROCEDURE — 80053 COMPREHEN METABOLIC PANEL: CPT

## 2019-10-25 PROCEDURE — 83605 ASSAY OF LACTIC ACID: CPT

## 2019-10-25 PROCEDURE — 96375 TX/PRO/DX INJ NEW DRUG ADDON: CPT

## 2019-10-25 PROCEDURE — 81001 URINALYSIS AUTO W/SCOPE: CPT

## 2019-10-25 PROCEDURE — 96374 THER/PROPH/DIAG INJ IV PUSH: CPT

## 2019-10-25 PROCEDURE — 74177 CT ABD & PELVIS W/CONTRAST: CPT

## 2019-10-25 PROCEDURE — 36415 COLL VENOUS BLD VENIPUNCTURE: CPT

## 2019-10-25 PROCEDURE — 93005 ELECTROCARDIOGRAM TRACING: CPT

## 2019-10-25 RX ADMIN — HYDROMORPHONE HYDROCHLORIDE PRN MG: 1 INJECTION, SOLUTION INTRAMUSCULAR; INTRAVENOUS; SUBCUTANEOUS at 20:31

## 2019-10-25 NOTE — DS
DISCHARGE SUMMARY



ADMITTING DATE:

10/15/2019



DISCHARGE DATE:

10/17/2019



CHIEF COMPLAINT:

Intractable chest pain and shortness of breath.



HISTORY OF PRESENT ILLNESS:

The details of this man's history and physical can be found in the initial workup.



LABORATORY STUDIES:

While he was in a hospital he had laboratory studies, details of which can be found in

the laboratory section of his chart.



COURSE IN HOSPITAL:

After admission, he was placed on bedrest, started on intravenous fluids and

analgesics.  He was seen and followed by Oncology.  The pain did improve and he started

to breathe slightly better.  It was felt he could be discharged home to home care and

he will be set up for outpatient therapy for his adenocarcinoma of the lung.



FINAL DIAGNOSES:

1. Intractable left-sided chest pain secondary to metastatic adenocarcinoma.

2. Shortness of breath secondary to metastatic adenocarcinoma.



OPERATIONS:

None.



CONSULTATIONS:

Oncology.



He is improved.





MMODL / IJN: [id number] / Job#: [id number]

## 2019-10-25 NOTE — HP
HISTORY AND PHYSICAL



CHIEF COMPLAINT:

Advancing adenocarcinoma of the lung with intractable chest pain and nausea and

vomiting.



HISTORY OF PRESENT ILLNESS:

This gentleman was just discharged and efforts are being made to control his pain.

Apparently he is set up soon for either chemo or metabolic therapy.  In the meantime,

he is not doing well.  He has had quite a bit of discomfort in the left chest.  He was

recently just started on Duragesic, but he is still having a lot of pain and today he

started vomiting.  He has had no hematemesis.  He has a PleurX tube in the left chest.



REVIEW OF SYSTEMS:

He has had no headaches, confusion, hematemesis, jaundice, renal symptoms, etc.



Past medical history, family history, personal and social histories are essentially

unchanged or otherwise unremarkable.



PHYSICAL EXAMINATION:

Blood pressure is 123/64 with a pulse of 69, respirations of 35.  He is afebrile.  In

general, he appeared to be chronically ill.  He was pale.  He is slightly dehydrated.

Head, ears, eyes, nose, throat were unremarkable except for the damaged right eye where

he is blind.  Neck veins not distended.

CHEST:  Clear except for scattered rales.  Cardiac exam demonstrated slight sinus

tachycardia and PleurX tube was in the left chest.  The abdomen is slightly

protuberant.  There are no definite masses or tenderness.  Bowel sounds are heard.

Extremities are normal.  Neurologically he is intact.



IMPRESSION:

1. 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

2. Intractable pain.

3. Shortness of breath.

4. Dehydration.



PLAN:

1. Bed rest.

2. IV fluids.

3. Reconsult with Oncology.

4. Readjust his analgesic program.





MMODL / IJN: [id number] / Job#: [id number]
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
## 2019-10-25 NOTE — CT
EXAMINATION TYPE: CT abdomen pelvis w con

 

DATE OF EXAM: 10/25/2019

 

COMPARISON: CT chest 10/16/2019

 

HISTORY: Pain around port left lateral abdomen. Constipation.

 

CT DLP: 1510.9 mGycm

 

CONTRAST: 

CT scan of the abdomen and pelvis is performed without Oral Contrast and with IV Contrast, patient in
jected with 100 mL of Isovue 300.

 

FINDINGS: 

LUNG BASES-: No visible nodule.  No infiltrate. Large partially imaged left-sided hydropneumothorax w
ith catheter in place. Right lung is clear.

 

LIVER/GB:   Cholecystectomy clips are placed.  No space occupying hepatic lesion. Biliary tree is of 
normal caliber. 

 

PANCREAS:  No inflammation.  No distinct mass. 

 

SPLEEN:  No splenic enlargement.  No lesion seen. 

 

ADRENALS:  No nodule.  No thickening. 

 

KIDNEYS/BLADDER:  No hydronephrosis.  No nephrolithiasis. Renal cystic changes noted. Urinary bladder
 grossly unremarkable. 

 

BOWEL: Normal appendix. Surgical anastomosis sigmoid colon. Fluid distention of small and large bowel
 is nonspecific and may reflect enterocolitis. No evidence for abscess or free air.

 

GENITAL ORGANS:  No gross abnormality. 

 

LYMPH NODES:  No greater than 1cm abdominal or pelvic lymph nodes are appreciated.

 

AORTA: No significant abnormality. 

 

OSSEOUS STRUCTURES:  No significant abnormality is seen. 

 

OTHER: Mild subcutaneous edema at the site of a left-sided port.

 

IMPRESSION: 

1. Large partially imaged left-sided hydropneumothorax with catheter in place. 

2.Mild subcutaneous edema at the site of a left-sided port.

3.Fluid distention of small and large bowel is nonspecific and may reflect enterocolitis.

## 2019-10-26 LAB
ANION GAP SERPL CALC-SCNC: 10 MMOL/L
BASOPHILS # BLD AUTO: 0 K/UL (ref 0–0.2)
BASOPHILS NFR BLD AUTO: 1 %
BUN SERPL-SCNC: 15 MG/DL (ref 9–20)
CALCIUM SPEC-MCNC: 9.4 MG/DL (ref 8.4–10.2)
CHLORIDE SERPL-SCNC: 101 MMOL/L (ref 98–107)
CO2 SERPL-SCNC: 26 MMOL/L (ref 22–30)
EOSINOPHIL # BLD AUTO: 0.4 K/UL (ref 0–0.7)
EOSINOPHIL NFR BLD AUTO: 4 %
ERYTHROCYTE [DISTWIDTH] IN BLOOD BY AUTOMATED COUNT: 4.23 M/UL (ref 4.3–5.9)
ERYTHROCYTE [DISTWIDTH] IN BLOOD: 14.1 % (ref 11.5–15.5)
GLUCOSE SERPL-MCNC: 136 MG/DL (ref 74–99)
HCT VFR BLD AUTO: 38.8 % (ref 39–53)
HGB BLD-MCNC: 12.5 GM/DL (ref 13–17.5)
LYMPHOCYTES # SPEC AUTO: 0.6 K/UL (ref 1–4.8)
LYMPHOCYTES NFR SPEC AUTO: 6 %
MCH RBC QN AUTO: 29.5 PG (ref 25–35)
MCHC RBC AUTO-ENTMCNC: 32.1 G/DL (ref 31–37)
MCV RBC AUTO: 91.7 FL (ref 80–100)
MONOCYTES # BLD AUTO: 0.4 K/UL (ref 0–1)
MONOCYTES NFR BLD AUTO: 4 %
NEUTROPHILS # BLD AUTO: 7.8 K/UL (ref 1.3–7.7)
NEUTROPHILS NFR BLD AUTO: 83 %
PH UR: 5.5 [PH] (ref 5–8)
PLATELET # BLD AUTO: 259 K/UL (ref 150–450)
POTASSIUM SERPL-SCNC: 3.9 MMOL/L (ref 3.5–5.1)
PROT UR QL: (no result)
SODIUM SERPL-SCNC: 137 MMOL/L (ref 137–145)
SP GR UR: >1.05 (ref 1–1.03)
SQUAMOUS UR QL AUTO: 2 /HPF (ref 0–4)
UROBILINOGEN UR QL STRIP: <2 MG/DL (ref ?–2)
WBC # BLD AUTO: 9.4 K/UL (ref 3.8–10.6)
WBC #/AREA URNS HPF: 6 /HPF (ref 0–5)

## 2019-10-26 RX ADMIN — HEPARIN SODIUM SCH UNIT: 5000 INJECTION, SOLUTION INTRAVENOUS; SUBCUTANEOUS at 21:06

## 2019-10-26 RX ADMIN — DULOXETINE SCH MG: 60 CAPSULE, DELAYED RELEASE ORAL at 10:18

## 2019-10-26 RX ADMIN — HYDROMORPHONE HYDROCHLORIDE PRN MG: 1 INJECTION, SOLUTION INTRAMUSCULAR; INTRAVENOUS; SUBCUTANEOUS at 12:57

## 2019-10-26 RX ADMIN — HYDROMORPHONE HYDROCHLORIDE PRN MG: 1 INJECTION, SOLUTION INTRAMUSCULAR; INTRAVENOUS; SUBCUTANEOUS at 16:59

## 2019-10-26 RX ADMIN — CLOPIDOGREL BISULFATE SCH MG: 75 TABLET ORAL at 10:18

## 2019-10-26 RX ADMIN — DOCUSATE SODIUM SCH MG: 100 CAPSULE, LIQUID FILLED ORAL at 10:18

## 2019-10-26 RX ADMIN — FAMOTIDINE SCH MG: 20 TABLET, FILM COATED ORAL at 21:04

## 2019-10-26 RX ADMIN — HYDROMORPHONE HYDROCHLORIDE PRN MG: 1 INJECTION, SOLUTION INTRAMUSCULAR; INTRAVENOUS; SUBCUTANEOUS at 02:05

## 2019-10-26 RX ADMIN — HEPARIN SODIUM SCH UNIT: 5000 INJECTION, SOLUTION INTRAVENOUS; SUBCUTANEOUS at 16:02

## 2019-10-26 RX ADMIN — HYDROMORPHONE HYDROCHLORIDE PRN MG: 1 INJECTION, SOLUTION INTRAMUSCULAR; INTRAVENOUS; SUBCUTANEOUS at 08:30
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
## 2019-10-27 RX ADMIN — HYDROMORPHONE HYDROCHLORIDE PRN MG: 1 INJECTION, SOLUTION INTRAMUSCULAR; INTRAVENOUS; SUBCUTANEOUS at 14:27

## 2019-10-27 RX ADMIN — FAMOTIDINE SCH MG: 20 TABLET, FILM COATED ORAL at 08:15

## 2019-10-27 RX ADMIN — HEPARIN SODIUM SCH: 5000 INJECTION, SOLUTION INTRAVENOUS; SUBCUTANEOUS at 08:15

## 2019-10-27 RX ADMIN — HYDROMORPHONE HYDROCHLORIDE PRN MG: 1 INJECTION, SOLUTION INTRAMUSCULAR; INTRAVENOUS; SUBCUTANEOUS at 08:15

## 2019-10-27 RX ADMIN — RIVAROXABAN SCH MG: 20 TABLET, FILM COATED ORAL at 16:50

## 2019-10-27 RX ADMIN — DOCUSATE SODIUM SCH MG: 100 CAPSULE, LIQUID FILLED ORAL at 08:15

## 2019-10-27 RX ADMIN — HYDROMORPHONE HYDROCHLORIDE PRN MG: 1 INJECTION, SOLUTION INTRAMUSCULAR; INTRAVENOUS; SUBCUTANEOUS at 05:04

## 2019-10-27 RX ADMIN — DULOXETINE SCH MG: 60 CAPSULE, DELAYED RELEASE ORAL at 08:14

## 2019-10-27 RX ADMIN — CLOPIDOGREL BISULFATE SCH MG: 75 TABLET ORAL at 08:14

## 2019-10-27 RX ADMIN — FAMOTIDINE SCH MG: 20 TABLET, FILM COATED ORAL at 20:39

## 2019-10-27 NOTE — CONS
CONSULTATION



REASON FOR CONSULTATION:

Lung cancer.



HISTORY OF THE CHIEF COMPLAINT:

Chest pain.



Mr. More is a very pleasant 79-year-old  gentleman, very well known to me,

who initially presented with left upper quadrant pain and dyspnea.  He had a CT urogram

in August of 2019 looking for a kidney stone.  However, he was found to have large

pleural effusion.  He had a CT scan of the chest on 08/21/2019, which revealed

completely collapsed left lung with near complete totally filling of the left pleural

space.  He was referred to Dr. Delong and he had a bronchoscopy and bronchoalveolar

lavage, which was negative.  Then on 09/13/2019, he had VAT and PleurX catheter

placement, pleural  fluid and pleural biopsies positive for moderately differentiated

carcinoma with gastrointestinal differentiation.  He was referred to have the EGD and

colonoscopy on 10/07/2019, which did not reveal any suspicious findings.  Then

subsequently on 10/24/2019, he had a PET scan which revealed highly suspicious left

hilar mass measuring 5.1 x 3.9 cm and also suspicious bilateral hilar node and osseous

lesion and there was no upper GI or pancreatic or biliary lesion found on PET scan.  I

did further review his pathology finding with the pathologist and in view of clinical

and radiographic graphic picture, it was felt that the pleural biopsy may represent

lung carcinoma with _____ differentiation and addendum to the path report was issued.

His PDL1 depth was 5-10 percent positive, and [employer] sequencing revealed the presence

of TP53 mutation.  Also in October of 2019, he was diagnosed with deep venous

thrombosis and he was placed on anticoagulation.  He presented to the hospital with

worsening pain in the left chest wall at the site of PleurX catheter.  The PleurX

catheter which has been draining about 200-400 mL daily.  The pain was sharp and severe

with about 10/10 in severity and also he was complaining of being constipated.  He did

undergo a CT scan of the abdomen and pelvis while in the hospital, which revealed

evidence of small bowel fluid distention of small bowel which was nonspecific and also

in addition to the presence of left-sided hydropneumothorax related to the catheter

placement.  The patient was admitted to the hospital.  He was started on stool softener

and laxative and his pain medication were adjusted.  He continued with Dilaudid and

also his fentanyl patch was increased.  Today, he feels more comfortable.  His pain is

much better and he still has not had a good bowel movement over the last couple of

days.  He feels overall tired.  No nausea or vomiting.  No fever or chills.  No

headaches.  No melena, hematochezia, hematuria, hemoptysis, hematemesis or epistaxis.

His weight has been relatively stable since last time I had seen him.



PAST MEDICAL HISTORY:

In addition to what is stated above in regard to recent diagnosis of metastatic

carcinoma, he has a history of hypertension and hyperlipidemia.



PAST SURGICAL HISTORY:

He had an EGD, colonoscopy,  cholecystectomy, tonsillectomy, PleurX catheter placement,

MediPort placement, partial small bowel resection in the past.



ALLERGIES:

There is no known drug allergy.



FAMILY HISTORY:

His son has head and neck cancer.



SOCIAL HISTORY:

He used to smoke, quit in 1979.  No alcohol abuse or substance abuse.



REVIEW OF SYSTEMS:

As stated above in history of present illness.



CURRENT MEDICATION:

In the hospital includes: Plavix 75 mg daily, Colace 100 mg daily, Dulcolax 60 mg

daily, Pepcid 20 mg daily.  Fentanyl 50 mcg/hour patch.  He is on heparin subcu 5000

units subcu every 8 hours, Dilaudid 1-2 mg every 3 hours as needed.  MiraLAX 17 g p.o.

daily and tramadol 50 mg q.i.d. for pain.



PHYSICAL EXAMINATION:

He is alert, oriented x3.  He does not appear to be in distress at this time.  His

vital signs are temperature 97.7, afebrile, pulse 101, respirations 16, blood pressure

119/79.  HEENT:  Normocephalic, atraumatic.  No obvious icterus.

NECK:  Supple.  No jugular venous distention.  Chest equal chest expansion bilaterally.

LUNGS:  Decreased breath sounds on the left base about half way up.

ABDOMEN:  Soft.  There is no distention.  Bowel sounds present.  Extremities reveal no

edema.  Lymphatics:  No peripherally enlarged cervical supraclavicular nodes.

Musculoskeletal:  Moving all extremities appropriately.  No percussion tenderness

detected over spine or sternum.



RECENT LABORATORY DATA:

Shows a WBC 9.4, hemoglobin 12.5, hematocrit 38.8, platelet count 259.  Sodium 137,

potassium 3.9, chloride 101. CO2 is 27, BUN 15, creatinine 0.86.



IMPRESSION:

1. Left sided chest wall pain related to his underlying malignancy and PleurX

    catheter, appears to be under better control now.

2. Narcotic induced constipation.

3. Recent diagnosis of deep venous thrombosis.

4. Metastatic lung carcinoma with diagnostic and therapeutic circumstances stated

    above.



RECOMMENDATION:

1. Continue current pain medication regimen.

2. Continue laxative and stool softener.

3. In regard to a deep venous thrombosis, I would recommend that he will go back on

    _____which he was on it in the outpatient setting and discontinue subcu heparin.

4. If the patient has remained stable and his pain under control,  he can be

    discharged home.  He was supposed to start his 1st cycle of systemic treatment for

    his malignancy this coming week.

The above was discussed with the patient and his wife at the bedside and I have

answered all their questions.





Thank you very much for the consultation.





MMODL / IJN: [id number] / Job#: [id number]

## 2019-10-27 NOTE — P.PN
Subjective





79-year-old male with a history of metastatic lung cancer is admitted for pain 

management patient is a Pleurx catheter on the left patient drains about 400 200

mL once every 3 days.  Patient came in with the sharp uncontrolled pain pain is 

better now with Dilaudid but we are transitioning him to either transdermal 

patch her by mouth medication.  I'll also start him on nonsteroidal anti-

inflammatory medication for breakthrough pain and the patient was started on 

fentanyl patch for baseline pain control.  Patient is constipated because of 

opiates patient will be started on bowel regimen for that.patient is complaining

of sharp severe 10/10 pain in the left chest area where he has pleural effusion 

with Pleurx catheter





C





10/27/2019


Patient had a small bowel movement does have good bowel sounds.  Patient pain is

still not well-controlled well increase the dose of fentanyl to 75 g will 

continue with her tramadol as needed along with Dilaudid as needed hopefully 

patient will will have better pain control tomorrow.  Probably can be discharged

tomorrow.





Constitutional: Denied any fatigue denied any fever.


Cardio vascular: denied any chest pain, palpitations


Gastrointestinal denied any nausea vomiting


Pulmonary: Denied any shortness of breath cough


Neurologic denied any new focal deficits





All inpatient medications were reviewed and appropriate changes in these 

medications as dictated in the interval history and assessment and plan.





Objective





- Vital Signs


Vital signs: 


                                   Vital Signs











Temp  97.7 F   10/27/19 11:29


 


Pulse  101 H  10/27/19 11:29


 


Resp  15   10/27/19 11:29


 


BP  119/79   10/27/19 11:29


 


Pulse Ox  96   10/27/19 11:29








                                 Intake & Output











 10/26/19 10/27/19 10/27/19





 18:59 06:59 18:59


 


Intake Total 400 1240 780


 


Output Total 415 250 


 


Balance -15 990 780


 


Weight 92.533 kg  


 


Intake:   


 


  Oral 400 1240 780


 


Output:   


 


  Urine 175 250 


 


  Post Void Residual 240  


 


Other:   


 


  Voiding Method Urinal Urinal Urinal


 


  # Voids 1 1 2


 


  # Bowel Movements   1














- Exam





PHYSICAL EXAMINATION: 





GENERAL: The patient is alert and oriented x3, not in any acute distress. Well 

developed, well nourished. 


HEENT: Pupils are round and equally reacting to light. EOMI. No scleral icterus.

No conjunctival pallor. Normocephalic, atraumatic. No pharyngeal erythema. No 

thyromegaly. 


CARDIOVASCULAR: S1 and S2 present. No murmurs, rubs, or gallops. 


PULMONARY: Chest is clear to auscultation, no wheezing or crackles. left-sided 

Pleurx catheter in place


ABDOMEN: Soft, nontender, nondistended, normoactive bowel sounds. No palpable 

organomegaly. 


MUSCULOSKELETAL: No joint swelling or deformity.


EXTREMITIES: No cyanosis, clubbing, or pedal edema. 


NEUROLOGICAL: Gross neurological examination did not reveal any focal deficits. 


SKIN: No rashes. 








- Labs


CBC & Chem 7: 


                                 10/26/19 08:22





                                 10/26/19 08:22





Assessment and Plan


Plan: 


-left-sided chest pain secondary to malignant pleural effusion along with Pleurx

catheter and lung cancer metastasis.  Patient's pain management as mentioned 

above along with bowel regimen


-Constipation which is looking like enterocolitis as per CAT scan bleeding and 

patient appears to have constipation patient does have bowel distention tympanic

abdomen with sluggish bowel sounds will use bowel regimen with lactulose and 

MiraLAX on as-needed basis and constipation secondary to opiates


-Urinary retention secondary to opiates


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


-CVAT in the past


-Hypertension


-Hyperlipidemia


-DVT prophylaxis with subcutaneous heparin

## 2019-10-28 RX ADMIN — RIVAROXABAN SCH MG: 20 TABLET, FILM COATED ORAL at 16:38

## 2019-10-28 RX ADMIN — FAMOTIDINE SCH MG: 20 TABLET, FILM COATED ORAL at 20:49

## 2019-10-28 RX ADMIN — FAMOTIDINE SCH MG: 20 TABLET, FILM COATED ORAL at 08:30

## 2019-10-28 RX ADMIN — DOCUSATE SODIUM SCH MG: 100 CAPSULE, LIQUID FILLED ORAL at 08:30

## 2019-10-28 RX ADMIN — DULOXETINE SCH MG: 60 CAPSULE, DELAYED RELEASE ORAL at 08:30

## 2019-10-28 NOTE — PN
PROGRESS NOTE



CHIEF COMPLAINT:

Intractable pain secondary to malignancy.



HISTORY OF PRESENT ILLNESS:

This gentleman is doing a little bit better.  Pain management seems to be under better

control.  He has had no nausea, vomiting or lethargy.



PHYSICAL EXAMINATION:

Breath sounds are heard well on the right.  They are less prominent on the left.

Cardiac exam is normal.  Abdomen is soft, nontender.



IMPRESSION:

Intractable cancer pain secondary to adenocarcinoma of the left lung.



PLAN:

Continue to adjust medication program until he is comfortable enough to try going home

again.  We are waiting to hear whether or not he will be candidate for immuno and/or

chemotherapy.





MMODL / IJN: [id number] / Job#: [id number]

## 2019-10-29 VITALS — RESPIRATION RATE: 18 BRPM | SYSTOLIC BLOOD PRESSURE: 135 MMHG | DIASTOLIC BLOOD PRESSURE: 88 MMHG | TEMPERATURE: 97.7 F

## 2019-10-29 VITALS — HEART RATE: 116 BPM

## 2019-10-29 RX ADMIN — DOCUSATE SODIUM SCH MG: 100 CAPSULE, LIQUID FILLED ORAL at 08:00

## 2019-10-29 RX ADMIN — FAMOTIDINE SCH MG: 20 TABLET, FILM COATED ORAL at 08:00

## 2019-10-29 RX ADMIN — DULOXETINE SCH MG: 60 CAPSULE, DELAYED RELEASE ORAL at 07:59

## 2019-10-30 NOTE — DS
DISCHARGE SUMMARY



CHIEF COMPLAINT:

Intractable left chest pain from adenocarcinoma.



HISTORY OF PRESENT ILLNESS AND PHYSICAL EXAMINATION:

Details of this man's history and physical can be found in the initial workup.



LABORATORY STUDIES:

While he was in the hospital he had laboratory studies, details of which can be found

in the laboratory section of his chart.



COURSE IN THE HOSPITAL:

After admission he was placed on bedrest and started on intravenous fluids and

analgesic program was adjusted.  He became much more comfortable.  Oncology had nothing

further to offer him at this time and has planned on his chemo and immunotherapy to

start later this week.  He was doing well, and it was felt that he could go home. He

will be set up with home nursing.  We will see him in the office in several days to see

how the pain management is working.



FINAL DIAGNOSES:

1. Adenocarcinoma of the left lung.

2. Intractable chest pain.

3. Chronic left pleural effusion.



OPERATIONS:

None.



CONSULTATION:

Oncology.



He is improved.





MMODL / IJN: [id number] / Job#: [id number]
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
## 2023-02-17 NOTE — CDI
Documentation Clarification Form



Date: 10/23/2019 12:06:00 PM

From: Bisi Thomas

Phone: If you have a question about this query, please contact Brittaney Nails 
 at [phone] between 8am and 5pm.

MRN: [id number]

Admit Date: 10/15/2019 3:33:00 PM

Patient Name: Nilda More

Visit Number: [id number]

Discharge Date:  10/17/2019 5:38:00 PM





ATTENTION: The Clinical Documentation Specialists (CDI) and [facility] Coding Staff 
appreciate your assistance in clarifying documentation. Please respond to the 
clarification below the line at the bottom and electronically sign. The CDI & 
[facility] Coding staff will review the response and follow-up if needed. Please note: 
Queries are made part of the Legal Health Record. If you have any questions, 
please contact the author of this message via ITS.



Dr. Wilfred Rodriguez



Conflicting documentation has been found in the medical record:

Per cardiology consult: states elevated troponins, Type II MI secondary to PE.

Per your 10/17 PN: acute myocardial infarction



History/Risk Factors: malignant pleural effusions, met bone ca

Clinical Indicators: He developed substernal discomfort which he describes as a 
pressure squeezing.

Troponin I- 0.053, 0.046, 0.044

Treatment: serial EKGs and enzymes, consult cardiology



In your opinion, what is the most clinically appropriate diagnosis for this 
patient?  

Type II MI

MI specify type____________________

Other explanation of clinical findings

Unable to determine (no explanation for clinical findings)

___________________________________________________________________________



MTDD
Documentation Clarification Form



Date: 10/25/19

From: Bisi Thomas

Phone: If you have a question about this query, please contact Brittaney Nails, 
 at [phone] between 8am and 5pm.

Admit Date: 10/15/19

Discharge Date: 10/1719

Patient Name: Nilda More

Visit Number: [id number]



ATTENTION: The Clinical Documentation Specialists (CDI) and [facility] Coding Staff 
appreciate your assistance in clarifying documentation. Please respond to the 
clarification below the line at the bottom and electronically sign. The CDI & 
[facility] Coding staff will review the response and follow-up if needed. Please note: 
Queries are made part of the Legal Health Record. If you have any questions, 
please contact the author of this message via ITS.



Dear Dr JORGE LUIS Rodriguez,



The diagnosis pulmonary embolism was documented in Dr Walker's, Dr. Stokes's
& Dr Tong's consults and 10/17 PNs, but is not noted in subsequent documentation.

Cardiology consult sates elevated troponins, Type II MI secondary to PE.

History/Risk Factors:  Left lung ca, mets to bone, malignant pleural effusion

Clinical Indicators:  He presented with chest pain. 

Treatment: Lovenox and Xarelto



Please clarify if the you agree with the diagnosis of pulmonary embolism

   

   Present/active/treated this admission

   Pulmonary embolism ruled out

   Other, please specify _______________

   Clinically unable to determine

___________________________________________________________________________

MTDD
2 = difficulty speaking (not related to language barrier)